# Patient Record
Sex: FEMALE | Race: WHITE | NOT HISPANIC OR LATINO | Employment: FULL TIME | URBAN - METROPOLITAN AREA
[De-identification: names, ages, dates, MRNs, and addresses within clinical notes are randomized per-mention and may not be internally consistent; named-entity substitution may affect disease eponyms.]

---

## 2017-01-10 ENCOUNTER — TELEPHONE (OUTPATIENT)
Dept: UROLOGY | Facility: CLINIC | Age: 62
End: 2017-01-10

## 2017-01-10 ENCOUNTER — OFFICE VISIT (OUTPATIENT)
Dept: UROLOGY | Facility: CLINIC | Age: 62
End: 2017-01-10

## 2017-01-10 VITALS
DIASTOLIC BLOOD PRESSURE: 71 MMHG | WEIGHT: 151.3 LBS | BODY MASS INDEX: 25.83 KG/M2 | SYSTOLIC BLOOD PRESSURE: 104 MMHG | HEIGHT: 64 IN | HEART RATE: 83 BPM

## 2017-01-10 DIAGNOSIS — N89.8 VAGINAL DRYNESS: Primary | ICD-10-CM

## 2017-01-10 DIAGNOSIS — R39.15 URINARY URGENCY: ICD-10-CM

## 2017-01-10 DIAGNOSIS — N39.0 RECURRENT UTI: ICD-10-CM

## 2017-01-10 DIAGNOSIS — N39.41 URGE INCONTINENCE OF URINE: ICD-10-CM

## 2017-01-10 LAB
ALBUMIN UR-MCNC: NEGATIVE MG/DL
APPEARANCE UR: ABNORMAL
BACTERIA #/AREA URNS HPF: ABNORMAL /HPF
BILIRUB UR QL STRIP: NEGATIVE
COLOR UR AUTO: YELLOW
GLUCOSE UR STRIP-MCNC: NEGATIVE MG/DL
HGB UR QL STRIP: ABNORMAL
KETONES UR STRIP-MCNC: NEGATIVE MG/DL
LEUKOCYTE ESTERASE UR QL STRIP: ABNORMAL
MUCOUS THREADS #/AREA URNS LPF: PRESENT /LPF
NITRATE UR QL: NEGATIVE
PH UR STRIP: 6 PH (ref 5–7)
RBC #/AREA URNS AUTO: 12 /HPF (ref 0–2)
SP GR UR STRIP: 1.01 (ref 1–1.03)
SQUAMOUS #/AREA URNS AUTO: 2 /HPF (ref 0–1)
TRANS CELLS #/AREA URNS HPF: 3 /HPF
URN SPEC COLLECT METH UR: ABNORMAL
UROBILINOGEN UR STRIP-MCNC: 0 MG/DL (ref 0–2)
WBC #/AREA URNS AUTO: >182 /HPF (ref 0–2)

## 2017-01-10 RX ORDER — OXYBUTYNIN CHLORIDE 5 MG/1
5 TABLET ORAL 3 TIMES DAILY
Qty: 100 TABLET | Refills: 3 | Status: SHIPPED | OUTPATIENT
Start: 2017-01-10 | End: 2017-02-09

## 2017-01-10 ASSESSMENT — PAIN SCALES - GENERAL: PAINLEVEL: NO PAIN (0)

## 2017-01-10 NOTE — TELEPHONE ENCOUNTER
Patient called regarding UTI symptoms  Frequency urgent dysuria   Ordered uauc and call with results. Kassy Monson LPN Staff Nurse

## 2017-01-10 NOTE — Clinical Note
"1/10/2017       RE: Slim Mcbride  [address] [facility] [address]     Dear Colleague,    Thank you for referring your patient, Slim Mcbride, to the [facility] UROLOGY AND INST FOR PROSTATE AND UROLOGIC CANCERS at [facility]. Please see a copy of my visit note below.    Follow up visit:  1/10/2017  Last seen by me:   9/20/2016    CC: urinary frequency/urgency, recurrent UTI    HPI:  61 year old female with ongoing UTIs-continues to have urgency/frequency with infection. When she does not have infection-urgency much improved q 3-4 hours.  She has completed physical therapy-thought helpful for both urgency/frequency. When she feels has UTI has intermittent urge incontinence. She uses 3-4 PPD. She continues to take 18 mg of oxybutynin/day.  She has noted more vaginal dryness lately and now has dyspareunia-she feels related to dryness.     She notes vaginal bulge.    ROS: no other changes in health since last visit.      PEx:  /71 mmHg  Pulse 83  Ht 1.626 m (5' 4\")  Wt 68.629 kg (151 lb 4.8 oz)  BMI 25.96 kg/m2     Gen appearance:  Well groomed  HEENT:  EOMI, AT NC  Psych:  alert , comfortable in no acute distress  Neuro:  A/O X 3  Skin:  Warm to touch  Resp:  No increased respiratory effort  lymph:  No LE edema  Musk:  Full ROM in extremities  :  :  Normal external genitalia and introitus, mild atrophic changes/vaginal irriation          Urethra hypermobile          Stress incontinence was not demonstrated with bearing down with and without pessary in place.           Grade 2  rectocele            ASSESSMENT and PLAN:  This is a 60 year old female with urinary urgency/frequency with urge incontinence-improved with physical therapy but worse again since she feels she has UTI. She discontinued pessary use as not needed with improvement with PT, tried again recently but painful due to vaginal irritation.    Plan:   Will order:  Urinalysis  Urine " Culture    Continue with oxybutynin 18 mg per day.  Prescribed estrace cream: use pea size amount-place in vagina nightly x 2 weeks then use 2x/ week ongoing-for vaginal dryness.    Follow up with Dr. Dimas in 3-4 weeks to consider prophylactic antibiotic and use of larger pessary #3 (did not give today due to vaginal irritation)      Thank you for allowing me to participate in Ms. Mcbride's care.      30 minutes were spent with the patient today, > 50% in counseling and coordination of care.    Kayla Amador PA-C, PT

## 2017-01-10 NOTE — MR AVS SNAPSHOT
After Visit Summary   1/10/2017    Slim Mcbride    MRN: [id number]           Patient Information     Date Of Birth          1955        Visit Information        Provider Department      1/10/2017 1:30 PM Kayla Amador PA-C [facility]rology and [facility]        Today's Diagnoses     Vaginal dryness    -  1     Recurrent UTI         Urinary urgency         Urge incontinence of urine           Care Instructions    Will order:  Urinalysis  Urine Culture    Continue with oxybutynin 18 mg per day.  Prescribed estrace cream: use pea size amount-place in vagina nightly x 2 weeks then use 2x/ week ongoing-for vaginal dryness.    Follow up with Dr. Dimas in 3-4 weeks to consider prophylactic antibiotic and use of larger pessary #3 (did not give today due to vaginal irritation)    Thank you  Kayla Amador PA-C, PT          Follow-ups after your visit        Your next 10 appointments already scheduled     Feb 23, 2017  8:30 AM   (Arrive by 8:15 AM)   Return Visit with Milagro Dimas MD   [facility] ([facility])    [address] [place] [address] [phone]              Who to contact     Please call your clinic at [phone] to:    Ask questions about your health    Make or cancel appointments    Discuss your medicines    Learn about your test results    Speak to your doctor   If you have compliments or concerns about an experience at your clinic, or if you wish to file a complaint, please contact [facility] Patient Relations at [phone] or email us at [email or web address]         Additional Information About Your Visit        MyChart Information     [email or web address] gives you secure access to your electronic health record. If you see a primary care provider, you can also send messages to your care team and make appointments.  "If you have questions, please call your primary care clinic.  If you do not have a primary care provider, please call [phone] and they will assist you.      [employer] is an electronic gateway that provides easy, online access to your medical records. With [employer], you can request a clinic appointment, read your test results, renew a prescription or communicate with your care team.     To access your existing account, please contact your [facility] or call [phone] for assistance.        Care EveryWhere ID     This is your Care EveryWhere ID. This could be used by other organizations to access your [place] medical records  [id number]        Your Vitals Were     Pulse Height BMI (Body Mass Index)             83 1.626 m (5' 4\") 25.96 kg/m2          Blood Pressure from Last 3 Encounters:   01/10/17 104/71   09/20/16 101/64   05/31/16 103/64    Weight from Last 3 Encounters:   01/10/17 68.629 kg (151 lb 4.8 oz)   09/20/16 69.854 kg (154 lb)   05/31/16 70.897 kg (156 lb 4.8 oz)              We Performed the Following     Routine UA with microscopic - No culture     Urine Culture Aerobic Bacterial          Today's Medication Changes          These changes are accurate as of: 1/10/17  2:36 PM.  If you have any questions, ask your nurse or doctor.               Start taking these medicines.        Dose/Directions    conjugated estrogens cream   Commonly known as:  PREMARIN   Used for:  Vaginal dryness   Started by:  Kayla Amador PA-C        Dose:  0.5 g   Start taking on:  1/12/2017   Place 0.5 g vaginally twice a week Begin with pea-size amount placed in vagina, nightly x 2 weeks. Then use 2x/week   Quantity:  30 g   Refills:  1            Where to get your medicines      These medications were sent to [facility] - [place] - [address]  [address] [facility] [address]     Phone:  [phone]    - conjugated estrogens cream  - oxybutynin " 5 MG tablet             Primary Care Provider Office Phone # Fax #    Joe Cardenas [phone]       [address] [address]        Thank you!     Thank you for choosing [facility] UROLOGY AND [facility] FOR PROSTATE AND UROLOGIC CANCERS  for your care. Our goal is always to provide you with excellent care. Hearing back from our patients is one way we can continue to improve our services. Please take a few minutes to complete the written survey that you may receive in the mail after your visit with us. Thank you!             Your Updated Medication List - Protect others around you: Learn how to safely use, store and throw away your medicines at www.disposemymeds.org.          This list is accurate as of: 1/10/17  2:36 PM.  Always use your most recent med list.                   Brand Name Dispense Instructions for use    ALLEGRA 180 MG tablet   Generic drug:  fexofenadine      take 180 mg by mouth daily.       aspirin 81 MG tablet      take 1 Tab by mouth.       CENTRUM SILVER Chew      take  by mouth. 2 daily       ciprofloxacin 500 MG tablet    CIPRO    14 tablet    Take 1 tablet (500 mg) by mouth 2 times daily       CITRACAL + D 315-200 MG-UNIT Tabs per tablet   Generic drug:  calcium citrate-vitamin D      take  by mouth. 1 in pm, 2 in am       conjugated estrogens cream   Start taking on:  1/12/2017    PREMARIN    30 g    Place 0.5 g vaginally twice a week Begin with pea-size amount placed in vagina, nightly x 2 weeks. Then use 2x/week       cyanocobalamin 1000 MCG tablet    vitamin  B-12     take 1,000 mcg by mouth daily.       nitrofurantoin (macrocrystal-monohydrate) 100 MG capsule    MACROBID    14 capsule    Take 1 capsule (100 mg) by mouth 2 times daily       OMEPRAZOLE MAGNESIUM PO      Take 20.6 mg by mouth       * OXYBUTYNIN CHLORIDE PO      Take 18 mg by mouth       * oxybutynin 5 MG tablet    DITROPAN    100 tablet    Take 1 tablet (5 mg) by mouth 3  times daily       PROZAC 20 MG capsule   Generic drug:  FLUoxetine      Take 60 mg by mouth daily       sulfamethoxazole-trimethoprim 800-160 MG per tablet    BACTRIM DS/SEPTRA DS    10 tablet    Take 1 tablet by mouth 2 times daily       TYLENOL PM EXTRA STRENGTH  MG tablet   Generic drug:  diphenhydrAMINE-acetaminophen      take 2 Tabs by mouth nightly as needed.       * Notice:  This list has 2 medication(s) that are the same as other medications prescribed for you. Read the directions carefully, and ask your doctor or other care provider to review them with you.

## 2017-01-10 NOTE — PROGRESS NOTES
"Follow up visit:  1/10/2017  Last seen by me:   9/20/2016    CC: urinary frequency/urgency, recurrent UTI    HPI:  61 year old female with ongoing UTIs-continues to have urgency/frequency with infection. When she does not have infection-urgency much improved q 3-4 hours.  She has completed physical therapy-thought helpful for both urgency/frequency. When she feels has UTI has intermittent urge incontinence. She uses 3-4 PPD. She continues to take 18 mg of oxybutynin/day.  She has noted more vaginal dryness lately and now has dyspareunia-she feels related to dryness.     She notes vaginal bulge.    ROS: no other changes in health since last visit.      PEx:  /71 mmHg  Pulse 83  Ht 1.626 m (5' 4\")  Wt 68.629 kg (151 lb 4.8 oz)  BMI 25.96 kg/m2     Gen appearance:  Well groomed  HEENT:  EOMI, AT NC  Psych:  alert , comfortable in no acute distress  Neuro:  A/O X 3  Skin:  Warm to touch  Resp:  No increased respiratory effort  lymph:  No LE edema  Musk:  Full ROM in extremities  :  :  Normal external genitalia and introitus, mild atrophic changes/vaginal irriation          Urethra hypermobile          Stress incontinence was not demonstrated with bearing down with and without pessary in place.           Grade 2  rectocele            ASSESSMENT and PLAN:  This is a 60 year old female with urinary urgency/frequency with urge incontinence-improved with physical therapy but worse again since she feels she has UTI. She discontinued pessary use as not needed with improvement with PT, tried again recently but painful due to vaginal irritation.    Plan:   Will order:  Urinalysis  Urine Culture    Continue with oxybutynin 18 mg per day.  Prescribed estrace cream: use pea size amount-place in vagina nightly x 2 weeks then use 2x/ week ongoing-for vaginal dryness.    Follow up with Dr. Dimas in 3-4 weeks to consider prophylactic antibiotic and use of larger pessary #3 (did not give today due to vaginal " irritation)      Thank you for allowing me to participate in Ms. Mcbride's care.      30 minutes were spent with the patient today, > 50% in counseling and coordination of care.    Kayla Amador PA-C, PT

## 2017-01-10 NOTE — PATIENT INSTRUCTIONS
Will order:  Urinalysis  Urine Culture    Continue with oxybutynin 18 mg per day.  Prescribed estrace cream: use pea size amount-place in vagina nightly x 2 weeks then use 2x/ week ongoing-for vaginal dryness.    Follow up with Dr. Dimas in 3-4 weeks to consider prophylactic antibiotic and use of larger pessary #3 (did not give today due to vaginal irritation)    Thank you  Kayla Amador PA-C, PT

## 2017-01-11 DIAGNOSIS — N39.0 URINARY TRACT INFECTION: Primary | ICD-10-CM

## 2017-01-11 LAB
BACTERIA SPEC CULT: ABNORMAL
Lab: ABNORMAL
MICRO REPORT STATUS: ABNORMAL
MICROORGANISM SPEC CULT: ABNORMAL
SPECIMEN SOURCE: ABNORMAL

## 2017-01-11 RX ORDER — CIPROFLOXACIN 500 MG/1
500 TABLET, FILM COATED ORAL 2 TIMES DAILY
Qty: 10 TABLET | Refills: 0 | Status: SHIPPED | OUTPATIENT
Start: 2017-01-11 | End: 2017-02-23

## 2017-02-09 DIAGNOSIS — R39.15 URINARY URGENCY: Primary | ICD-10-CM

## 2017-02-09 DIAGNOSIS — N39.41 URGE INCONTINENCE OF URINE: ICD-10-CM

## 2017-02-09 RX ORDER — OXYBUTYNIN CHLORIDE 5 MG/1
5 TABLET ORAL 3 TIMES DAILY
Qty: 300 TABLET | Refills: 3 | Status: SHIPPED | OUTPATIENT
Start: 2017-02-09 | End: 2017-07-20

## 2017-02-21 ENCOUNTER — PRE VISIT (OUTPATIENT)
Dept: UROLOGY | Facility: CLINIC | Age: 62
End: 2017-02-21

## 2017-02-21 NOTE — TELEPHONE ENCOUNTER
Patient with urinary frequency, urgency and UTI's coming in for follow up. Chart reviewed and all records are available. No need for a call.

## 2017-02-23 ENCOUNTER — OFFICE VISIT (OUTPATIENT)
Dept: UROLOGY | Facility: CLINIC | Age: 62
End: 2017-02-23

## 2017-02-23 VITALS
BODY MASS INDEX: 26.69 KG/M2 | SYSTOLIC BLOOD PRESSURE: 104 MMHG | HEART RATE: 69 BPM | DIASTOLIC BLOOD PRESSURE: 71 MMHG | HEIGHT: 64 IN | WEIGHT: 156.3 LBS

## 2017-02-23 DIAGNOSIS — M99.05 SOMATIC DYSFUNCTION OF PELVIS REGION: ICD-10-CM

## 2017-02-23 DIAGNOSIS — N39.0 RECURRENT UTI: Primary | ICD-10-CM

## 2017-02-23 DIAGNOSIS — N39.46 MIXED INCONTINENCE URGE AND STRESS (MALE)(FEMALE): ICD-10-CM

## 2017-02-23 RX ORDER — FEXOFENADINE HCL 180 MG/1
180 TABLET ORAL DAILY
COMMUNITY

## 2017-02-23 RX ORDER — NORTRIPTYLINE HCL 10 MG
20 CAPSULE ORAL
COMMUNITY
Start: 2017-02-20 | End: 2018-09-13

## 2017-02-23 RX ORDER — OMEPRAZOLE 20 MG/1
20 TABLET, DELAYED RELEASE ORAL
COMMUNITY
End: 2018-02-28

## 2017-02-23 RX ORDER — TROSPIUM CHLORIDE 20 MG/1
20 TABLET, FILM COATED ORAL
COMMUNITY
End: 2017-04-27

## 2017-02-23 RX ORDER — DIPHENHYDRAMINE HCL 50 MG
50 CAPSULE ORAL
COMMUNITY
End: 2023-02-28

## 2017-02-23 RX ORDER — OXYCODONE AND ACETAMINOPHEN 5; 325 MG/1; MG/1
1-2 TABLET ORAL
COMMUNITY
Start: 2015-09-23 | End: 2017-04-27

## 2017-02-23 RX ORDER — HYDROCODONE BITARTRATE AND ACETAMINOPHEN 5; 325 MG/1; MG/1
TABLET ORAL
COMMUNITY
Start: 2016-12-14 | End: 2017-04-27

## 2017-02-23 ASSESSMENT — PAIN SCALES - GENERAL: PAINLEVEL: NO PAIN (0)

## 2017-02-23 NOTE — NURSING NOTE
Chief Complaint   Patient presents with     RECHECK     urinary frequency, urgency and UTI's       Marcia Love MA

## 2017-02-23 NOTE — PROGRESS NOTES
"February 23, 2017    Return visit    Patient returns today for follow up. Has continued to have urinary tract infections.  Has not had upper tract evaluation or cystoscopy. She denies any changes in her health since last visit.    /71  Pulse 69  Ht 1.626 m (5' 4\")  Wt 70.9 kg (156 lb 4.8 oz)  BMI 26.83 kg/m2  She is comfortable, in no distress, non-labored breathing.  Exam deferred to the time of cystoscopy    PVR 0mL by bladder scan    A/P: 61 year old F with Indra, MAYI, pelvic floor dysfunction    At this time given the continued infections have asked her to complete a further evaluation    BMP, CT urogram, CATHETERIZED urine one week prior to a cystoscopy appointment    RTC for cystoscopy and pelvic exam    10 minutes were spent with the patient today, > 50% in counseling and coordination of care    Milagro Dimas MD MPH   of Urology    CC  Patient Care Team:  Joe Cardenas MD as PCP - General (Family Practice)  Kelsey Kenyon NP as MD (Nurse Practitioner)  Kayla Amador PA-C as Physician Assistant (Physician Assistant)  KAYLA AMADOR              "

## 2017-02-23 NOTE — LETTER
"2/23/2017       RE: Slim Mcbride  [address] [facility] [address]     Dear Colleague,    Thank you for referring your patient, Slim Mcbride, to the [facility] UROLOGY AND INST FOR PROSTATE AND UROLOGIC CANCERS at [facility]. Please see a copy of my visit note below.    February 23, 2017    Return visit    Patient returns today for follow up. Has continued to have urinary tract infections.  Has not had upper tract evaluation or cystoscopy. She denies any changes in her health since last visit.    /71  Pulse 69  Ht 1.626 m (5' 4\")  Wt 70.9 kg (156 lb 4.8 oz)  BMI 26.83 kg/m2  She is comfortable, in no distress, non-labored breathing.  Exam deferred to the time of cystoscopy    PVR 0mL by bladder scan    A/P: 61 year old F with Indra, MAYI, pelvic floor dysfunction    At this time given the continued infections have asked her to complete a further evaluation    BMP, CT urogram, CATHETERIZED urine one week prior to a cystoscopy appointment    RTC for cystoscopy and pelvic exam    10 minutes were spent with the patient today, > 50% in counseling and coordination of care    Milagro Dimas MD MPH   of Urology    CC  Patient Care Team:  Joe Cardenas MD as PCP - General (Family Practice)  Kelsey Kenyon NP as MD (Nurse Practitioner)  Kayla Pruitt PA-C as Physician Assistant (Physician Assistant)  KAYLA PRUITT          "

## 2017-02-23 NOTE — PATIENT INSTRUCTIONS
Please do the blood work and CT scan    Please come back one week before your cystoscopy appointment for a nurse visit for a CATHETERIZED urine    If you think you have a urine infection please go to a [facility] so we can track the results    Please return to see us for a cystoscopy (look in the bladder) and a pelvic exam

## 2017-02-23 NOTE — MR AVS SNAPSHOT
After Visit Summary   2/23/2017    Slim Mcbride    MRN: [id number]           Patient Information     Date Of Birth          1955        Visit Information        Provider Department      2/23/2017 8:30 AM Milagro Dimas MD [facility] Urology and [facility] for Prostate and Urologic Cancers        Today's Diagnoses     Recurrent UTI    -  1    Mixed incontinence urge and stress (male)(female)        Somatic dysfunction of pelvis region          Care Instructions    Please do the blood work and CT scan    Please come back one week before your cystoscopy appointment for a nurse visit for a CATHETERIZED urine    If you think you have a urine infection please go to a [place] lab so we can track the results    Please return to see us for a cystoscopy (look in the bladder) and a pelvic exam        Follow-ups after your visit        Your next 10 appointments already scheduled     Mar 17, 2017  8:20 AM CDT   (Arrive by 8:05 AM)   CT ABDOMEN PELVIS W/O & W CONTRAST with UCCT1   [facility] CT ([facility])    [address] [address] [phone]           Please bring any scans or X-rays taken at other hospitals, if similar tests were done. Also bring a list of your medicines, including vitamins, minerals and over-the-counter drugs. It is safest to leave personal items at home.  Be sure to tell your doctor:   If you have any allergies.   If there s any chance you are pregnant.   If you are breastfeeding.   If you have any special needs.  You may have contrast for this exam. To prepare:   Do not eat or drink for 2 hours before your exam. If you need to take medicine, you may take it with small sips of water. (We may ask you to take liquid medicine as well.)   The day before your exam, drink extra fluids at least six 8-ounce glasses (unless your doctor tells you to restrict your fluids).  Patients over 70 or patients with diabetes or  kidney problems:   If you haven t had a blood test (creatinine test) within the last 30 days, go to your clinic or Diagnostic Imaging Department for this test.  If you have diabetes:   If your kidney function is normal, continue taking your metformin (Avandamet, Glucophage, Glucovance, Metaglip) on the day of your exam.   If your kidney function is abnormal, wait 48 hours before restarting this medicine.  You will have oral contrast for this exam:   You will drink the contrast at home. Get this from your clinic or Diagnostic Imaging Department. Please follow the directions given.  Please wear loose clothing, such as a sweat suit or jogging clothes. Avoid snaps, zippers and other metal. We may ask you to undress and put on a hospital gown.  If you have any questions, please call the Imaging Department where you will have your exam.            Mar 17, 2017  8:45 AM CDT   LAB with  LAB   [facility] Lab ([facility])    [address] [place] [address] [phone]           Patient must bring picture ID.  Patient should be prepared to give a urine specimen  Please do not eat 10-12 hours before your appointment if you are coming in fasting for labs on lipids, cholesterol, or glucose (sugar).  Pregnant women should follow their Care Team instructions. Water with medications is okay. Do not drink coffee or other fluids.   If you have concerns about taking  your medications, please ask at office or if scheduling via [employer], send a message by clicking on Secure Messaging, Message Your Care Team.            Mar 17, 2017  9:00 AM CDT   (Arrive by 8:45 AM)   Return Visit with  Prostate Cancer Ctr Nurse   [facility] Urology and Inst for Prostate and Urologic Cancers ([facility])    [address] [place] [id number] [phone]            Mar 23, 2017  8:00 AM CDT   (Arrive by 7:45 AM)   Cystoscopy with Milagro Dimas  MD   [facility] Urology and [facility] for Prostate and Urologic Cancers ([facility])    [address] [facility]  [address] [facility] [address] [phone]              Future tests that were ordered for you today     Open Future Orders        Priority Expected Expires Ordered    Urine Culture Aerobic Bacterial Routine  2/23/2018 2/23/2017    Basic metabolic panel Routine  2/23/2018 2/23/2017    CT Abdomen Pelvis w/o & w Contrast Routine  2/23/2018 2/23/2017            Who to contact     Please call your clinic at [phone] to:    Ask questions about your health    Make or cancel appointments    Discuss your medicines    Learn about your test results    Speak to your doctor   If you have compliments or concerns about an experience at your clinic, or if you wish to file a complaint, please contact [facility] Patient Relations at [phone] or email us at [email or web address]         Additional Information About Your Visit        [employer] Interactive[employer]Queue-it Information     Your[employer] gives you secure access to your electronic health record. If you see a primary care provider, you can also send messages to your care team and make appointments. If you have questions, please call your primary care clinic.  If you do not have a primary care provider, please call [phone] and they will assist you.      YourMe[employer] is an electronic gateway that provides easy, online access to your medical records. With Your[employer], you can request a clinic appointment, read your test results, renew a prescription or communicate with your care team.     To access your existing account, please contact your [facility] or call [phone] for assistance.        Care EveryWhere ID     This is your Care EveryWhere ID. This could be used by other organizations to access your [place] medical records  [id number]        Your Vitals Were     Pulse Height BMI (Body Mass Index)  "            69 1.626 m (5' 4\") 26.83 kg/m2          Blood Pressure from Last 3 Encounters:   02/23/17 104/71   01/10/17 104/71   09/20/16 101/64    Weight from Last 3 Encounters:   02/23/17 70.9 kg (156 lb 4.8 oz)   01/10/17 68.6 kg (151 lb 4.8 oz)   09/20/16 69.9 kg (154 lb)                 Today's Medication Changes          These changes are accurate as of: 2/23/17  9:14 AM.  If you have any questions, ask your nurse or doctor.               These medicines have changed or have updated prescriptions.        Dose/Directions    ciprofloxacin 500 MG tablet   Commonly known as:  CIPRO   This may have changed:  Another medication with the same name was removed. Continue taking this medication, and follow the directions you see here.   Used for:  Urinary tract infection        Dose:  500 mg   Take 1 tablet (500 mg) by mouth 2 times daily   Quantity:  14 tablet   Refills:  0         Stop taking these medicines if you haven't already. Please contact your care team if you have questions.     nitrofurantoin (macrocrystal-monohydrate) 100 MG capsule   Commonly known as:  MACROBID   Stopped by:  Milagro Dimas MD           sulfamethoxazole-trimethoprim 800-160 MG per tablet   Commonly known as:  BACTRIM DS/SEPTRA DS   Stopped by:  Milagro Dimas MD           TYLENOL PM EXTRA STRENGTH  MG tablet   Generic drug:  diphenhydrAMINE-acetaminophen   Stopped by:  Milagro Dimas MD                    Primary Care Provider Office Phone # Fax #    Joe Cardenas [phone]       [facility] [address] [place] [address]        Thank you!     Thank you for choosing [facility] UROLOGY AND [facility] FOR PROSTATE AND UROLOGIC CANCERS  for your care. Our goal is always to provide you with excellent care. Hearing back from our patients is one way we can continue to improve our services. Please take a few minutes to complete the written survey that you may receive in the mail after " your visit with us. Thank you!             Your Updated Medication List - Protect others around you: Learn how to safely use, store and throw away your medicines at www.disposemymeds.org.          This list is accurate as of: 2/23/17  9:14 AM.  Always use your most recent med list.                   Brand Name Dispense Instructions for use    * fexofenadine 180 MG tablet    ALLEGRA         * ALLEGRA 180 MG tablet   Generic drug:  fexofenadine      take 180 mg by mouth daily.       * aspirin 81 MG tablet      Take 81 mg by mouth       * aspirin 81 MG tablet      take 1 Tab by mouth.       CENTRUM SILVER Chew      take  by mouth. 2 daily       ciprofloxacin 500 MG tablet    CIPRO    14 tablet    Take 1 tablet (500 mg) by mouth 2 times daily       * CITRACAL + D 315-200 MG-UNIT Tabs per tablet   Generic drug:  calcium citrate-vitamin D      take  by mouth. 1 in pm, 2 in am       * calcium citrate-vitamin D 315-200 MG-UNIT Tabs per tablet    CITRACAL         conjugated estrogens cream    PREMARIN    30 g    Place 0.5 g vaginally twice a week Begin with pea-size amount placed in vagina, nightly x 2 weeks. Then use 2x/week       * cyanocobalamin 1000 MCG tablet    vitamin  B-12     take 1,000 mcg by mouth daily.       * cyanocobalamin 1000 MCG Tabs          diphenhydrAMINE 50 MG capsule    BENADRYL     Take 50 mg by mouth       FLINTSTONES MULTIVITAMIN OR      None Entered       NORCO 5-325 MG per tablet   Generic drug:  HYDROcodone-acetaminophen          nortriptyline 10 MG capsule    PAMELOR     Take 20 mg by mouth       omeprazole 20 MG tablet    priLOSEC OTC     Take 20 mg by mouth       OMEPRAZOLE MAGNESIUM PO      Take 20.6 mg by mouth       * OXYBUTYNIN CHLORIDE PO      Take 18 mg by mouth       * oxybutynin 5 MG tablet    DITROPAN    300 tablet    Take 1 tablet (5 mg) by mouth 3 times daily       oxyCODONE-acetaminophen 5-325 MG per tablet    PERCOCET     Take 1-2 tablets by mouth       * FLUoxetine 20 MG capsule     PROzac     Take 20 mg by mouth       * PROZAC 20 MG capsule   Generic drug:  FLUoxetine      Take 60 mg by mouth daily       trospium 20 MG tablet    SANCTURA     Take 20 mg by mouth       * Notice:  This list has 12 medication(s) that are the same as other medications prescribed for you. Read the directions carefully, and ask your doctor or other care provider to review them with you.

## 2017-03-16 ENCOUNTER — TELEPHONE (OUTPATIENT)
Dept: UROLOGY | Facility: CLINIC | Age: 62
End: 2017-03-16

## 2017-03-16 ENCOUNTER — ALLIED HEALTH/NURSE VISIT (OUTPATIENT)
Dept: UROLOGY | Facility: CLINIC | Age: 62
End: 2017-03-16

## 2017-03-16 VITALS — HEIGHT: 64 IN | BODY MASS INDEX: 26.63 KG/M2 | WEIGHT: 156 LBS

## 2017-03-16 DIAGNOSIS — N84.0 ENDOMETRIAL POLYP: Primary | ICD-10-CM

## 2017-03-16 DIAGNOSIS — N39.46 MIXED INCONTINENCE URGE AND STRESS (MALE)(FEMALE): Primary | ICD-10-CM

## 2017-03-16 DIAGNOSIS — N39.0 RECURRENT UTI: ICD-10-CM

## 2017-03-16 LAB
ALBUMIN UR-MCNC: NEGATIVE MG/DL
ANION GAP SERPL CALCULATED.3IONS-SCNC: 6 MMOL/L (ref 3–14)
APPEARANCE UR: CLEAR
BILIRUB UR QL STRIP: NEGATIVE
BUN SERPL-MCNC: 16 MG/DL (ref 7–30)
CALCIUM SERPL-MCNC: 8.6 MG/DL (ref 8.5–10.1)
CHLORIDE SERPL-SCNC: 104 MMOL/L (ref 94–109)
CO2 SERPL-SCNC: 28 MMOL/L (ref 20–32)
COLOR UR AUTO: YELLOW
CREAT SERPL-MCNC: 0.84 MG/DL (ref 0.52–1.04)
GFR SERPL CREATININE-BSD FRML MDRD: 68 ML/MIN/1.7M2
GLUCOSE SERPL-MCNC: 79 MG/DL (ref 70–99)
GLUCOSE UR STRIP-MCNC: NEGATIVE MG/DL
HGB UR QL STRIP: NEGATIVE
KETONES UR STRIP-MCNC: NEGATIVE MG/DL
LEUKOCYTE ESTERASE UR QL STRIP: NEGATIVE
NITRATE UR QL: NEGATIVE
PH UR STRIP: 7 PH (ref 5–7)
POTASSIUM SERPL-SCNC: 4.8 MMOL/L (ref 3.4–5.3)
RBC #/AREA URNS AUTO: 1 /HPF (ref 0–2)
SODIUM SERPL-SCNC: 139 MMOL/L (ref 133–144)
SP GR UR STRIP: 1.02 (ref 1–1.03)
SQUAMOUS #/AREA URNS AUTO: <1 /HPF (ref 0–1)
URN SPEC COLLECT METH UR: NORMAL
UROBILINOGEN UR STRIP-MCNC: 0 MG/DL (ref 0–2)
WBC #/AREA URNS AUTO: 1 /HPF (ref 0–2)

## 2017-03-16 ASSESSMENT — PAIN SCALES - GENERAL: PAINLEVEL: NO PAIN (0)

## 2017-03-16 NOTE — TELEPHONE ENCOUNTER
Shadi from radiology ascess  [phone] regarding recent ct findings  Endometrial mass  Message sent to dr su. Kassy Monson LPN Staff Nurse

## 2017-03-16 NOTE — PROGRESS NOTES
"Chief Complaint   Patient presents with     Allied Health Visit     Catheterized urine sample prior to cystoscopy appointment       Height 1.626 m (5' 4\"), weight 70.8 kg (156 lb). Body mass index is 26.78 kg/(m^2).    Patient Active Problem List   Diagnosis     CARDIOVASCULAR SCREENING; LDL GOAL LESS THAN 160     Mixed incontinence urge and stress (male)(female)     Somatic dysfunction of pelvis region       Allergies   Allergen Reactions     Nkda [No Known Drug Allergies]      No Clinical Screening - See Comments Rash and Other (See Comments)     Pt received medication for over active bladder which caused itching- doesn;t remember the name of it.       Current Outpatient Prescriptions   Medication Sig Dispense Refill     nortriptyline (PAMELOR) 10 MG capsule Take 20 mg by mouth       Pediatric Multiple Vitamins (FLINTSTONES MULTIVITAMIN OR) None Entered       HYDROcodone-acetaminophen (NORCO) 5-325 MG per tablet        FLUoxetine (PROZAC) 20 MG capsule Take 20 mg by mouth       fexofenadine (ALLEGRA) 180 MG tablet        calcium citrate-vitamin D (CITRACAL) 315-200 MG-UNIT TABS per tablet        trospium (SANCTURA) 20 MG tablet Take 20 mg by mouth       oxyCODONE-acetaminophen (PERCOCET) 5-325 MG per tablet Take 1-2 tablets by mouth       omeprazole (PRILOSEC OTC) 20 MG tablet Take 20 mg by mouth       diphenhydrAMINE (BENADRYL) 50 MG capsule Take 50 mg by mouth       cyanocobalamin 1000 MCG TABS        aspirin 81 MG tablet Take 81 mg by mouth       oxybutynin (DITROPAN) 5 MG tablet Take 1 tablet (5 mg) by mouth 3 times daily 300 tablet 3     conjugated estrogens (PREMARIN) cream Place 0.5 g vaginally twice a week Begin with pea-size amount placed in vagina, nightly x 2 weeks. Then use 2x/week 30 g 1     ciprofloxacin (CIPRO) 500 MG tablet Take 1 tablet (500 mg) by mouth 2 times daily 14 tablet 0     OMEPRAZOLE MAGNESIUM PO Take 20.6 mg by mouth       OXYBUTYNIN CHLORIDE PO Take 18 mg by mouth       FLUoxetine " (PROZAC) 20 MG capsule Take 60 mg by mouth daily        fexofenadine (ALLEGRA) 180 MG tablet take 180 mg by mouth daily.       calcium citrate-vitamin D (CITRACAL + D) 315-200 MG-UNIT TABS take  by mouth. 1 in pm, 2 in am       cyanocolbalamin (VITAMIN B-12) 1000 MCG tablet take 1,000 mcg by mouth daily.       Multiple Vitamins-Minerals (CENTRUM SILVER) CHEW take  by mouth. 2 daily       aspirin 81 MG TABS take 1 Tab by mouth.         Social History   Substance Use Topics     Smoking status: Former Smoker     Smokeless tobacco: Not on file      Comment: 1978     Alcohol use No     Slim Mcbride comes into clinic today at the request of Dr. Milagro Dimas for catheterized urine sample.    Patient prepped in the normal sterile fashion and catheterized to obtain urine sample. Urine sample sent down to lab for UA UC via tubing system at 0945.    This service provided today was under the direct supervision of Dr. Dimas, who was available if needed.    Kandice Edmonds LPN  3/16/2017  9:47 AM

## 2017-03-16 NOTE — MR AVS SNAPSHOT
After Visit Summary   3/16/2017    Slim Mcbride    MRN: [id number]           Patient Information     Date Of Birth          1955        Visit Information        Provider Department      3/16/2017 9:30 AM Nurse, Juan Prostate Cancer [facility] Urology and Inst for Prostate and Urologic Cancers        Today's Diagnoses     Mixed incontinence urge and stress (male)(female)    -  1       Follow-ups after your visit        Your next 10 appointments already scheduled     Mar 16, 2017 10:00 AM CDT   LAB with  LAB   [facility] Lab ([facility])    [address] [place] [address] [phone]           Patient must bring picture ID.  Patient should be prepared to give a urine specimen  Please do not eat 10-12 hours before your appointment if you are coming in fasting for labs on lipids, cholesterol, or glucose (sugar).  Pregnant women should follow their Care Team instructions. Water with medications is okay. Do not drink coffee or other fluids.   If you have concerns about taking  your medications, please ask at office or if scheduling via [employer], send a message by clicking on Secure Messaging, Message Your Care Team.            Mar 23, 2017  8:00 AM CDT   (Arrive by 7:45 AM)   Cystoscopy with Milagro Dimas MD   [facility] Urology and Inst for Prostate and Urologic Cancers ([facility])    [address] [place] [address] [phone]              Who to contact     Please call your clinic at [phone] to:    Ask questions about your health    Make or cancel appointments    Discuss your medicines    Learn about your test results    Speak to your doctor   If you have compliments or concerns about an experience at your clinic, or if you wish to file a complaint, please contact [facility] Patient Relations at [phone] or email us at  [email or web address]         Additional Information About Your Visit        Simply [employer] Information     [employer] Mobile Solutionst gives you secure access to your electronic health record. If you see a primary care provider, you can also send messages to your care team and make appointments. If you have questions, please call your primary care clinic.  If you do not have a primary care provider, please call [phone] and they will assist you.      [employer] is an electronic gateway that provides easy, online access to your medical records. With [employer], you can request a clinic appointment, read your test results, renew a prescription or communicate with your care team.     To access your existing account, please contact your [facility] or call [phone] for assistance.        Care EveryWhere ID     This is your Care EveryWhere ID. This could be used by other organizations to access your [place] medical records  [id number]        Your Vitals Were     Height BMI (Body Mass Index)                1.626 m (5' 4\") 26.78 kg/m2           Blood Pressure from Last 3 Encounters:   02/23/17 104/71   01/10/17 104/71   09/20/16 101/64    Weight from Last 3 Encounters:   03/16/17 70.8 kg (156 lb)   02/23/17 70.9 kg (156 lb 4.8 oz)   01/10/17 68.6 kg (151 lb 4.8 oz)              We Performed the Following     Cath Insertion, Non-indwelling ([address])     Routine UA with microscopic - No culture     Urine Culture Aerobic Bacterial        Primary Care Provider Office Phone # Fax #    Joe Cardenas [phone]       [facility] [address] [facility] [place] [address]        Thank you!     Thank you for choosing [facility] UROLOGY AND [facility] FOR PROSTATE AND UROLOGIC CANCERS  for your care. Our goal is always to provide you with excellent care. Hearing back from our patients is one way we can continue to improve our services. Please take a few minutes to complete the written survey " that you may receive in the mail after your visit with us. Thank you!             Your Updated Medication List - Protect others around you: Learn how to safely use, store and throw away your medicines at www.disposemymeds.org.          This list is accurate as of: 3/16/17  9:50 AM.  Always use your most recent med list.                   Brand Name Dispense Instructions for use    * fexofenadine 180 MG tablet    ALLEGRA         * ALLEGRA 180 MG tablet   Generic drug:  fexofenadine      take 180 mg by mouth daily.       * aspirin 81 MG tablet      Take 81 mg by mouth       * aspirin 81 MG tablet      take 1 Tab by mouth.       CENTRUM SILVER Chew      take  by mouth. 2 daily       ciprofloxacin 500 MG tablet    CIPRO    14 tablet    Take 1 tablet (500 mg) by mouth 2 times daily       * CITRACAL + D 315-200 MG-UNIT Tabs per tablet   Generic drug:  calcium citrate-vitamin D      take  by mouth. 1 in pm, 2 in am       * calcium citrate-vitamin D 315-200 MG-UNIT Tabs per tablet    CITRACAL         conjugated estrogens cream    PREMARIN    30 g    Place 0.5 g vaginally twice a week Begin with pea-size amount placed in vagina, nightly x 2 weeks. Then use 2x/week       * cyanocobalamin 1000 MCG tablet    vitamin  B-12     take 1,000 mcg by mouth daily.       * cyanocobalamin 1000 MCG Tabs          diphenhydrAMINE 50 MG capsule    BENADRYL     Take 50 mg by mouth       FLINTSTONES MULTIVITAMIN OR      None Entered       NORCO 5-325 MG per tablet   Generic drug:  HYDROcodone-acetaminophen          nortriptyline 10 MG capsule    PAMELOR     Take 20 mg by mouth       omeprazole 20 MG tablet    priLOSEC OTC     Take 20 mg by mouth       OMEPRAZOLE MAGNESIUM PO      Take 20.6 mg by mouth       * OXYBUTYNIN CHLORIDE PO      Take 18 mg by mouth       * oxybutynin 5 MG tablet    DITROPAN    300 tablet    Take 1 tablet (5 mg) by mouth 3 times daily       oxyCODONE-acetaminophen 5-325 MG per tablet    PERCOCET     Take 1-2 tablets by  mouth       * FLUoxetine 20 MG capsule    PROzac     Take 20 mg by mouth       * PROZAC 20 MG capsule   Generic drug:  FLUoxetine      Take 60 mg by mouth daily       trospium 20 MG tablet    SANCTURA     Take 20 mg by mouth       * Notice:  This list has 12 medication(s) that are the same as other medications prescribed for you. Read the directions carefully, and ask your doctor or other care provider to review them with you.

## 2017-03-17 LAB
BACTERIA SPEC CULT: NO GROWTH
BACTERIA SPEC CULT: NORMAL
Lab: NORMAL
Lab: NORMAL
MICRO REPORT STATUS: NORMAL
MICRO REPORT STATUS: NORMAL
SPECIMEN SOURCE: NORMAL
SPECIMEN SOURCE: NORMAL

## 2017-03-20 ENCOUNTER — HOSPITAL ENCOUNTER (OUTPATIENT)
Dept: MRI IMAGING | Facility: CLINIC | Age: 62
Discharge: HOME | End: 2017-03-20
Attending: UROLOGY | Admitting: UROLOGY
Payer: COMMERCIAL

## 2017-03-20 DIAGNOSIS — N84.0 ENDOMETRIAL POLYP: ICD-10-CM

## 2017-03-20 PROCEDURE — 72197 MRI PELVIS W/O & W/DYE: CPT

## 2017-03-20 PROCEDURE — A9585 GADOBUTROL INJECTION: HCPCS | Performed by: UROLOGY

## 2017-03-20 PROCEDURE — 25500064 ZZH RX 255 OP 636: Performed by: UROLOGY

## 2017-03-20 RX ORDER — GADOBUTROL 604.72 MG/ML
7.5 INJECTION INTRAVENOUS ONCE
Status: COMPLETED | OUTPATIENT
Start: 2017-03-20 | End: 2017-03-20

## 2017-03-20 RX ADMIN — GADOBUTROL 7.5 ML: 604.72 INJECTION INTRAVENOUS at 08:13

## 2017-03-22 ENCOUNTER — PRE VISIT (OUTPATIENT)
Dept: UROLOGY | Facility: CLINIC | Age: 62
End: 2017-03-22

## 2017-03-22 NOTE — TELEPHONE ENCOUNTER
Patient with frequent UTI's coming in for a cystoscopy. Chart reviewed and all records available. Reminder call placed for pt to come with a full bladder.

## 2017-03-23 ENCOUNTER — OFFICE VISIT (OUTPATIENT)
Dept: UROLOGY | Facility: CLINIC | Age: 62
End: 2017-03-23

## 2017-03-23 VITALS
BODY MASS INDEX: 27.49 KG/M2 | WEIGHT: 161 LBS | SYSTOLIC BLOOD PRESSURE: 114 MMHG | HEIGHT: 64 IN | HEART RATE: 62 BPM | DIASTOLIC BLOOD PRESSURE: 71 MMHG

## 2017-03-23 DIAGNOSIS — N39.0 RECURRENT UTI: Primary | ICD-10-CM

## 2017-03-23 DIAGNOSIS — D25.0 FIBROIDS, SUBMUCOSAL: ICD-10-CM

## 2017-03-23 DIAGNOSIS — N39.46 MIXED INCONTINENCE: ICD-10-CM

## 2017-03-23 DIAGNOSIS — N32.81 URGENCY-FREQUENCY SYNDROME: ICD-10-CM

## 2017-03-23 DIAGNOSIS — M62.89 HIGH-TONE PELVIC FLOOR DYSFUNCTION: ICD-10-CM

## 2017-03-23 LAB
APPEARANCE UR: NORMAL
BILIRUB UR QL: NORMAL
COLOR UR: YELLOW
GLUCOSE URINE: NORMAL MG/DL
HGB UR QL: NORMAL
KETONES UR QL: NORMAL MG/DL
LEUKOCYTE ESTERASE URINE: NORMAL
NITRITE UR QL STRIP: NORMAL
PH UR STRIP: 7 PH (ref 5–7)
PROTEIN ALBUMIN URINE: NORMAL MG/DL
SOURCE: NORMAL
SP GR UR STRIP: 1 (ref 1–1.03)
UROBILINOGEN UR QL STRIP: 0.2 EU/DL (ref 0.2–1)

## 2017-03-23 ASSESSMENT — PAIN SCALES - GENERAL
PAINLEVEL: NO PAIN (0)
PAINLEVEL: NO PAIN (0)

## 2017-03-23 NOTE — LETTER
"3/23/2017       RE: Slim Mcbride  [address] [facility] [address]     Dear Colleague,    Thank you for referring your patient, Slim Mcbride, to the [facility] UROLOGY AND INST FOR PROSTATE AND UROLOGIC CANCERS at [facility]. Please see a copy of my visit note below.    March 23, 2017    Return visit    Patient returns today for follow up for Indra. She denies any changes in her health since last visit.    /71  Pulse 62  Ht 1.626 m (5' 4\")  Wt 73 kg (161 lb)  BMI 27.64 kg/m2  She is comfortable, in no distress, non-labored breathing.  Abdomen is soft, non-tender, non-distended.  Normal external female genitalia.  Negative CST.  Pelvic exam unremarkable    Cystoscopy Note: After informed consent was obtained patient was prepped and draped in the standard fashion.  The flexible cystoscope was inserted into a normal appearing urethral meatus.  The urothelium was carefully examined and there were no tumors, masses, stones, foreign bodies, or other urothelial abnormalities noted.  Bilateral ureteral orifices were noted in the normal orthotopic position and both effluxed clear urine.  The cystoscope was retroflexed and the bladder neck was unremarkable.  The urethra was carefully examined upon removing the cystoscope and was unremarkable.  Patient tolerated the procedure without complications noted.      CT scan images were reviewed by me and there was no obvious urologic etiology of her Indra.  There was a concern for endometrial mass on the report    MRI IMPRESSION: Abnormality on CT corresponds to a submucosal fibroid in  the anterior superior uterine body with approximately 50% of its  circumference projecting into the endometrial canal.IMPRESSION: Abnormality on CT corresponds to a submucosal fibroid in  the anterior superior uterine body with approximately 50% of its  circumference projecting into the endometrial canal.    A/P: 61 year old F with mixed urinary " incontinence, Indra, pelvic floor dysfunction and submucosal fibroid    At this time monitor for UTI, she is instructed to contact the clinic if she has concern for infection    Given her urinary symptoms and fact that she is interested in further treatment for her incontinence, will have her undergo urodynamics testing    Although MRI suggests submucosal fibroid, given the lesion and her postmenopausal status also have placed a gyn referral    RTC after UDS to discuss further management of her MAYI    Milagro Dimas MD MPH   of Urology    CC  Patient Care Team:  Joe Cardenas MD as PCP - General (Family Practice)  Kelsey Kenyon NP as MD (Nurse Practitioner)  Kayla Amador PA-C as Physician Assistant (Physician Assistant)  Milagro Dimas MD as MD (Urology)  ESTABLISHED PATIENT

## 2017-03-23 NOTE — PATIENT INSTRUCTIONS
Websites with free information:    [employer] patient website: www.voicesforpfd.org    Total Control Program: www.totalcontrolprogram.com    Please take something daily to prevent constipation (something that you mix in the water)    Please arrange for bladder testing then return to see us to discuss next steps    If you think you have a bladder infection please contact our clinic

## 2017-03-23 NOTE — NURSING NOTE
Invasive Procedure Safety Checklist:    Procedure: cystoscopy     Action: Complete sections and checkboxes as appropriate.    Pre-procedure:  1. Patient ID Verified with 2 identifiers (Kira and  or MRN) : YES    2. Procedure and site verified with patient/designee (when able) : YES    3. Accurate consent documentation in medical record : YES    4. H&P (or appropriate assessment) documented in medical record : NO  H&P must be up to 30 days prior to procedure an updated within 24 hours of                 Procedure as applicable.     5. Relevant diagnostic and radiology test results appropriately labeled and displayed as applicable : YES    6. Blood products, implants, devices, and/or special equipment available for the procedure as applicable : YES    7. Procedure site(s) marked with provider initials [Exclusions: none] : NO    8. Marking not required. Reason : Yes  Procedure does not require site marking    Time Out:     Time-Out performed immediately prior to starting procedure, including verbal and active participation of all team members addressing: YES    1. Correct patient identity.  2. Confirmed that the correct side and site are marked.  3. An accurate procedure to be done.  4. Agreement on the procedure to be done.  5. Correct patient position.  6. Relevant images and results are properly labeled and appropriately displayed.  7. The need to administer antibiotics or fluids for irrigation purposes during the procedure as applicable.  8. Safety precautions based on patient history or medication use.    During Procedure: Verification of correct person, site, and procedure occurs any time the responsibility for care of the patient is transferred to another member of the care team.

## 2017-03-23 NOTE — PROGRESS NOTES
"March 23, 2017    Return visit    Patient returns today for follow up for Indra. She denies any changes in her health since last visit.    /71  Pulse 62  Ht 1.626 m (5' 4\")  Wt 73 kg (161 lb)  BMI 27.64 kg/m2  She is comfortable, in no distress, non-labored breathing.  Abdomen is soft, non-tender, non-distended.  Normal external female genitalia.  Negative CST.  Pelvic exam unremarkable    Cystoscopy Note: After informed consent was obtained patient was prepped and draped in the standard fashion.  The flexible cystoscope was inserted into a normal appearing urethral meatus.  The urothelium was carefully examined and there were no tumors, masses, stones, foreign bodies, or other urothelial abnormalities noted.  Bilateral ureteral orifices were noted in the normal orthotopic position and both effluxed clear urine.  The cystoscope was retroflexed and the bladder neck was unremarkable.  The urethra was carefully examined upon removing the cystoscope and was unremarkable.  Patient tolerated the procedure without complications noted.      CT scan images were reviewed by me and there was no obvious urologic etiology of her Indra.  There was a concern for endometrial mass on the report    MRI IMPRESSION: Abnormality on CT corresponds to a submucosal fibroid in  the anterior superior uterine body with approximately 50% of its  circumference projecting into the endometrial canal.IMPRESSION: Abnormality on CT corresponds to a submucosal fibroid in  the anterior superior uterine body with approximately 50% of its  circumference projecting into the endometrial canal.    A/P: 61 year old F with mixed urinary incontinence, Indra, pelvic floor dysfunction and submucosal fibroid    At this time monitor for UTI, she is instructed to contact the clinic if she has concern for infection    Given her urinary symptoms and fact that she is interested in further treatment for her incontinence, will have her undergo urodynamics " testing    Although MRI suggests submucosal fibroid, given the lesion and her postmenopausal status also have placed a gyn referral    RTC after UDS to discuss further management of her MAYI    Milagro Dimas MD MPH   of Urology    CC  Patient Care Team:  Joe Cardenas MD as PCP - General (Family Practice)  Kelsey Kenyon NP as MD (Nurse Practitioner)  Kayla Amador PA-C as Physician Assistant (Physician Assistant)  Milagro Dimas MD as MD (Urology)  ESTABLISHED PATIENT

## 2017-03-23 NOTE — MR AVS SNAPSHOT
After Visit Summary   3/23/2017    Slim Mcbride    MRN: [id number]           Patient Information     Date Of Birth          1955        Visit Information        Provider Department      3/23/2017 8:00 AM Milagro Dimas MD [facility] Urology and [facility]rolog[facility]        Today's Diagnoses     Recurrent UTI    -  1    Fibroids, submucosal          Care Instructions    Websites with free information:    American Urogynec[employer] Society patient website: www.voicesforpfd.org    Total Control Program: www.totalcontrolprogram.com    Please take something daily to prevent constipation (something that you mix in the water)    Please arrange for bladder testing then return to see us to discuss next steps    If you think you have a bladder infection please contact our clinic        Follow-ups after your visit        Additional Services     OB/GYN REFERRAL       Your provider has referred you to:  PREFERRED PROVIDERS:    Please be aware that coverage of these services is subject to the terms and limitations of your health insurance plan.  Call member services at your health plan with any benefit or coverage questions.      Please bring the following with you to your appointment:    (1) Any X-Rays, CTs or MRIs which have been performed.  Contact the facility where they were done to arrange for  prior to your scheduled appointment.   (2) List of current medications   (3) This referral request   (4) Any documents/labs given to you for this referral                  Your next 10 appointments already scheduled     Apr 12, 2017  7:30 AM CDT   (Arrive by 7:15 AM)   Urodynamics with  Uro Procedure Nurse   [facility] Urology and [facility]rolog[facility] ([facility])    [address] [place] [address]  [facility] [address] [phone]            Apr 27, 2017  8:30 AM CDT   (Arrive by 8:15 AM)   Return Visit with Milagro Dimas MD     [facility] ([facility])    [address] [facility] [facility]  [facility] [address] [phone]              Who to contact     Please call your clinic at [phone] to:    Ask questions about your health    Make or cancel appointments    Discuss your medicines    Learn about your test results    Speak to your doctor   If you have compliments or concerns about an experience at your clinic, or if you wish to file a complaint, please contact [facility] Patient Relations at [phone] or email us at [email or web address]         Additional Information About Your Visit        DoculynxharTrunk Archive Information     AeroDron gives you secure access to your electronic health record. If you see a primary care provider, you can also send messages to your care team and make appointments. If you have questions, please call your primary care clinic.  If you do not have a primary care provider, please call [phone] and they will assist you.      AeroDron is an electronic gateway that provides easy, online access to your medical records. With AeroDron, you can request a clinic appointment, read your test results, renew a prescription or communicate with your care team.     To access your existing account, please contact your [facility] or call [phone] for assistance.        Care EveryWhere ID     This is your Care EveryWhere ID. This could be used by other organizations to access your [place] medical records  [id number]        Your Vitals Were     Pulse Height BMI (Body Mass Index)             62 1.626 m (5' 4\") 27.64 kg/m2          Blood Pressure from Last 3 Encounters:   03/23/17 114/71   02/23/17 104/71   01/10/17 104/71    Weight from Last 3 Encounters:   03/23/17 73 kg (161 lb)   03/16/17 70.8 kg (156 lb)   02/23/17 70.9 kg (156 lb 4.8 oz)              We Performed the Following     " CYSTOURETHROSCOPY ([id number])     Cytology non gyn     OB/GYN REFERRAL        Primary Care Provider Office Phone # Fax #    Joe Cradenas [phone]       [address] [address]        Thank you!     Thank you for choosing [facility] UROLOGY AND [facility] FOR PROSTATE AND UROLOGIC CANCERS  for your care. Our goal is always to provide you with excellent care. Hearing back from our patients is one way we can continue to improve our services. Please take a few minutes to complete the written survey that you may receive in the mail after your visit with us. Thank you!             Your Updated Medication List - Protect others around you: Learn how to safely use, store and throw away your medicines at www.disposemymeds.org.          This list is accurate as of: 3/23/17  9:03 AM.  Always use your most recent med list.                   Brand Name Dispense Instructions for use    * fexofenadine 180 MG tablet    ALLEGRA         * ALLEGRA 180 MG tablet   Generic drug:  fexofenadine      take 180 mg by mouth daily.       * aspirin 81 MG tablet      Take 81 mg by mouth       * aspirin 81 MG tablet      take 1 Tab by mouth.       CENTRUM SILVER Chew      take  by mouth. 2 daily       ciprofloxacin 500 MG tablet    CIPRO    14 tablet    Take 1 tablet (500 mg) by mouth 2 times daily       * CITRACAL + D 315-200 MG-UNIT Tabs per tablet   Generic drug:  calcium citrate-vitamin D      take  by mouth. 1 in pm, 2 in am       * calcium citrate-vitamin D 315-200 MG-UNIT Tabs per tablet    CITRACAL         conjugated estrogens cream    PREMARIN    30 g    Place 0.5 g vaginally twice a week Begin with pea-size amount placed in vagina, nightly x 2 weeks. Then use 2x/week       * cyanocobalamin 1000 MCG tablet    vitamin  B-12     take 1,000 mcg by mouth daily.       * cyanocobalamin 1000 MCG Tabs          diphenhydrAMINE 50 MG capsule    BENADRYL     Take 50 mg by mouth       FLINTSTONES  MULTIVITAMIN OR      None Entered       NORCO 5-325 MG per tablet   Generic drug:  HYDROcodone-acetaminophen          nortriptyline 10 MG capsule    PAMELOR     Take 20 mg by mouth       omeprazole 20 MG tablet    priLOSEC OTC     Take 20 mg by mouth       OMEPRAZOLE MAGNESIUM PO      Take 20.6 mg by mouth       * OXYBUTYNIN CHLORIDE PO      Take 18 mg by mouth       * oxybutynin 5 MG tablet    DITROPAN    300 tablet    Take 1 tablet (5 mg) by mouth 3 times daily       oxyCODONE-acetaminophen 5-325 MG per tablet    PERCOCET     Take 1-2 tablets by mouth       * FLUoxetine 20 MG capsule    PROzac     Take 20 mg by mouth       * PROZAC 20 MG capsule   Generic drug:  FLUoxetine      Take 60 mg by mouth daily       trospium 20 MG tablet    SANCTURA     Take 20 mg by mouth       * Notice:  This list has 12 medication(s) that are the same as other medications prescribed for you. Read the directions carefully, and ask your doctor or other care provider to review them with you.

## 2017-03-24 LAB — COPATH REPORT: NORMAL

## 2017-04-11 ENCOUNTER — PRE VISIT (OUTPATIENT)
Dept: UROLOGY | Facility: CLINIC | Age: 62
End: 2017-04-11

## 2017-04-12 ENCOUNTER — OFFICE VISIT (OUTPATIENT)
Dept: UROLOGY | Facility: CLINIC | Age: 62
End: 2017-04-12

## 2017-04-12 DIAGNOSIS — R30.0 DYSURIA: Primary | ICD-10-CM

## 2017-04-12 LAB
APPEARANCE UR: CLEAR
BILIRUB UR QL: NORMAL
COLOR UR: YELLOW
GLUCOSE URINE: NORMAL MG/DL
HGB UR QL: NORMAL
KETONES UR QL: NORMAL MG/DL
LEUKOCYTE ESTERASE URINE: NORMAL
NITRITE UR QL STRIP: NORMAL
PH UR STRIP: 6.5 PH (ref 5–7)
PROTEIN ALBUMIN URINE: NORMAL MG/DL
SOURCE: NORMAL
SP GR UR STRIP: 1.01 (ref 1–1.03)
UROBILINOGEN UR QL STRIP: 0.2 EU/DL (ref 0.2–1)

## 2017-04-12 ASSESSMENT — PAIN SCALES - GENERAL: PAINLEVEL: NO PAIN (1)

## 2017-04-12 NOTE — MR AVS SNAPSHOT
After Visit Summary   4/12/2017    Slim Mcbride    MRN: [id number]           Patient Information     Date Of Birth          1955        Visit Information        Provider Department      4/12/2017 7:30 AM Nurse, Juan Uro Procedure [facility] Urology and [facility]        Today's Diagnoses     Dysuria    -  1       Follow-ups after your visit        Your next 10 appointments already scheduled     Apr 27, 2017  8:30 AM CDT   (Arrive by 8:15 AM)   Return Visit with Milagro Dimas MD   [facility] and [facility] ([facility])    [address] [facility]  [address] [facility] [address] [phone]              Who to contact     Please call your clinic at [phone] to:    Ask questions about your health    Make or cancel appointments    Discuss your medicines    Learn about your test results    Speak to your doctor   If you have compliments or concerns about an experience at your clinic, or if you wish to file a complaint, please contact [facility] Patient Relations at [phone] or email us at [email or web address]         Additional Information About Your Visit        MyChart Information     Xeroxt gives you secure access to your electronic health record. If you see a primary care provider, you can also send messages to your care team and make appointments. If you have questions, please call your primary care clinic.  If you do not have a primary care provider, please call [phone] and they will assist you.      Bomgar is an electronic gateway that provides easy, online access to your medical records. With Bomgar, you can request a clinic appointment, read your test results, renew a prescription or communicate with your care team.     To access your existing account, please contact your [facility] or call [phone] for  "assistance.        Care EveryWhere ID     This is your Care EveryWhere ID. This could be used by other organizations to access your [place] medical records  [id number]        Your Vitals Were     Pulse Height BMI (Body Mass Index)             69 1.626 m (5' 4\") 27.98 kg/m2          Blood Pressure from Last 3 Encounters:   04/12/17 115/62   03/23/17 114/71   02/23/17 104/71    Weight from Last 3 Encounters:   04/12/17 73.9 kg (163 lb)   03/23/17 73 kg (161 lb)   03/16/17 70.8 kg (156 lb)              We Performed the Following     PROCEDURE OTHER - HIM SCAN     Urinalysis chemical screen POCT        Primary Care Provider Office Phone # Fax #    Joe Cardenas [phone]       [address] [address]        Thank you!     Thank you for choosing [facility] UROLOGY AND [facility] FOR PROSTATE AND UROLOGIC CANCERS  for your care. Our goal is always to provide you with excellent care. Hearing back from our patients is one way we can continue to improve our services. Please take a few minutes to complete the written survey that you may receive in the mail after your visit with us. Thank you!             Your Updated Medication List - Protect others around you: Learn how to safely use, store and throw away your medicines at www.disposemymeds.org.          This list is accurate as of: 4/12/17 11:59 PM.  Always use your most recent med list.                   Brand Name Dispense Instructions for use    * fexofenadine 180 MG tablet    ALLEGRA         * ALLEGRA 180 MG tablet   Generic drug:  fexofenadine      take 180 mg by mouth daily.       * aspirin 81 MG tablet      Take 81 mg by mouth       * aspirin 81 MG tablet      take 1 Tab by mouth.       CENTRUM SILVER Chew      take  by mouth. 2 daily       ciprofloxacin 500 MG tablet    CIPRO    14 tablet    Take 1 tablet (500 mg) by mouth 2 times daily       * CITRACAL + D 315-200 MG-UNIT Tabs per tablet   Generic drug:  calcium " citrate-vitamin D      take  by mouth. 1 in pm, 2 in am       * calcium citrate-vitamin D 315-200 MG-UNIT Tabs per tablet    CITRACAL         conjugated estrogens cream    PREMARIN    30 g    Place 0.5 g vaginally twice a week Begin with pea-size amount placed in vagina, nightly x 2 weeks. Then use 2x/week       * cyanocobalamin 1000 MCG tablet    vitamin  B-12     take 1,000 mcg by mouth daily.       * cyanocobalamin 1000 MCG Tabs          diphenhydrAMINE 50 MG capsule    BENADRYL     Take 50 mg by mouth       FLINTSTONES MULTIVITAMIN OR      None Entered       NORCO 5-325 MG per tablet   Generic drug:  HYDROcodone-acetaminophen          nortriptyline 10 MG capsule    PAMELOR     Take 20 mg by mouth       omeprazole 20 MG tablet    priLOSEC OTC     Take 20 mg by mouth       OMEPRAZOLE MAGNESIUM PO      Take 20.6 mg by mouth       * OXYBUTYNIN CHLORIDE PO      Take 18 mg by mouth       * oxybutynin 5 MG tablet    DITROPAN    300 tablet    Take 1 tablet (5 mg) by mouth 3 times daily       oxyCODONE-acetaminophen 5-325 MG per tablet    PERCOCET     Take 1-2 tablets by mouth       * FLUoxetine 20 MG capsule    PROzac     Take 20 mg by mouth       * PROZAC 20 MG capsule   Generic drug:  FLUoxetine      Take 60 mg by mouth daily       trospium 20 MG tablet    SANCTURA     Take 20 mg by mouth       * Notice:  This list has 12 medication(s) that are the same as other medications prescribed for you. Read the directions carefully, and ask your doctor or other care provider to review them with you.

## 2017-04-18 ENCOUNTER — DOCUMENTATION ONLY (OUTPATIENT)
Dept: UROLOGY | Facility: CLINIC | Age: 62
End: 2017-04-18

## 2017-04-18 VITALS
DIASTOLIC BLOOD PRESSURE: 62 MMHG | HEIGHT: 64 IN | HEART RATE: 69 BPM | BODY MASS INDEX: 27.83 KG/M2 | WEIGHT: 163 LBS | SYSTOLIC BLOOD PRESSURE: 115 MMHG

## 2017-04-18 NOTE — PROGRESS NOTES
Slim Mcbride comes into clinic today at the request of Dr Dimas Ordering Provider for UDS.    This service provided today was under the supervising provider of the day Dr. Cobos, who was available if needed.    Aidan Vazquez    INDICATIONS FOR PROCEDURE:  Ms. Slim Mcbride is a pleasant 61 year old female with dysuria, recurrent UTI, and urinary urgency.     PROCEDURE:    1.Complex filling cystourethrogram with measurement of bladder and rectal pressures.  2.Complex voiding cystourethrogram with measurement of bladder and rectal pressures.  3.Electromyography during urodynamics.  4.Uroflowmetry.  5.Sterile urethral catheterization for measurement of postvoid residual urine volume.    PROCEDURE NOTES :   Patient reports that she typically leaks when walking.  Catheterized 180 ml urine at start of study.  At around 295 ml, patient demonstrated incontinence and leaked all the saline.  This was a very forceful strong gushing flow which forced the catheter out of the urethra--this was replaced with more tape after the leak.    On third fill, patient was given permission to void when she reported small urge--to allow us to measure pressures when attempting to void and before she leaked again.    VOIDING DIARY:  Patient did not provide.    DESCRIPTION OF PROCEDURE:  Risks, benefits, and alternatives were discussed with the patient and they wished to proceed. Urodynamics is planned to better assess the primary etiology for Ms. Mcbride's urologic dysfunction.   After informed consent was obtained, the patient was taken to the procedure room where uroflowmetry was performed. Findings below. Next a triple-lumen urodynamics catheter was inserted in the bladder. A rectal manometry catheter was placed in the rectum. EMG pads were placed on either side of the anal verge. The bladder was filled with sterile saline at 25-50 cc/minute and serial pressures were recorded.     Pre-test urine dipstick was negative for nitrites and  leukocytes.    UROFLOW:  Unable to urinate pre-study.    DURING THE FILLING PHASE:  At the following volumes the patient experienced:  First sensation: not reported  First Desire: 208 ml  Strong Desire: 578 ml  Maximum Capacity: not reported    DURING THE VOIDING PHASE:  Peak Detrusor Contraction:28.7 cmH2O  Voided volume: 250.9 ml  Maximum flow rate: 38.3 ml/sec  Average flow rate: 16.1 ml/sec  Post void Residual: 39 ml      A single ciprofloxacin antibiotic was provided for UTI prophylaxis per protocol, following completion of study.         Thank you for allowing me to participate in the care of  Ms. Slim Mcbride.  Aidan Vazquez LPN    April 18, 2017

## 2017-04-24 ENCOUNTER — PRE VISIT (OUTPATIENT)
Dept: UROLOGY | Facility: CLINIC | Age: 62
End: 2017-04-24

## 2017-04-24 NOTE — TELEPHONE ENCOUNTER
Patient with mixed urinary incontinence coming in to review UDS. Chart reviewed and all records are available. No need for a call.

## 2017-04-27 ENCOUNTER — OFFICE VISIT (OUTPATIENT)
Dept: UROLOGY | Facility: CLINIC | Age: 62
End: 2017-04-27

## 2017-04-27 VITALS
DIASTOLIC BLOOD PRESSURE: 70 MMHG | SYSTOLIC BLOOD PRESSURE: 108 MMHG | HEIGHT: 64 IN | WEIGHT: 163.9 LBS | HEART RATE: 64 BPM | BODY MASS INDEX: 27.98 KG/M2

## 2017-04-27 DIAGNOSIS — N39.46 MIXED STRESS AND URGE URINARY INCONTINENCE: Primary | ICD-10-CM

## 2017-04-27 ASSESSMENT — PAIN SCALES - GENERAL: PAINLEVEL: NO PAIN (0)

## 2017-04-27 NOTE — MR AVS SNAPSHOT
After Visit Summary   4/27/2017    Slim Mcbride    MRN: [id number]           Patient Information     Date Of Birth          1955        Visit Information        Provider Department      4/27/2017 8:30 AM Milagro Dimas MD [facility] Urology and [facility] Urologic [facility]        Today's Diagnoses     Mixed stress and urge urinary incontinence    -  1      Care Instructions    Stop the oxybutynin and the immediate release trospium    Take the extended release trospium once a day    Take daily fiber to avoid constipation    When you urinate double void     Contact our office in about one month to let us know how you are doing on the trospium.  If you think you still are having more symptoms the we will start you on the mirabegron at that time.    Return to see us in about 3 months to reassess and make sure you are emptying your bladder        Follow-ups after your visit        Your next 10 appointments already scheduled     Jul 20, 2017 11:15 AM CDT   (Arrive by 11:00 AM)   Return Visit with Milagro Dimas MD   [facility] Urology and [facility]rolog[facility] ([facility] [place])    [address] [place] [address] [phone]              Who to contact     Please call your clinic at [phone] to:    Ask questions about your health    Make or cancel appointments    Discuss your medicines    Learn about your test results    Speak to your doctor   If you have compliments or concerns about an experience at your clinic, or if you wish to file a complaint, please contact [facility] Patient Relations at [phone] or email us at [email or web address]         Additional Information About Your Visit        MyChart Information     [employer] gives you secure access to your electronic health record. If you see a primary care provider, you can also send messages to your care team and make  "appointments. If you have questions, please call your primary care clinic.  If you do not have a primary care provider, please call [phone] and they will assist you.      MPV is an electronic gateway that provides easy, online access to your medical records. With MPV, you can request a clinic appointment, read your test results, renew a prescription or communicate with your care team.     To access your existing account, please contact your [facility] or call [phone] for assistance.        Care EveryWhere ID     This is your Care EveryWhere ID. This could be used by other organizations to access your [place] medical records  [id number]        Your Vitals Were     Pulse Height BMI (Body Mass Index)             64 1.626 m (5' 4\") 28.13 kg/m2          Blood Pressure from Last 3 Encounters:   04/27/17 108/70   04/12/17 115/62   03/23/17 114/71    Weight from Last 3 Encounters:   04/27/17 74.3 kg (163 lb 14.4 oz)   04/12/17 73.9 kg (163 lb)   03/23/17 73 kg (161 lb)              Today, you had the following     No orders found for display         Today's Medication Changes          These changes are accurate as of: 4/27/17  9:07 AM.  If you have any questions, ask your nurse or doctor.               These medicines have changed or have updated prescriptions.        Dose/Directions    aspirin 81 MG tablet   This may have changed:  Another medication with the same name was removed. Continue taking this medication, and follow the directions you see here.   Changed by:  Milagro Dimas MD        Dose:  81 mg   Take 81 mg by mouth   Refills:  0       calcium citrate-vitamin D 315-200 MG-UNIT Tabs per tablet   Commonly known as:  CITRACAL   This may have changed:  Another medication with the same name was removed. Continue taking this medication, and follow the directions you see here.   Changed by:  Milagro Dimas MD        Refills:  0       cyanocobalamin " 1000 MCG Tabs   This may have changed:  Another medication with the same name was removed. Continue taking this medication, and follow the directions you see here.   Changed by:  Milagro Dimas MD        Refills:  0       fexofenadine 180 MG tablet   Commonly known as:  ALLEGRA   This may have changed:  Another medication with the same name was removed. Continue taking this medication, and follow the directions you see here.   Changed by:  Milagro Dimas MD        Refills:  0       FLUoxetine 20 MG capsule   Commonly known as:  PROzac   This may have changed:  Another medication with the same name was removed. Continue taking this medication, and follow the directions you see here.   Changed by:  Milagro Dimas MD        Dose:  20 mg   Take 20 mg by mouth   Refills:  0       oxybutynin 5 MG tablet   Commonly known as:  DITROPAN   This may have changed:  Another medication with the same name was removed. Continue taking this medication, and follow the directions you see here.   Used for:  Urinary urgency, Urge incontinence of urine   Changed by:  Candice Chao PA-C        Dose:  5 mg   Take 1 tablet (5 mg) by mouth 3 times daily   Quantity:  300 tablet   Refills:  3         Stop taking these medicines if you haven't already. Please contact your care team if you have questions.     ciprofloxacin 500 MG tablet   Commonly known as:  CIPRO   Stopped by:  Milagro Dimas MD           FLINTSTONES MULTIVITAMIN OR   Stopped by:  Milagro Dimas MD           NORCO 5-325 MG per tablet   Generic drug:  HYDROcodone-acetaminophen   Stopped by:  Milagro Dimas MD           OMEPRAZOLE MAGNESIUM PO   Stopped by:  Milagro Dimas MD           oxyCODONE-acetaminophen 5-325 MG per tablet   Commonly known as:  PERCOCET   Stopped by:  Milagro Dimas MD           trospium 20 MG tablet   Commonly known as:  SANCTURA   Stopped by:  Milagro Dimas MD                     Primary Care Provider Office Phone # Fax #    Joe Cardenas [phone]       [address] [address]        Thank you!     Thank you for choosing [facility] UROLOGY AND [facility] FOR PROSTATE AND UROLOGIC CANCERS  for your care. Our goal is always to provide you with excellent care. Hearing back from our patients is one way we can continue to improve our services. Please take a few minutes to complete the written survey that you may receive in the mail after your visit with us. Thank you!             Your Updated Medication List - Protect others around you: Learn how to safely use, store and throw away your medicines at www.disposemymeds.org.          This list is accurate as of: 4/27/17  9:07 AM.  Always use your most recent med list.                   Brand Name Dispense Instructions for use    aspirin 81 MG tablet      Take 81 mg by mouth       calcium citrate-vitamin D 315-200 MG-UNIT Tabs per tablet    CITRACAL         CENTRUM SILVER Chew      take  by mouth. 2 daily       conjugated estrogens cream    PREMARIN    30 g    Place 0.5 g vaginally twice a week Begin with pea-size amount placed in vagina, nightly x 2 weeks. Then use 2x/week       cyanocobalamin 1000 MCG Tabs          diphenhydrAMINE 50 MG capsule    BENADRYL     Take 50 mg by mouth       fexofenadine 180 MG tablet    ALLEGRA         FLUoxetine 20 MG capsule    PROzac     Take 20 mg by mouth       nortriptyline 10 MG capsule    PAMELOR     Take 20 mg by mouth       omeprazole 20 MG tablet    priLOSEC OTC     Take 20 mg by mouth       oxybutynin 5 MG tablet    DITROPAN    300 tablet    Take 1 tablet (5 mg) by mouth 3 times daily

## 2017-04-27 NOTE — PATIENT INSTRUCTIONS
Stop the oxybutynin and the immediate release trospium    Take the extended release trospium once a day    Take daily fiber to avoid constipation    When you urinate double void     Contact our office in about one month to let us know how you are doing on the trospium.  If you think you still are having more symptoms the we will start you on the mirabegron at that time.    Return to see us in about 3 months to reassess and make sure you are emptying your bladder

## 2017-04-27 NOTE — LETTER
"4/27/2017       RE: Slim Mcbride  [address] [facility] [address]     Dear Colleague,    Thank you for referring your patient, Slim Mcbride, to the [facility] UROLOGY AND INST FOR PROSTATE [facility]OLOG[facility] Please see a copy of my visit note below.    April 27, 2017    Return visit    Patient returns today for follow up.  She is here to discuss urodynamics.  She reports that she still has residual issues from her concussion.  It is unclear why but she is taking immediate release oxybutynin and trospium.  Does have issues with constipation.  She denies any changes in her health since last visit.    Per patient the biggest issue that she has is that she has episodes of sudden urge incontinence, not daily but often enough that it is bothersome and she cannot predict when they will happen as there is no obvious pattern from her recollection or the voiding diary.      /70  Pulse 64  Ht 1.626 m (5' 4\")  Wt 74.3 kg (163 lb 14.4 oz)  BMI 28.13 kg/m2  She is comfortable, in no distress, non-labored breathing.      Urodynamics reviewed:  On initial testing her first desire was at 208mL and at a volume of 295mL it appears that she had a sudden urge and leaked out the entire volume.  Catheters at that time appear to have dropped out so unable to document DOI.  On this initial phase of filling she appears to have some impaired compliance with a Pdet max of 34 cm H2O.  When patient was filled the second time she demonstrated normal compliance with a strong desire at 283mL and what seems to be stress induced DOI.  She was able to void 250mL with Qmax 38, Qave 16 and post test PVR of 39mL    A/P: 61 year old F with mixed urinary incontinence, constipation    We discussed it was unclear about the differences in the detrusor pressures from the two waves of the study, but given normal renal function and no hydronephrosis on recent CT will just plan to reassess " if needed.    As she has had some improvement with medications but issues with constipation will have her stop the oxybutynin and immediate release trospium  Will start daily extended release trospium.  Have chosen tropsium to avoid the confusion that can happen with other anticholinergics especially as patient still has issues after her concussion.    Daily fiber for the constipation    She will contact the office in one month to see how she is doing on the trospium and at that time can consider starting 25mg mirabegeron.  We discussed the common side effects of this    RTC in 3 months, to reassess and check PVR, sooner if needed    15 minutes were spent with the patient today, > 50% in counseling and coordination of care    Milagro Dimas MD MPH   of Urology    CC  Patient Care Team:  Joe Cardenas MD as PCP - General (Family Practice)  Kelsey Kenyon NP as MD (Nurse Practitioner)  Kayla Amador PA-C as Physician Assistant (Physician Assistant)  Milagro Dimas MD as MD (Urology)  ESTABLISHED PATIENT

## 2017-04-28 RX ORDER — TROSPIUM CHLORIDE ER 60 MG/1
60 CAPSULE ORAL EVERY MORNING
Qty: 30 EACH | Refills: 3 | Status: SHIPPED | OUTPATIENT
Start: 2017-04-28 | End: 2017-11-16

## 2017-06-15 ENCOUNTER — TELEPHONE (OUTPATIENT)
Dept: UROLOGY | Facility: CLINIC | Age: 62
End: 2017-06-15

## 2017-06-15 DIAGNOSIS — R35.0 URINARY FREQUENCY: ICD-10-CM

## 2017-06-15 DIAGNOSIS — R35.0 URINARY FREQUENCY: Primary | ICD-10-CM

## 2017-06-15 DIAGNOSIS — N39.0 URINARY TRACT INFECTION: Primary | ICD-10-CM

## 2017-06-15 LAB
ALBUMIN UR-MCNC: NEGATIVE MG/DL
APPEARANCE UR: CLEAR
BACTERIA #/AREA URNS HPF: ABNORMAL /HPF
BILIRUB UR QL STRIP: NEGATIVE
COLOR UR AUTO: YELLOW
GLUCOSE UR STRIP-MCNC: NEGATIVE MG/DL
HGB UR QL STRIP: NEGATIVE
KETONES UR STRIP-MCNC: NEGATIVE MG/DL
LEUKOCYTE ESTERASE UR QL STRIP: ABNORMAL
MUCOUS THREADS #/AREA URNS LPF: PRESENT /LPF
NITRATE UR QL: POSITIVE
PH UR STRIP: 6 PH (ref 5–7)
RBC #/AREA URNS AUTO: 1 /HPF (ref 0–2)
SP GR UR STRIP: 1.01 (ref 1–1.03)
URN SPEC COLLECT METH UR: ABNORMAL
UROBILINOGEN UR STRIP-MCNC: 0 MG/DL (ref 0–2)
WBC #/AREA URNS AUTO: 14 /HPF (ref 0–2)

## 2017-06-15 RX ORDER — SULFAMETHOXAZOLE/TRIMETHOPRIM 800-160 MG
1 TABLET ORAL 2 TIMES DAILY
Qty: 10 TABLET | Refills: 0 | Status: SHIPPED | OUTPATIENT
Start: 2017-06-15 | End: 2017-06-20

## 2017-06-15 NOTE — TELEPHONE ENCOUNTER
Pt callign with UTI symptoms: frequency and leakage. No blood, no dysuria, no fever/chills. Orders for UA/UC placed.   Thanks  Romelia

## 2017-06-15 NOTE — NURSING NOTE
Patient here in clinic to ask for antibiotics   Her urinalysis was positive  Updated Dr Dimas  Symptoms are urinary urgency  Laura Yuan, RN   Care Coordinator Urology

## 2017-06-17 ENCOUNTER — HEALTH MAINTENANCE LETTER (OUTPATIENT)
Age: 62
End: 2017-06-17

## 2017-06-30 ENCOUNTER — TELEPHONE (OUTPATIENT)
Dept: UROLOGY | Facility: CLINIC | Age: 62
End: 2017-06-30

## 2017-06-30 DIAGNOSIS — R30.0 DYSURIA: ICD-10-CM

## 2017-06-30 DIAGNOSIS — R39.15 URINARY URGENCY: Primary | ICD-10-CM

## 2017-06-30 DIAGNOSIS — N39.46 MIXED INCONTINENCE URGE AND STRESS (MALE)(FEMALE): Primary | ICD-10-CM

## 2017-06-30 DIAGNOSIS — R32 URINARY INCONTINENCE: ICD-10-CM

## 2017-06-30 DIAGNOSIS — N39.46 MIXED INCONTINENCE URGE AND STRESS (MALE)(FEMALE): ICD-10-CM

## 2017-06-30 LAB
ALBUMIN UR-MCNC: NEGATIVE MG/DL
APPEARANCE UR: CLEAR
BACTERIA #/AREA URNS HPF: ABNORMAL /HPF
BILIRUB UR QL STRIP: NEGATIVE
COLOR UR AUTO: YELLOW
GLUCOSE UR STRIP-MCNC: NEGATIVE MG/DL
HGB UR QL STRIP: NEGATIVE
KETONES UR STRIP-MCNC: NEGATIVE MG/DL
LEUKOCYTE ESTERASE UR QL STRIP: ABNORMAL
MUCOUS THREADS #/AREA URNS LPF: PRESENT /LPF
NITRATE UR QL: NEGATIVE
PH UR STRIP: 7 PH (ref 5–7)
RBC #/AREA URNS AUTO: 1 /HPF (ref 0–2)
SP GR UR STRIP: 1.01 (ref 1–1.03)
SQUAMOUS #/AREA URNS AUTO: 2 /HPF (ref 0–1)
URN SPEC COLLECT METH UR: ABNORMAL
UROBILINOGEN UR STRIP-MCNC: 0 MG/DL (ref 0–2)
WBC #/AREA URNS AUTO: 3 /HPF (ref 0–2)

## 2017-06-30 RX ORDER — SULFAMETHOXAZOLE/TRIMETHOPRIM 800-160 MG
1 TABLET ORAL 2 TIMES DAILY
Qty: 10 TABLET | Refills: 0 | Status: SHIPPED | OUTPATIENT
Start: 2017-06-30 | End: 2017-07-05

## 2017-06-30 NOTE — TELEPHONE ENCOUNTER
Patient is having symptoms of UTI.   Urinary urgency and urinary incontinence.  Denies fever, chills, nausea, vomiting.  No pain.  Patient is going out of town this afternoon and would like to start treatment.    Discussed with Dr Dimas.  Ok to start abx.    Notified patient of recommendations from physician.  Patient verbalized understanding. No further questions.   RX sent to patient's pharmacy.      Diana Wheat RN-BC, BSN  Care Coordinator - Reconstructive Urology

## 2017-07-01 LAB
BACTERIA SPEC CULT: NO GROWTH
Lab: NORMAL
MICRO REPORT STATUS: NORMAL
SPECIMEN SOURCE: NORMAL

## 2017-07-18 ENCOUNTER — PRE VISIT (OUTPATIENT)
Dept: UROLOGY | Facility: CLINIC | Age: 62
End: 2017-07-18

## 2017-07-18 NOTE — TELEPHONE ENCOUNTER
Patient with a history of mixed incontinence coming in for a PVR. Chart reviewed and all records available. No need for a call.

## 2017-07-20 ENCOUNTER — OFFICE VISIT (OUTPATIENT)
Dept: UROLOGY | Facility: CLINIC | Age: 62
End: 2017-07-20

## 2017-07-20 VITALS
WEIGHT: 167 LBS | BODY MASS INDEX: 27.82 KG/M2 | HEART RATE: 106 BPM | DIASTOLIC BLOOD PRESSURE: 67 MMHG | HEIGHT: 65 IN | SYSTOLIC BLOOD PRESSURE: 108 MMHG

## 2017-07-20 DIAGNOSIS — N39.46 MIXED INCONTINENCE URGE AND STRESS (MALE)(FEMALE): Primary | ICD-10-CM

## 2017-07-20 DIAGNOSIS — N39.0 RECURRENT UTI: ICD-10-CM

## 2017-07-20 RX ORDER — TIZANIDINE 2 MG/1
2 TABLET ORAL
COMMUNITY
End: 2018-02-28

## 2017-07-20 RX ORDER — TRIMETHOPRIM 100 MG/1
100 TABLET ORAL DAILY
Qty: 30 TABLET | Refills: 3 | Status: SHIPPED | OUTPATIENT
Start: 2017-07-20 | End: 2017-11-17

## 2017-07-20 RX ORDER — TROSPIUM CHLORIDE ER 60 MG/1
60 CAPSULE ORAL EVERY MORNING
Qty: 90 EACH | Refills: 3 | Status: SHIPPED | OUTPATIENT
Start: 2017-07-20 | End: 2018-07-15

## 2017-07-20 RX ORDER — PHENTERMINE HYDROCHLORIDE 30 MG/1
30 CAPSULE ORAL
COMMUNITY
Start: 2017-05-24 | End: 2018-02-28

## 2017-07-20 ASSESSMENT — PAIN SCALES - GENERAL: PAINLEVEL: NO PAIN (0)

## 2017-07-20 NOTE — LETTER
"7/20/2017       RE: Slim Mcbride  [address] [facility] [address]     Dear Colleague,    Thank you for referring your patient, Slim Mcbride, to the [facility] UROLOGY AND INST FOR PROSTATE AND UROLOGIC CANCERS at [facility]. Please see a copy of my visit note below.    July 20, 2017    Return visit    Patient returns today for follow up.  Sanctura is working well for her urge incontinence and is very happy about this.  She is still concerned about UTI as she had another UTI in June.  She denies any changes in her health since last visit.    /67  Pulse 106  Ht 1.651 m (5' 5\")  Wt 75.8 kg (167 lb)  BMI 27.79 kg/m2  She is comfortable, in no distress, non-labored breathing.       mL by bladder scan    A/P: 62 year old F with mixed urinary incontinence, Indra    Continue Sanctura    Continue vaginal estrogen cream 2-3x a week at night    For the Indra we discussed that she is already using the vaginal estrogen cream other options include daily prophylactic antibiotic, methenamine, or supplements like cranberry, d-mannose, vitamin C, probiotics.  We discussed that for cranberry supplements I would recommend ellura or theracran as these products measure the level of proanthocyandin the active ingredient in cranberry.  At this time she has elected for a prophylactic antibiotic-will try a short course for a few months    Again reminded her that if she thinks she has an infection she needs to come in for a CATHETERIZED urine specimen    RTC 4 months, sooner if needed    15 minutes were spent with the patient today, > 50% in counseling and coordination of care    Milagro Dimas MD MPH   of Urology    CC  Patient Care Team:  Joe Cardenas MD as PCP - General (Family Practice)  Kelsey Kenyon NP as MD (Nurse Practitioner)  Kayla Amador PA-C as Physician Assistant (Physician Assistant)            "

## 2017-07-20 NOTE — PATIENT INSTRUCTIONS
Continue the Sanctura    Take the daily antibiotic, if you think you have an infection return here for a catheterized urine    Supplements we discussed include vitamin C, probiotics, cranberry and d-mannose    Ellura: www.myellura.com, coupon code cfmd    Therpranav AMEZCUA by [employer]  [facility] [address]    Return to see us in about 4 months, sooner if needed

## 2017-07-20 NOTE — MR AVS SNAPSHOT
After Visit Summary   7/20/2017    Slim Mcbride    MRN: [id number]           Patient Information     Date Of Birth          1955        Visit Information        Provider Department      7/20/2017 11:15 AM Milagro Dimas MD [facility] Urology and [facility] for Prostate and Urologic Cancers        Today's Diagnoses     Mixed incontinence urge and stress (male)(female)    -  1    Recurrent UTI          Care Instructions    Continue the Sanctura    Take the daily antibiotic, if you think you have an infection return here for a catheterized urine    Supplements we discussed include vitamin C, probiotics, cranberry and d-mannose    Ellura: www.myellura.com, coupon code cfmd    Theracran HP by [employer]  [facility] [id number]    Return to see us in about 4 months, sooner if needed          Follow-ups after your visit        Your next 10 appointments already scheduled     Nov 16, 2017 11:15 AM CST   (Arrive by 11:00 AM)   Return Visit with Milagro Dimas MD   [facility] Urology and [facility] for Prostate and Urologic Cancers ([facility] [place])    [address] [place] [address] [phone]              Who to contact     Please call your clinic at [phone] to:    Ask questions about your health    Make or cancel appointments    Discuss your medicines    Learn about your test results    Speak to your doctor   If you have compliments or concerns about an experience at your clinic, or if you wish to file a complaint, please contact [facility] Patient Relations at [phone] or email us at [email or web address]         Additional Information About Your Visit        MyChart Information     [employer] gives you secure access to your electronic health record. If you see a primary care provider, you can also send messages to your care team and make appointments. If you have questions, please call your primary care clinic.  If you do  "not have a primary care provider, please call [phone] and they will assist you.      [employer] is an electronic gateway that provides easy, online access to your medical records. With [employer], you can request a clinic appointment, read your test results, renew a prescription or communicate with your care team.     To access your existing account, please contact your [facility] or call [phone] for assistance.        Care EveryWhere ID     This is your Care EveryWhere ID. This could be used by other organizations to access your [place] medical records  [id number]        Your Vitals Were     Pulse Height BMI (Body Mass Index)             106 1.651 m (5' 5\") 27.79 kg/m2          Blood Pressure from Last 3 Encounters:   07/20/17 108/67   04/27/17 108/70   04/12/17 115/62    Weight from Last 3 Encounters:   07/20/17 75.8 kg (167 lb)   04/27/17 74.3 kg (163 lb 14.4 oz)   04/12/17 73.9 kg (163 lb)              Today, you had the following     No orders found for display         Today's Medication Changes          These changes are accurate as of: 7/20/17 11:43 AM.  If you have any questions, ask your nurse or doctor.               Start taking these medicines.        Dose/Directions    trimethoprim 100 MG tablet   Commonly known as:  TRIMPEX   Used for:  Recurrent UTI   Started by:  Milagro Dimas MD        Dose:  100 mg   Take 1 tablet (100 mg) by mouth daily   Quantity:  30 tablet   Refills:  3         These medicines have changed or have updated prescriptions.        Dose/Directions    * trospium 60 MG Cp24 24 hr capsule   Commonly known as:  SANCTURA XR   This may have changed:  Another medication with the same name was added. Make sure you understand how and when to take each.   Used for:  Mixed stress and urge urinary incontinence   Changed by:  Milagro Dimas MD        Dose:  60 mg   Take 1 capsule (60 mg) by mouth every morning   Quantity:  30 each   Refills: "  3       * trospium 60 MG Cp24 24 hr capsule   Commonly known as:  SANCTURA XR   This may have changed:  You were already taking a medication with the same name, and this prescription was added. Make sure you understand how and when to take each.   Used for:  Mixed incontinence urge and stress (male)(female)   Changed by:  Milagro Dimas MD        Dose:  60 mg   Take 1 capsule (60 mg) by mouth every morning   Quantity:  90 each   Refills:  3       * Notice:  This list has 2 medication(s) that are the same as other medications prescribed for you. Read the directions carefully, and ask your doctor or other care provider to review them with you.         Where to get your medicines      These medications were sent to [address] [facility]  [address] [facility] [address]     Phone:  [phone]     trimethoprim 100 MG tablet    trospium 60 MG Cp24 24 hr capsule                Primary Care Provider Office Phone # Fax #    Joe Cardenas [phone]       [facility] [id number] [place] [address]        Equal Access to Services     [facility]: Hadii magui ku hadasho Soomaali, waaxda luqadaha, qaybta kaalmada adeegyada, rosanne mishra haydaxa brock . So [facility] [phone].    ATENCIÓN: Si habla español, tiene a azevedo disposición servicios gratuitos de asistencia lingüística. Llame al [phone].    We comply with applicable federal civil rights laws and [place] laws. We do not discriminate on the basis of race, color, national origin, age, disability sex, sexual orientation or gender identity.            Thank you!     Thank you for choosing [facility] UROLOGY AND [facility] FOR PROSTATE AND UROLOGIC CANCERS  for your care. Our goal is always to provide you with excellent care. Hearing back from our patients is one way we can continue to improve our services. Please take a few minutes to complete the written survey that you  may receive in the mail after your visit with us. Thank you!             Your Updated Medication List - Protect others around you: Learn how to safely use, store and throw away your medicines at www.disposemymeds.org.          This list is accurate as of: 7/20/17 11:43 AM.  Always use your most recent med list.                   Brand Name Dispense Instructions for use Diagnosis    aspirin 81 MG tablet      Take 81 mg by mouth        calcium citrate-vitamin D 315-200 MG-UNIT Tabs per tablet    CITRACAL          CENTRUM SILVER Chew      take  by mouth. 2 daily        conjugated estrogens cream    PREMARIN    30 g    Place 0.5 g vaginally twice a week Begin with pea-size amount placed in vagina, nightly x 2 weeks. Then use 2x/week    Vaginal dryness       cyanocobalamin 1000 MCG Tabs           diphenhydrAMINE 50 MG capsule    BENADRYL     Take 50 mg by mouth        fexofenadine 180 MG tablet    ALLEGRA          FLUoxetine 20 MG capsule    PROzac     Take 20 mg by mouth        nortriptyline 10 MG capsule    PAMELOR     Take 20 mg by mouth        omeprazole 20 MG tablet    priLOSEC OTC     Take 20 mg by mouth        oxybutynin 5 MG tablet    DITROPAN    300 tablet    Take 1 tablet (5 mg) by mouth 3 times daily    Urinary urgency, Urge incontinence of urine       phentermine 30 MG capsule      Take 30 mg by mouth        tiZANidine 2 MG tablet    ZANAFLEX     Take 2 mg by mouth        trimethoprim 100 MG tablet    TRIMPEX    30 tablet    Take 1 tablet (100 mg) by mouth daily    Recurrent UTI       * trospium 60 MG Cp24 24 hr capsule    SANCTURA XR    30 each    Take 1 capsule (60 mg) by mouth every morning    Mixed stress and urge urinary incontinence       * trospium 60 MG Cp24 24 hr capsule    SANCTURA XR    90 each    Take 1 capsule (60 mg) by mouth every morning    Mixed incontinence urge and stress (male)(female)       * Notice:  This list has 2 medication(s) that are the same as other medications prescribed for you.  Read the directions carefully, and ask your doctor or other care provider to review them with you.

## 2017-07-20 NOTE — PROGRESS NOTES
"July 20, 2017    Return visit    Patient returns today for follow up.  Sanctura is working well for her urge incontinence and is very happy about this.  She is still concerned about UTI as she had another UTI in June.  She denies any changes in her health since last visit.    /67  Pulse 106  Ht 1.651 m (5' 5\")  Wt 75.8 kg (167 lb)  BMI 27.79 kg/m2  She is comfortable, in no distress, non-labored breathing.       mL by bladder scan    A/P: 62 year old F with mixed urinary incontinence, Indra    Continue Sanctura    Continue vaginal estrogen cream 2-3x a week at night    For the Indra we discussed that she is already using the vaginal estrogen cream other options include daily prophylactic antibiotic, methenamine, or supplements like cranberry, d-mannose, vitamin C, probiotics.  We discussed that for cranberry supplements I would recommend ellura or theracran as these products measure the level of proanthocyandin the active ingredient in cranberry.  At this time she has elected for a prophylactic antibiotic-will try a short course for a few months    Again reminded her that if she thinks she has an infection she needs to come in for a CATHETERIZED urine specimen    RTC 4 months, sooner if needed    15 minutes were spent with the patient today, > 50% in counseling and coordination of care    Milagro Dimas MD MPH   of Urology    CC  Patient Care Team:  Joe Cardenas MD as PCP - General (Family Practice)  Kelsey Kenyon NP as MD (Nurse Practitioner)  Kayla Amador PA-C as Physician Assistant (Physician Assistant)  Milagro Dimas MD as MD (Urology)  ESTABLISHED PATIENT              "

## 2017-11-07 ENCOUNTER — PRE VISIT (OUTPATIENT)
Dept: UROLOGY | Facility: CLINIC | Age: 62
End: 2017-11-07

## 2017-11-16 ENCOUNTER — OFFICE VISIT (OUTPATIENT)
Dept: UROLOGY | Facility: CLINIC | Age: 62
End: 2017-11-16

## 2017-11-16 VITALS
DIASTOLIC BLOOD PRESSURE: 70 MMHG | SYSTOLIC BLOOD PRESSURE: 107 MMHG | BODY MASS INDEX: 28.17 KG/M2 | WEIGHT: 165 LBS | HEIGHT: 64 IN | HEART RATE: 100 BPM

## 2017-11-16 DIAGNOSIS — N39.46 MIXED STRESS AND URGE URINARY INCONTINENCE: Primary | ICD-10-CM

## 2017-11-16 ASSESSMENT — PAIN SCALES - GENERAL: PAINLEVEL: NO PAIN (0)

## 2017-11-16 NOTE — PROGRESS NOTES
"November 16, 2017    Return visit    Patient returns today for follow up for her mixed incontinence.  She still notes some bothersome leakage with the medications.  Wishes to ultimately proceed with surgery but not until February as she is planning on going to [facility] with her daughter.  She denies any changes in her health since last visit.    /70  Pulse 100  Ht 1.626 m (5' 4\")  Wt 74.8 kg (165 lb)  BMI 28.32 kg/m2  She is comfortable, in no distress, non-labored breathing.      A/P: 62 year old F with mixed incontinence, persistent symptoms    Consider starting mirabegeron but needs to clear with her neurologist as she is on nortriptyline, although she has been considering stopping it    We discussed CHRISTIAN and the treatments to include observation, weight loss, pelvic floor physical therapy, urethral bulking, and surgeries most commonly synthetic midurethral sling or autologous rectus fascial sling.  We discussed the FDA mesh warning on vaginal mesh for prolapse and that this differs from the midurethral sling.  We further gave the patient a copy of the AUGS/SUFU position statement on the use of midurethral slings.      We discussed that the risks to the procedure include but not limited to bleeding, infection, injury to adjacent organs including bowel, bladder, and blood vessels, de elia or worsening stress incontinence or urge incontinence, need for post-operative dyson catheter, need for further procedures including for mesh extrusion or erosion.  Patient expressed understanding and agreeable to proceed.  Will plan on proceeding with a retropubic midurethral sling, cystoscopy after she returns from [facility]    RT in February, PAC visit same day    25 minutes were spent with the patient today, > 50% in counseling and coordination of care    Milagro Dimas MD MPH   of Urology    CC  Patient Care Team:  Joe Cardenas MD as PCP - General (Family Practice)  Kelsey Kenyon NP as MD " (Nurse Practitioner)  Kayla Amador PA-C as Physician Assistant (Physician Assistant)  Milagro Dimas MD as MD (Urology)  SELF, REFERRED

## 2017-11-16 NOTE — PATIENT INSTRUCTIONS
Please return to see us in February after your trip in [facility], will arrange for you to see anesthesia this day as well    Please check with your neurologist about starting mirabegron because it can interact with your nortriptyline (can cause pauses in your heart).  Would prefer you can stop the nortriptyline before trying the mirabegron.

## 2017-11-16 NOTE — MR AVS SNAPSHOT
After Visit Summary   11/16/2017    Slim Mcbride    MRN: [id number]           Patient Information     Date Of Birth          1955        Visit Information        Provider Department      11/16/2017 11:15 AM Milagro Dimas MD [facility] Urology and [facility] for Prostate and Urologic Cancers        Today's Diagnoses     Mixed stress and urge urinary incontinence    -  1      Care Instructions    Please return to see us in February after your trip in [facility], will arrange for you to see anesthesia this day as well    Please check with your neurologist about starting mirabegron because it can interact with your nortriptyline (can cause pauses in your heart).  Would prefer you can stop the nortriptyline before trying the mirabegron.                Follow-ups after your visit        Your next 10 appointments already scheduled     Feb 22, 2018 12:15 PM CST   (Arrive by 12:00 PM)   Return Visit with MD MIRANDA Arroyo [facility] Urology and [facility] for Prostate and Urologic Cancers ([facility])    [address] [place] [id number] [phone]              Who to contact     Please call your clinic at [phone] to:    Ask questions about your health    Make or cancel appointments    Discuss your medicines    Learn about your test results    Speak to your doctor   If you have compliments or concerns about an experience at your clinic, or if you wish to file a complaint, please contact [facility] Patient Relations at [phone] or email us at [email or web address]         Additional Information About Your Visit        MyChart Information     [employer] gives you secure access to your electronic health record. If you see a primary care provider, you can also send messages to your care team and make appointments. If you have questions, please call your primary care clinic.  If you do not have a primary care  "provider, please call [phone] and they will assist you.      [employer] is an electronic gateway that provides easy, online access to your medical records. With [employer], you can request a clinic appointment, read your test results, renew a prescription or communicate with your care team.     To access your existing account, please contact your [facility] or call [phone] for assistance.        Care EveryWhere ID     This is your Care EveryWhere ID. This could be used by other organizations to access your [place] medical records  [id number]        Your Vitals Were     Pulse Height BMI (Body Mass Index)             100 1.626 m (5' 4\") 28.32 kg/m2          Blood Pressure from Last 3 Encounters:   11/16/17 107/70   07/20/17 108/67   04/27/17 108/70    Weight from Last 3 Encounters:   11/16/17 74.8 kg (165 lb)   07/20/17 75.8 kg (167 lb)   04/27/17 74.3 kg (163 lb 14.4 oz)              We Performed the Following     Radha-Operative Worksheet (Urology)          Today's Medication Changes          These changes are accurate as of: 11/16/17 11:24 AM.  If you have any questions, ask your nurse or doctor.               These medicines have changed or have updated prescriptions.        Dose/Directions    trospium 60 MG Cp24 24 hr capsule   Commonly known as:  SANCTURA XR   This may have changed:  Another medication with the same name was removed. Continue taking this medication, and follow the directions you see here.   Used for:  Mixed incontinence urge and stress (male)(female)   Changed by:  Milagro Dimas MD        Dose:  60 mg   Take 1 capsule (60 mg) by mouth every morning   Quantity:  90 each   Refills:  3                Primary Care Provider Office Phone # Fax #    Joe Cardenas [phone]       [address] [address]        Equal Access to Services     FRANCES PATEL AH: corrie Rasheed, " rosanne miner ah. So [facility] [phone].    ATENCIÓN: Si mili clark, tiene a azevedo disposición servicios gratuitos de asistencia lingüística. Lida al [phone].    We comply with applicable federal civil rights laws and [place] laws. We do not discriminate on the basis of race, color, national origin, age, disability, sex, sexual orientation, or gender identity.            Thank you!     Thank you for choosing [facility] UROLOGY AND [facility] FOR PROSTATE AND UROLOGIC CANCERS  for your care. Our goal is always to provide you with excellent care. Hearing back from our patients is one way we can continue to improve our services. Please take a few minutes to complete the written survey that you may receive in the mail after your visit with us. Thank you!             Your Updated Medication List - Protect others around you: Learn how to safely use, store and throw away your medicines at www.disposemymeds.org.          This list is accurate as of: 11/16/17 11:24 AM.  Always use your most recent med list.                   Brand Name Dispense Instructions for use Diagnosis    aspirin 81 MG tablet      Take 81 mg by mouth        calcium citrate-vitamin D 315-200 MG-UNIT Tabs per tablet    CITRACAL          CENTRUM SILVER Chew      take  by mouth. 2 daily        conjugated estrogens cream    PREMARIN    30 g    Place 0.5 g vaginally twice a week Begin with pea-size amount placed in vagina, nightly x 2 weeks. Then use 2x/week    Vaginal dryness       cyanocobalamin 1000 MCG Tabs           diphenhydrAMINE 50 MG capsule    BENADRYL     Take 50 mg by mouth        fexofenadine 180 MG tablet    ALLEGRA          FLUoxetine 20 MG capsule    PROzac     Take 20 mg by mouth        nortriptyline 10 MG capsule    PAMELOR     Take 20 mg by mouth        omeprazole 20 MG tablet    priLOSEC OTC     Take 20 mg by mouth        phentermine 30 MG capsule      Take 30 mg by mouth         tiZANidine 2 MG tablet    ZANAFLEX     Take 2 mg by mouth        trimethoprim 100 MG tablet    TRIMPEX    30 tablet    Take 1 tablet (100 mg) by mouth daily    Recurrent UTI       trospium 60 MG Cp24 24 hr capsule    SANCTURA XR    90 each    Take 1 capsule (60 mg) by mouth every morning    Mixed incontinence urge and stress (male)(female)

## 2017-11-16 NOTE — LETTER
"11/16/2017       RE: Slim Mcbride  [address] [facility] [address]     Dear Colleague,    Thank you for referring your patient, Slim Mcbride, to the [facility] UROLOGY AND INST FOR PROSTATE AND UROLOGIC CANCERS at [facility]. Please see a copy of my visit note below.    November 16, 2017    Return visit    Patient returns today for follow up for her mixed incontinence.  She still notes some bothersome leakage with the medications.  Wishes to ultimately proceed with surgery but not until February as she is planning on going to [facility] with her daughter.  She denies any changes in her health since last visit.    /70  Pulse 100  Ht 1.626 m (5' 4\")  Wt 74.8 kg (165 lb)  BMI 28.32 kg/m2  She is comfortable, in no distress, non-labored breathing.      A/P: 62 year old F with mixed incontinence, persistent symptoms    Consider starting mirabegeron but needs to clear with her neurologist as she is on nortriptyline, although she has been considering stopping it    We discussed CHRISTIAN and the treatments to include observation, weight loss, pelvic floor physical therapy, urethral bulking, and surgeries most commonly synthetic midurethral sling or autologous rectus fascial sling.  We discussed the FDA mesh warning on vaginal mesh for prolapse and that this differs from the midurethral sling.  We further gave the patient a copy of the AUGS/SUFU position statement on the use of midurethral slings.      We discussed that the risks to the procedure include but not limited to bleeding, infection, injury to adjacent organs including bowel, bladder, and blood vessels, de elia or worsening stress incontinence or urge incontinence, need for post-operative dyson catheter, need for further procedures including for mesh extrusion or erosion.  Patient expressed understanding and agreeable to proceed.  Will plan on proceeding with a retropubic midurethral sling, cystoscopy after she returns " from [facility]    RTC in February, PAC visit same day    25 minutes were spent with the patient today, > 50% in counseling and coordination of care    Milagro Dimas MD MPH   of Urology    CC  Patient Care Team:  Joe Cardenas MD as PCP - General (Family Practice)  Kelsey Kenyon NP as MD (Nurse Practitioner)  Kayla Amador PA-C as Physician Assistant (Physician Assistant)  Milagro Dimas MD as MD (Urology)  SELF, REFERRED

## 2017-11-16 NOTE — NURSING NOTE
Chief Complaint   Patient presents with     RECHECK     urinary symptoms coming in for follow up       Marcia Love MA

## 2017-11-17 DIAGNOSIS — N39.0 RECURRENT UTI: ICD-10-CM

## 2017-11-20 DIAGNOSIS — N39.0 RECURRENT UTI: ICD-10-CM

## 2017-11-20 RX ORDER — TRIMETHOPRIM 100 MG/1
100 TABLET ORAL DAILY
Qty: 30 TABLET | Refills: 3 | Status: SHIPPED | OUTPATIENT
Start: 2017-11-20 | End: 2018-08-05

## 2017-11-20 RX ORDER — TRIMETHOPRIM 100 MG/1
TABLET ORAL
Qty: 30 TABLET | Refills: 3 | Status: SHIPPED | OUTPATIENT
Start: 2017-11-20 | End: 2017-11-20

## 2017-12-14 ENCOUNTER — TELEPHONE (OUTPATIENT)
Dept: UROLOGY | Facility: CLINIC | Age: 62
End: 2017-12-14

## 2017-12-14 DIAGNOSIS — N32.81 OVERACTIVE BLADDER: Primary | ICD-10-CM

## 2017-12-14 RX ORDER — MIRABEGRON 25 MG/1
25 TABLET, FILM COATED, EXTENDED RELEASE ORAL DAILY
Qty: 30 TABLET | Refills: 3 | Status: SHIPPED | OUTPATIENT
Start: 2017-12-14 | End: 2018-04-04

## 2017-12-14 NOTE — TELEPHONE ENCOUNTER
Patient called and dr magen salmon to order myrbetriq 25 mg called into [facility], KAREEMN Staff Nurse

## 2017-12-14 NOTE — TELEPHONE ENCOUNTER
----- Message from Darren Monson LPN sent at 12/13/2017  4:08 PM CST -----  Contact: [phone]  Patient called and is off nortriptyline and you wanted to start her on something could you place the orders and I will let her know..  Use [facility] pharmacy in [place] darren

## 2018-01-05 DIAGNOSIS — R35.0 URINARY FREQUENCY: Primary | ICD-10-CM

## 2018-02-13 ENCOUNTER — PRE VISIT (OUTPATIENT)
Dept: UROLOGY | Facility: CLINIC | Age: 63
End: 2018-02-13

## 2018-02-13 NOTE — TELEPHONE ENCOUNTER
Pt coming in for a presurgical appointment. Chart reviewed and PAC appointments are scheduled. No need for a call.

## 2018-02-20 ENCOUNTER — ANESTHESIA EVENT (OUTPATIENT)
Dept: SURGERY | Facility: CLINIC | Age: 63
End: 2018-02-20

## 2018-02-20 PROBLEM — J30.9 ALLERGIC RHINITIS: Status: ACTIVE | Noted: 2018-02-20

## 2018-02-20 PROBLEM — E53.8 B12 DEFICIENCY: Status: ACTIVE | Noted: 2018-02-20

## 2018-02-28 ENCOUNTER — OFFICE VISIT (OUTPATIENT)
Dept: SURGERY | Facility: CLINIC | Age: 63
End: 2018-02-28
Payer: COMMERCIAL

## 2018-02-28 ENCOUNTER — APPOINTMENT (OUTPATIENT)
Dept: SURGERY | Facility: CLINIC | Age: 63
End: 2018-02-28
Payer: COMMERCIAL

## 2018-02-28 ENCOUNTER — ALLIED HEALTH/NURSE VISIT (OUTPATIENT)
Dept: SURGERY | Facility: CLINIC | Age: 63
End: 2018-02-28
Payer: COMMERCIAL

## 2018-02-28 VITALS
WEIGHT: 167.2 LBS | OXYGEN SATURATION: 97 % | BODY MASS INDEX: 28.54 KG/M2 | DIASTOLIC BLOOD PRESSURE: 80 MMHG | SYSTOLIC BLOOD PRESSURE: 115 MMHG | TEMPERATURE: 98.4 F | HEART RATE: 70 BPM | HEIGHT: 64 IN | RESPIRATION RATE: 16 BRPM

## 2018-02-28 DIAGNOSIS — Z01.818 PREOP EXAMINATION: Primary | ICD-10-CM

## 2018-02-28 DIAGNOSIS — N39.3 SUI (STRESS URINARY INCONTINENCE, FEMALE): ICD-10-CM

## 2018-02-28 DIAGNOSIS — Z01.818 PREOP EXAMINATION: ICD-10-CM

## 2018-02-28 DIAGNOSIS — R35.0 URINARY FREQUENCY: ICD-10-CM

## 2018-02-28 LAB
ALBUMIN UR-MCNC: NEGATIVE MG/DL
ANION GAP SERPL CALCULATED.3IONS-SCNC: 4 MMOL/L (ref 3–14)
APPEARANCE UR: ABNORMAL
BACTERIA #/AREA URNS HPF: ABNORMAL /HPF
BILIRUB UR QL STRIP: NEGATIVE
BUN SERPL-MCNC: 19 MG/DL (ref 7–30)
CALCIUM SERPL-MCNC: 8.8 MG/DL (ref 8.5–10.1)
CHLORIDE SERPL-SCNC: 103 MMOL/L (ref 94–109)
CO2 SERPL-SCNC: 29 MMOL/L (ref 20–32)
COLOR UR AUTO: YELLOW
CREAT SERPL-MCNC: 1.09 MG/DL (ref 0.52–1.04)
ERYTHROCYTE [DISTWIDTH] IN BLOOD BY AUTOMATED COUNT: 13.1 % (ref 10–15)
GFR SERPL CREATININE-BSD FRML MDRD: 50 ML/MIN/1.7M2
GLUCOSE SERPL-MCNC: 80 MG/DL (ref 70–99)
GLUCOSE UR STRIP-MCNC: NEGATIVE MG/DL
HCT VFR BLD AUTO: 42.9 % (ref 35–47)
HGB BLD-MCNC: 13.4 G/DL (ref 11.7–15.7)
HGB UR QL STRIP: NEGATIVE
KETONES UR STRIP-MCNC: NEGATIVE MG/DL
LEUKOCYTE ESTERASE UR QL STRIP: ABNORMAL
MCH RBC QN AUTO: 30.9 PG (ref 26.5–33)
MCHC RBC AUTO-ENTMCNC: 31.2 G/DL (ref 31.5–36.5)
MCV RBC AUTO: 99 FL (ref 78–100)
MUCOUS THREADS #/AREA URNS LPF: PRESENT /LPF
NITRATE UR QL: POSITIVE
PH UR STRIP: 7 PH (ref 5–7)
PLATELET # BLD AUTO: 289 10E9/L (ref 150–450)
POTASSIUM SERPL-SCNC: 4.8 MMOL/L (ref 3.4–5.3)
RBC # BLD AUTO: 4.34 10E12/L (ref 3.8–5.2)
RBC #/AREA URNS AUTO: 0 /HPF (ref 0–2)
SODIUM SERPL-SCNC: 136 MMOL/L (ref 133–144)
SOURCE: ABNORMAL
SP GR UR STRIP: 1.01 (ref 1–1.03)
SQUAMOUS #/AREA URNS AUTO: <1 /HPF (ref 0–1)
UROBILINOGEN UR STRIP-MCNC: 0 MG/DL (ref 0–2)
WBC # BLD AUTO: 7.6 10E9/L (ref 4–11)
WBC #/AREA URNS AUTO: 3 /HPF (ref 0–2)

## 2018-02-28 ASSESSMENT — ENCOUNTER SYMPTOMS: ORTHOPNEA: 0

## 2018-02-28 ASSESSMENT — LIFESTYLE VARIABLES: TOBACCO_USE: 1

## 2018-02-28 NOTE — H&P
Pre-Operative H & P     Date of Encounter: 2/28/2018  Primary Care Physician:  Joe Cardenas    CC: Recurrent urinary tract infections, urinary frequency/urgency, and urge incontinence.      HPI:  Slim Mcbride is a 62 year old female who presents for pre-operative H & P in preparation for Cystoscopy with Retropubic Midurethral Sling on 3/12/18 with Dr. Milagro Dimas at [facility].     The patient was first evaluated by Dr. Dimas on 2/23/17 in regards to recurrent urinary tract infections, urinary frequency/urgency, and urge incontinence.  Despite treatment with medications, she continues to have issues with leakage of urine.  Arrangements are now being made for the above procedures, in an attempt to alleviate the patient's symptoms of stress urinary incontinence.  History is obtained from the patient and the medical record.    Past Medical History:  Past Medical History:   Diagnosis Date     History of alcoholism (H)      History of esophageal stricture     Requiring dilation     History of gastric bypass      History of hepatitis     In 1973, unknown type     History of pyelonephritis      History of tobacco use      Hx of sepsis     Pyelonephritis, ICU admission for 4 days      Major depression      Post concussive syndrome      Recurrent UTI (urinary tract infection)      Past Surgical History:  Past Surgical History:   Procedure Laterality Date     ANKLE SURGERY      Repair of injury after a sledding accident.     ARTHROSCOPY KNEE Right      BREAST BIOPSY, RT/LT      Results negative     GASTRIC BYPASS  05/2002     HC ESOPHAGOSCOPY, DIAGNOSTIC      Estimated 6 times.     HERNIA REPAIR, INCISIONAL      In 3/2004 and 9/2015     LAPAROTOMY EXPLORATORY  10/2011    Repair of perforated bowel     ROTATOR CUFF REPAIR RT/LT Right 2000     ROTATOR CUFF REPAIR RT/LT Left 2002     TONSILLECTOMY       TUBAL LIGATION  1985     Hx of Blood transfusions/reactions: Denies.    Hx of abnormal bleeding or  anti-platelet use: Patient instructed to hold ASA in preparation for surgery.      Menstrual history: No LMP recorded. Patient is postmenopausal.    Steroid use in the last year: Denies.    Personal or FH of difficulty with anesthesia: Denies.    Prior to admission medications  Current Outpatient Prescriptions   Medication Sig Dispense Refill     FLUOXETINE HCL PO Take 80 mg by mouth daily       MAGNESIUM OXIDE PO Take 400 mg by mouth daily       ACETAMINOPHEN PO Take 1,000 mg by mouth daily as needed for pain       mirabegron (MYRBETRIQ) 25 MG 24 hr tablet Take 1 tablet (25 mg) by mouth daily 30 tablet 3     trimethoprim (TRIMPEX) 100 MG tablet Take 1 tablet (100 mg) by mouth daily 30 tablet 3     trospium (SANCTURA XR) 60 MG CP24 24 hr capsule Take 1 capsule (60 mg) by mouth every morning 90 each 3     nortriptyline (PAMELOR) 10 MG capsule Take 20 mg by mouth       fexofenadine (ALLEGRA) 180 MG tablet Take 180 mg by mouth daily        calcium citrate-vitamin D (CITRACAL) 315-200 MG-UNIT TABS per tablet Take 1 tablet by mouth daily        diphenhydrAMINE (BENADRYL) 50 MG capsule Take 50 mg by mouth nightly as needed        cyanocobalamin 1000 MCG TABS Take 1,000 mcg by mouth daily        aspirin 81 MG tablet Take 81 mg by mouth every evening        conjugated estrogens (PREMARIN) cream Place 0.5 g vaginally twice a week Begin with pea-size amount placed in vagina, nightly x 2 weeks. Then use 2x/week (Patient not taking: Reported on 2/28/2018) 30 g 1     Multiple Vitamins-Minerals (CENTRUM SILVER) CHEW Take 1 tablet by mouth daily        Allergies  Allergies   Allergen Reactions     Nkda [No Known Drug Allergies]      Social History  Social History     Social History     Marital status:      Spouse name: N/A     Number of children: N/A     Years of education: N/A     Occupational History     Not on file.     Social History Main Topics     Smoking status: Former Smoker     Packs/day: 1.00     Years: 8.00      Types: Cigarettes     Quit date: 1978     Smokeless tobacco: Never Used     Alcohol use Yes      Comment: 2-3 drinks one to two times a month.       Drug use: No     Sexual activity: Yes     Partners: Male     Birth control/ protection: Post-menopausal, Female Surgical     Other Topics Concern     Not on file     Social History Narrative     Family History  Family History   Problem Relation Age of Onset     DIABETES Father      Cardiovascular Father      Pacemaker     Breast Cancer Maternal Grandmother      Myocardial Infarction Paternal Grandmother      HEART DISEASE Paternal Grandfather      Myocardial Infarction Paternal Grandfather      CEREBROVASCULAR DISEASE Brother      Thyroid Disease Daughter      CEREBROVASCULAR DISEASE Sister 30     Multiple     Lung Cancer Sister      Other - See Comments Sister      Fibromyalgia     Glaucoma Mother      LUNG DISEASE Mother      Lung nodules     Osteoarthritis Mother      Brain Tumor Sister      No Known Problems Brother      Other - See Comments Brother       at age 1 year.     No Known Problems Sister      Review of Systems  Functional status: Independent in ADL's.  4 METS.     The complete review of systems is negative other than noted in the HPI or here.   Constitutional: Denies recent changes in weight, sleeping patterns, or fevers/chills.  Eyes: No recent vision changes.  EENT: Denies recent changes in hearing, mouth pain, or difficulty swallowing.  Cardiovascular: Denies chest pain, MENJIVAR or orthopnea, or palpitations.  Respiratory: Denies shortness of breath or significant cough.    GI: Denies nausea/vomiting or diarrhea/constipation.    : Continues to have issues with urinary urgency/frequency and urge incontinence, but does not have UTI symptoms.    Musculoskeletal: Denies joint pain or swelling.    Skin: Denies rashes or wounds.    Hematologic: Denies easy bruising or bleeding.    Neurologic: Denies migraines, seizures, dizziness,  "numbness/tingling.  Psychiatric: Denies changes in mood or affect.      /80  Pulse 70  Temp 98.4  F (36.9  C) (Oral)  Resp 16  Ht 1.626 m (5' 4\")  Wt 75.8 kg (167 lb 3.2 oz)  SpO2 97%  BMI 28.7 kg/m2    167 lbs 3.2 oz  5' 4\"   Body mass index is 28.7 kg/(m^2).    Physical Exam  Constitutional: Patient awake, seated upright in a chair, in no apparent distress.  Appears stated age.  Eyes: Pupils equal, round and reactive to light.  Extra ocular muscles intact. Sclera clear.  Conjunctiva normal.  HENT: Head normocephalic.  Oral pharynx intact with moist mucous membranes.  Dentition intact.  No thyromegaly appreciated.   Respiratory: Lung sounds clear to auscultation bilaterally.  No rales, rhonchi, or wheezing noted.    Cardiovascular: S1, S2, regular rate and rhythm.  No murmurs, rubs, or gallops noted. Radial and pedal pulses palpable, bilaterally.  No edema noted.   GI: Bowel sounds present.  Abdomen rounded, soft, non-tender to light palpation.  No hepatosplenomegaly or masses palpated.   Genitourinary: Exam deferred.  Lymph/Hematologic: No cervical or supraclavicular lymphadenopathy noted.  No excessive bruising noted.    Skin: Color appropriate for race, warm, dry.    Musculoskeletal: Full extension of the neck.  No redness, warmth, or swelling of the joints noted. Gross motor strength is normal.    Neurologic: Alert, oriented to name, place and time. Cranial nerves II-XII are grossly intact. Gait is normal.      Neuropsychiatric: Calm, cooperative. Normal affect.     Labs:  2/28/18: WBC:  7.6; Hgb: 13.4; Hct: 42.9; Plt: 289  2/28/18: Na: 136; K: 4.8; Cl: 103; Glu: 80; BUN: 19; Cr: 1.09; Ca: 8.8    Lab results were personally reviewed by this provider.        Assessment and Plan  Slim Mcbride is a 62 year old female scheduled to undergo Cystoscopy with Retropubic Midurethral Sling on 3/12/18 with Dr. Milagro Dimas.    She has the following specific operative considerations:   1.  Increased risk of " postoperative nausea/vomiting: Recommend use of antiemetic agents in the perioperative period.    2.  History of esophageal strictures requiring dilation: Recommend that extra caution be exercised should a gastric tube need to be placed during the procedure.  3.  CBC, BMP, UA today.    Revised Cardiac Risk Index: 0.9% risk of major adverse cardiac event.  ASHLYN risk: Low  PONV risk score= 3.  (If > 2, anti-emetic intervention is recommended.)  Anesthesia considerations: Refer to PAC assessment in the anesthesia records.    Katarina Ospina NP  Preoperative Assessment Center  [facility]  Phone: [phone]  Fax: [phone]

## 2018-02-28 NOTE — MR AVS SNAPSHOT
After Visit Summary   2/28/2018    Slim Mcbride    MRN: [id number]           Patient Information     Date Of Birth          1955        Visit Information        Provider Department      2/28/2018 2:00 PM Pharmacist, Juan Mcmahon [facility] [place]        Today's Diagnoses     Preop examination    -  1       Follow-ups after your visit        Your next 10 appointments already scheduled     Feb 28, 2018  2:30 PM CST   (Arrive by 2:15 PM)   PAC EVALUATION with Juan Pac Nancy 3   [facility] Preoperative Assessment [place] ([facility])    [address] [place] [id number]   [phone]            Feb 28, 2018  3:30 PM CST   (Arrive by 3:15 PM)   PAC RN ASSESSMENT with Juan Pac Rn   [facility] [place] ([facility])    [address] [place] [id number] [phone]            Feb 28, 2018  4:10 PM CST   (Arrive by 3:55 PM)   PAC Anesthesia Consult with Juan Pac Anesthesiologist   [facility] [place] ([facility])    [address] [place] [id number] [phone]            Feb 28, 2018  4:30 PM CST   LAB with  LAB   [facility] Lab ([facility])    [address] [place] [id number] [phone]           Please do not eat 10-12 hours before your appointment if you are coming in fasting for labs on lipids, cholesterol, or glucose (sugar). This does not apply to pregnant women. Water, hot tea and black coffee (with nothing added) are okay. Do not drink other fluids, diet soda or chew gum.            Mar 08, 2018  3:00 PM CST   (Arrive by 2:45 PM)   Return Visit with Milagro Dimas MD   [facility] Urology and Inst for Prostate and Urologic Cancers ([facility])    [address] [place] [id number]    [phone]            Mar 12, 2018   Procedure with Milagro Dimas MD   [facility] ([facility] [place])    [address] [place] [address]  5th [facility] [id number] [phone]           Located in the Clinics and Surgery Center at [address] [place] [address].   parking is very convenient and highly recommended.  is a $6 flat rate fee.  Both  and self parkers should enter the main arrival plaza from [facility]; parking attendants will direct you based on your parking preference.            Mar 29, 2018 11:15 AM CDT   (Arrive by 11:00 AM)   Post-Op with Milagro Dimas MD   [facility]rology and [facility] for Prostate and Urologic Cancers ([facility])    [address] [place] [id number] [phone]            Apr 26, 2018 11:00 AM CDT   (Arrive by 10:45 AM)   Post-Op with Milagro Dimas MD   [facility] and [facility] for Prostate and Urologic Cancers ([facility])    [address] [place] [address] [phone]              Who to contact     Please call your clinic at [phone] to:    Ask questions about your health    Make or cancel appointments    Discuss your medicines    Learn about your test results    Speak to your doctor            Additional Information About Your Visit        Inkerwang Information     [employer] gives you secure access to your electronic health record. If you see a primary care provider, you can also send messages to your care team and make appointments. If you have questions, please call your primary care clinic.  If you do not have a primary care provider, please call [phone] and they will assist you.      [employer] is an electronic gateway that provides easy, online access to your medical records. With [employer], you can request a clinic appointment, read your test results, renew a  prescription or communicate with your care team.     To access your existing account, please contact your [facility] or call [phone] for assistance.        Care EveryWhere ID     This is your Care EveryWhere ID. This could be used by other organizations to access your [place] medical records  [id number]         Blood Pressure from Last 3 Encounters:   11/16/17 107/70   07/20/17 108/67   04/27/17 108/70    Weight from Last 3 Encounters:   11/16/17 74.8 kg (165 lb)   07/20/17 75.8 kg (167 lb)   04/27/17 74.3 kg (163 lb 14.4 oz)              Today, you had the following     No orders found for display       Primary Care Provider Office Phone # Fax #    Joe Cardenas [phone]       [facility] [id number] [place] [id number]        Equal Access to Services     FRANCES PATEL : Hadii aad ku hadasho Soomaali, waaxda luqadaha, qaybta kaalmada adeegyada, waxay idiin hayaan jaret brock . So [facility] [phone].    ATENCIÓN: Si habla español, tiene a azevedo disposición servicios gratuitos de asistencia lingüística. [facility] [phone].    We comply with applicable federal civil rights laws and [place] laws. We do not discriminate on the basis of race, color, national origin, age, disability, sex, sexual orientation, or gender identity.            Thank you!     Thank you for choosing [facility] PREOPER[facility]  for your care. Our goal is always to provide you with excellent care. Hearing back from our patients is one way we can continue to improve our services. Please take a few minutes to complete the written survey that you may receive in the mail after your visit with us. Thank you!             Your Updated Medication List - Protect others around you: Learn how to safely use, store and throw away your medicines at www.disposemymeds.org.          This list is accurate as of 2/28/18  2:26 PM.  Always use your most recent med  list.                   Brand Name Dispense Instructions for use Diagnosis    ACETAMINOPHEN PO      Take 1,000 mg by mouth daily as needed for pain        aspirin 81 MG tablet      Take 81 mg by mouth every evening        calcium citrate-vitamin D 315-200 MG-UNIT Tabs per tablet    CITRACAL     Take 1 tablet by mouth daily        CENTRUM SILVER Chew      Take 1 tablet by mouth daily        conjugated estrogens cream    PREMARIN    30 g    Place 0.5 g vaginally twice a week Begin with pea-size amount placed in vagina, nightly x 2 weeks. Then use 2x/week    Vaginal dryness       cyanocobalamin 1000 MCG Tabs      Take 1,000 mcg by mouth daily        diphenhydrAMINE 50 MG capsule    BENADRYL     Take 50 mg by mouth nightly as needed        fexofenadine 180 MG tablet    ALLEGRA     Take 180 mg by mouth daily        FLUOXETINE HCL PO      Take 80 mg by mouth daily        MAGNESIUM OXIDE PO      Take 400 mg by mouth daily        mirabegron 25 MG 24 hr tablet    MYRBETRIQ    30 tablet    Take 1 tablet (25 mg) by mouth daily    Overactive bladder       nortriptyline 10 MG capsule    PAMELOR     Take 20 mg by mouth        trimethoprim 100 MG tablet    TRIMPEX    30 tablet    Take 1 tablet (100 mg) by mouth daily    Recurrent UTI       trospium 60 MG Cp24 24 hr capsule    SANCTURA XR    90 each    Take 1 capsule (60 mg) by mouth every morning    Mixed incontinence urge and stress (male)(female)

## 2018-02-28 NOTE — PATIENT INSTRUCTIONS
Preparing for Your Surgery      Name:  Slim Mcbride   MRN:  [id number]   :  1955   Today's Date:  2018     Arriving for surgery:  Surgery date:  3/12/2018  Arrival time:  5:45 am  Please come to:     [facility]  [address] [place] [id number]     Parking is available in front of the Clinics and Surgery Center building from 5:30AM to 8:00PM.  -  Proceed to the 5th floor to check into the Ambulatory Surgery Center.              >> There will be patient concierges on the 1st and 5th floor, for assistance or an escort, if you would like.              >> Please call [phone] with any questions.    What can I eat or drink?  -  You may have solid food or milk products until 8 hours prior to your surgery.  -  You may have water, apple juice or 7up/Sprite until 2 hours prior to your surgery.    Which medicines can I take?  -  Do NOT take these medications in the morning, the day of surgery:  Hold aspirin 5 days , no vitamins the day of surgery    -  Please take these medications the day of surgery:   fluoxetine, mirabegron, trimethoprim, Sanctura, nortriptyline, allegra    How do I prepare myself?  -  Take two showers: one the night before surgery; and one the morning of surgery.         Use Scrubcare or Hibiclens to wash from neck down.  You may use your own shampoo and conditioner. No other hair products.   -  Do NOT use lotion, powder, deodorant, or antiperspirant the day of your surgery.  -  Do NOT wear any makeup, fingernail polish or jewelry.  -Do not bring your own medications to the hospital, except for inhalers and eye drops.  -  Bring your ID and insurance card.    Questions or Concerns:  If you have questions or concerns, please call the  Preoperative Assessment Center, Monday-Friday 7AM-7PM:  [phone]    AFTER YOUR SURGERY  Breathing exercises   Breathing exercises help you recover faster. Take deep breaths and let the air out slowly. This will:      Help you wake up after surgery.    Help prevent complications like pneumonia.  Preventing complications will help you go home sooner.   We may give you a breathing device (incentive spirometer) to encourage you to breathe deeply.   Nausea and vomiting   You may feel sick to your stomach after surgery; if so, let your nurse know.    Pain control:  After surgery, you may have pain. Our goal is to help you manage your pain. Pain medicine will help you feel comfortable enough to do activities that will help you heal.  These activities may include breathing exercises, walking and physical therapy.   To help your health care team treat your pain we will ask: 1) If you have pain  2) where it is located 3) describe your pain in your words  Methods of pain control include medications given by mouth, vein or by nerve block for some surgeries.  We may give you a pain control pump that will:  1) Deliver the medicine through a tube placed in your vein  2) Control the amount of medicine you receive  3) Allow you to push a button to deliver a dose of pain medicine  Sequential Compression Device (SCD) or Pneumo Boots:  You may need to wear SCD S on your legs or feet. These are wraps connected to a machine that pumps in air and releases it. The repeated pumping helps prevent blood clots from forming.

## 2018-02-28 NOTE — PROGRESS NOTES
Preoperative Assessment Center medication history for February 28, 2018 is complete.    See Epic admission navigator for allergy information, pharmacy and prior to admission medications.    Operating room staff will still need to confirm medications and last dose information on day of surgery.     Medication history interview sources:  Patient Interview and [facility]    Changes made to PTA medication list (reason)  Added:   -- magnesium per patient  -- acetaminophen per patient    Deleted:   -- tizanidine - patient reports no longer using  -- omeprazole - patient reports no longer using  -- phentermine - patient reports no longer using    Changed:   -- updated dose of fluoxetine per pharmacy    Additional medication history information (including reliability of information, actions taken by pharmacist):    -- No recent (within 30 days) course of antibiotics  -- No recent (within 30 days) course of steroids  -- No recent (within 30 days) chronic daily medications stopped   -- Patient declines being on any other prescription or over-the-counter medications    Prior to Admission medications    Medication Sig Last Dose Taking? Auth Provider   FLUOXETINE HCL PO Take 80 mg by mouth daily  Yes Unknown, Entered By History   MAGNESIUM OXIDE PO Take 400 mg by mouth daily Taking Yes Unknown, Entered By History   ACETAMINOPHEN PO Take 1,000 mg by mouth daily as needed for pain Taking Yes Unknown, Entered By History   mirabegron (MYRBETRIQ) 25 MG 24 hr tablet Take 1 tablet (25 mg) by mouth daily Taking Yes Milagro Dimas MD   trimethoprim (TRIMPEX) 100 MG tablet Take 1 tablet (100 mg) by mouth daily Taking Yes Milagro Dimas MD   trospium (SANCTURA XR) 60 MG CP24 24 hr capsule Take 1 capsule (60 mg) by mouth every morning Taking Yes Milagro Dimas MD   fexofenadine (ALLEGRA) 180 MG tablet Take 180 mg by mouth daily  Taking Yes Reported, Patient   calcium citrate-vitamin D (CITRACAL) 315-200 MG-UNIT  TABS per tablet Take 1 tablet by mouth daily  Taking Yes Reported, Patient   diphenhydrAMINE (BENADRYL) 50 MG capsule Take 50 mg by mouth nightly as needed  Taking Yes Reported, Patient   cyanocobalamin 1000 MCG TABS Take 1,000 mcg by mouth daily  Taking Yes Reported, Patient   aspirin 81 MG tablet Take 81 mg by mouth every evening  Taking Yes Reported, Patient   Multiple Vitamins-Minerals (CENTRUM SILVER) CHEW Take 1 tablet by mouth daily  Taking Yes Reported, Patient   nortriptyline (PAMELOR) 10 MG capsule Take 20 mg by mouth   Reported, Patient   conjugated estrogens (PREMARIN) cream Place 0.5 g vaginally twice a week Begin with pea-size amount placed in vagina, nightly x 2 weeks. Then use 2x/week  Patient not taking: Reported on 2/28/2018 Not Taking  Kayla Amador PA-C         Medication history completed by: Viktor Carver RPH

## 2018-03-02 LAB
BACTERIA SPEC CULT: ABNORMAL
Lab: ABNORMAL
SPECIMEN SOURCE: ABNORMAL

## 2018-03-05 ENCOUNTER — PRE VISIT (OUTPATIENT)
Dept: UROLOGY | Facility: CLINIC | Age: 63
End: 2018-03-05

## 2018-03-06 DIAGNOSIS — N39.0 URINARY TRACT INFECTION: Primary | ICD-10-CM

## 2018-03-06 RX ORDER — CIPROFLOXACIN 500 MG/1
500 TABLET, FILM COATED ORAL 2 TIMES DAILY
Qty: 10 TABLET | Refills: 0 | Status: SHIPPED | OUTPATIENT
Start: 2018-03-06 | End: 2018-03-11

## 2018-03-08 ENCOUNTER — OFFICE VISIT (OUTPATIENT)
Dept: UROLOGY | Facility: CLINIC | Age: 63
End: 2018-03-08
Payer: COMMERCIAL

## 2018-03-08 VITALS
HEART RATE: 75 BPM | WEIGHT: 165 LBS | BODY MASS INDEX: 28.32 KG/M2 | RESPIRATION RATE: 16 BRPM | DIASTOLIC BLOOD PRESSURE: 71 MMHG | SYSTOLIC BLOOD PRESSURE: 112 MMHG

## 2018-03-08 DIAGNOSIS — N39.46 MIXED INCONTINENCE: Primary | ICD-10-CM

## 2018-03-08 RX ORDER — KETOROLAC TROMETHAMINE 10 MG/1
10 TABLET, FILM COATED ORAL EVERY 6 HOURS PRN
COMMUNITY
End: 2018-09-13

## 2018-03-08 ASSESSMENT — PAIN SCALES - GENERAL: PAINLEVEL: NO PAIN (0)

## 2018-03-08 NOTE — NURSING NOTE
Chief Complaint   Patient presents with     Consult     Pt coming in for a presurgical appointment     Joceline Sanders CMA 3:37 PM on 3/8/2018.

## 2018-03-08 NOTE — MR AVS SNAPSHOT
After Visit Summary   3/8/2018    Slim Mcbride    MRN: [id number]           Patient Information     Date Of Birth          1955        Visit Information        Provider Department      3/8/2018 3:00 PM Milagro Dimas MD [facility] Urology and Inst for Prostate and Urologic Cancers        Today's Diagnoses     Mixed incontinence    -  1       Follow-ups after your visit        Your next 10 appointments already scheduled     Mar 12, 2018   Procedure with Milagro Dimas MD   [facility] ([facility])    [address] [place] [id number] [phone]           Located in the Clinics and Surgery Center at [address] [place] [address].   parking is very convenient and highly recommended.  is a $6 flat rate fee.  Both  and self parkers should enter the main arrival plaza from [facility]; parking attendants will direct you based on your parking preference.            Mar 29, 2018 11:15 AM CDT   (Arrive by 11:00 AM)   Post-Op with MD MIRANDA Arroyo [facility] Urology and Inst for Prostate and Urologic Cancers ([facility])    [address] [place] [id number] [phone]            Apr 26, 2018 11:00 AM CDT   (Arrive by 10:45 AM)   Post-Op with MD MIRANDA Arroyo [facility] Urology and Inst for Prostate and Urologic Cancers ([facility])    [address] [place] [id number] [phone]              Who to contact     Please call your clinic at [phone] to:    Ask questions about your health    Make or cancel appointments    Discuss your medicines    Learn about your test results    Speak to your doctor            Additional Information About Your Visit        MyChart Information     [employer] gives you secure access to your electronic health record. If you see a primary  care provider, you can also send messages to your care team and make appointments. If you have questions, please call your primary care clinic.  If you do not have a primary care provider, please call [phone] and they will assist you.      [employer] is an electronic gateway that provides easy, online access to your medical records. With [employer], you can request a clinic appointment, read your test results, renew a prescription or communicate with your care team.     To access your existing account, please contact your [facility] or call [phone] for assistance.        Care EveryWhere ID     This is your Care EveryWhere ID. This could be used by other organizations to access your [place] medical records  [id number]        Your Vitals Were     Pulse Respirations Breastfeeding? BMI (Body Mass Index)          75 16 No 28.32 kg/m2         Blood Pressure from Last 3 Encounters:   03/08/18 112/71   02/28/18 115/80   11/16/17 107/70    Weight from Last 3 Encounters:   03/08/18 74.8 kg (165 lb)   02/28/18 75.8 kg (167 lb 3.2 oz)   11/16/17 74.8 kg (165 lb)              We Performed the Following     CYSTOMETROGRAM SIMPLE        Primary Care Provider Office Phone # Fax #    Joe Cardenas [phone]       [address] [address]        Equal Access to Services     FRANCES PATEL : Hadii magui pritchard hadasho Soaisha, waaxda luqadaha, qaybta kaalmada jaretyada, rosanne montague. So [facility] [phone].    ATENCIÓN: Si habla español, tiene a azevedo disposición servicios gratuitos de asistencia lingüística. Lida al [phone].    We comply with applicable federal civil rights laws and [place] laws. We do not discriminate on the basis of race, color, national origin, age, disability, sex, sexual orientation, or gender identity.            Thank you!     Thank you for choosing [facility] UROLOGY AND [facility] FOR PROSTATE AND  UROLOGIC CANCERS  for your care. Our goal is always to provide you with excellent care. Hearing back from our patients is one way we can continue to improve our services. Please take a few minutes to complete the written survey that you may receive in the mail after your visit with us. Thank you!             Your Updated Medication List - Protect others around you: Learn how to safely use, store and throw away your medicines at www.disposemymeds.org.          This list is accurate as of 3/8/18 11:59 PM.  Always use your most recent med list.                   Brand Name Dispense Instructions for use Diagnosis    ACETAMINOPHEN PO      Take 1,000 mg by mouth daily as needed for pain        aspirin 81 MG tablet      Take 81 mg by mouth every evening        calcium citrate-vitamin D 315-200 MG-UNIT Tabs per tablet    CITRACAL     Take 1 tablet by mouth daily        CENTRUM SILVER Chew      Take 1 tablet by mouth daily        ciprofloxacin 500 MG tablet    CIPRO    10 tablet    Take 1 tablet (500 mg) by mouth 2 times daily for 5 days    Urinary tract infection       conjugated estrogens cream    PREMARIN    30 g    Place 0.5 g vaginally twice a week Begin with pea-size amount placed in vagina, nightly x 2 weeks. Then use 2x/week    Vaginal dryness       cyanocobalamin 1000 MCG Tabs      Take 1,000 mcg by mouth daily        diphenhydrAMINE 50 MG capsule    BENADRYL     Take 50 mg by mouth nightly as needed        fexofenadine 180 MG tablet    ALLEGRA     Take 180 mg by mouth daily        FLUOXETINE HCL PO      Take 80 mg by mouth daily        ketorolac 10 MG tablet    TORADOL     Take 10 mg by mouth every 6 hours as needed for moderate pain        MAGNESIUM OXIDE PO      Take 400 mg by mouth daily        mirabegron 25 MG 24 hr tablet    MYRBETRIQ    30 tablet    Take 1 tablet (25 mg) by mouth daily    Overactive bladder       nortriptyline 10 MG capsule    PAMELOR     Take 20 mg by mouth        trimethoprim 100 MG tablet     TRIMPEX    30 tablet    Take 1 tablet (100 mg) by mouth daily    Recurrent UTI       trospium 60 MG Cp24 24 hr capsule    SANCTURA XR    90 each    Take 1 capsule (60 mg) by mouth every morning    Mixed incontinence urge and stress (male)(female)

## 2018-03-08 NOTE — PROGRESS NOTES
March 8, 2018    Return visit    Patient returns today for follow up for discussion prior to surgery on Monday.  She was in [place] for a medical mission with her [employer] earlier this year.      Patient continues to complain of urine leakage despite medications, pessary trial and pelvic floor therapy.  She notes leakage with coughing, laughing, changing positions and on her way to the washroom.   She also currently in on Ciprofloxacin for a UTI, first one in some time. She denies any changes in her health since last visit.    /71 (BP Location: Right arm, Patient Position: Sitting, Cuff Size: Adult Regular)  Pulse 75  Resp 16  Wt 74.8 kg (165 lb)  Breastfeeding? No  BMI 28.32 kg/m2  She is comfortable, in no distress, non-labored breathing.  Abdomen is soft, non-tender, non-distended.  Normal external female genitalia.  Negative CST.  Speculum and bimanual exam are unremarkable.    Bladder was retrograde filled with about 300 mL of sterile fluid.  At this time with a heave cough there was transurethral loss of fluid noted that triggered a large bladder contraction where she emptied a large amount of fluid (consistent with the stress induced DOI seen on UDS)    A/P: 62 year old F with mixed urinary incontinence with bothersome stress symptoms    Again discussed with patient the goal of the surgery would be to help her stress urinary incontinence symptoms but that her urgency incontinence symptoms may be unchanged or even worsen.  We discussed that she may have to continue the medications and/or even return to pelvic floor therapy.      We discussed that the risks to the procedure include but not limited to bleeding, infection, injury to adjacent organs including bowel, bladder, and blood vessels, conversion to open procedure, persistent stress incontinence, worsening or persistent urge incontinence, postoperative voiding dysfunction, need for post-operative dyson catheter, need for further procedures  including for mesh extrusion or erosion, need for return to pelvic floor therapy.  Patient expressed understanding and agreeable to proceed.    We again discussed urethral bulking versus midurethral sling and patient is most interested in a durable approach    Plan for retropubic midurethral sling, cystoscopy on Monday    She has enough antibiotics with last dose on Sunday    25 minutes were spent with the patient today, > 50% in counseling and coordination of care    Milagro Dimas MD MPH   of Urology    CC  Patient Care Team:  Joe Cardenas MD as PCP - General (Family Practice)  Kelsey Kenyon NP as MD (Nurse Practitioner)  Milagro Dimas MD as MD (Urology)  Laura Yuan, RN as Registered Nurse (Urology)  Joe Cardenas MD as Referring Physician (Family Practice)  SELF, REFERRED

## 2018-03-08 NOTE — LETTER
3/8/2018        RE: Slim Mcbride  [address] [facility] [address]     Dear Colleague,    Thank you for referring your patient, Slim Mcbride, to the [facility] UROLOGY AND INST FOR PROSTATE AND UROLOGIC CANCERS at [facility]. Please see a copy of my visit note below.    March 8, 2018    Return visit    Patient returns today for follow up for discussion prior to surgery on Monday.  She was in [place] for a medical mission with her [employer] earlier this year.      Patient continues to complain of urine leakage despite medications, pessary trial and pelvic floor therapy.  She notes leakage with coughing, laughing, changing positions and on her way to the washroom.   She also currently in on Ciprofloxacin for a UTI, first one in some time. She denies any changes in her health since last visit.    /71 (BP Location: Right arm, Patient Position: Sitting, Cuff Size: Adult Regular)  Pulse 75  Resp 16  Wt 74.8 kg (165 lb)  Breastfeeding? No  BMI 28.32 kg/m2  She is comfortable, in no distress, non-labored breathing.  Abdomen is soft, non-tender, non-distended.  Normal external female genitalia.  Negative CST.  Speculum and bimanual exam are unremarkable.    Bladder was retrograde filled with about 300 mL of sterile fluid.  At this time with a heave cough there was transurethral loss of fluid noted that triggered a large bladder contraction where she emptied a large amount of fluid (consistent with the stress induced DOI seen on UDS)    A/P: 62 year old F with mixed urinary incontinence with bothersome stress symptoms    Again discussed with patient the goal of the surgery would be to help her stress urinary incontinence symptoms but that her urgency incontinence symptoms may be unchanged or even worsen.  We discussed that she may have to continue the medications and/or even return to pelvic floor therapy.      We discussed that the risks to the procedure include but not  limited to bleeding, infection, injury to adjacent organs including bowel, bladder, and blood vessels, conversion to open procedure, persistent stress incontinence, worsening or persistent urge incontinence, postoperative voiding dysfunction, need for post-operative dyson catheter, need for further procedures including for mesh extrusion or erosion, need for return to pelvic floor therapy.  Patient expressed understanding and agreeable to proceed.    We again discussed urethral bulking versus midurethral sling and patient is most interested in a durable approach    Plan for retropubic midurethral sling, cystoscopy on Monday    She has enough antibiotics with last dose on Sunday    25 minutes were spent with the patient today, > 50% in counseling and coordination of care    Milagro Dimas MD MPH   of Urology    CC  Patient Care Team:  Joe Cardenas MD as PCP - General (Family Practice)  Kelsey Kenyon NP as MD (Nurse Practitioner)  Milagro Dimas MD as MD (Urology)  Larua Yuan, RN as Registered Nurse (Urology)  Joe Cardenas MD as Referring Physician (Family Practice)  SELF, REFERRED

## 2018-03-09 ENCOUNTER — ANESTHESIA EVENT (OUTPATIENT)
Dept: SURGERY | Facility: AMBULATORY SURGERY CENTER | Age: 63
End: 2018-03-09

## 2018-03-09 ENCOUNTER — TELEPHONE (OUTPATIENT)
Dept: UROLOGY | Facility: CLINIC | Age: 63
End: 2018-03-09

## 2018-03-12 ENCOUNTER — ANESTHESIA (OUTPATIENT)
Dept: SURGERY | Facility: AMBULATORY SURGERY CENTER | Age: 63
End: 2018-03-12

## 2018-03-12 ENCOUNTER — HOSPITAL ENCOUNTER (OUTPATIENT)
Facility: AMBULATORY SURGERY CENTER | Age: 63
End: 2018-03-12
Attending: UROLOGY
Payer: COMMERCIAL

## 2018-03-12 ENCOUNTER — SURGERY (OUTPATIENT)
Age: 63
End: 2018-03-12

## 2018-03-12 VITALS
WEIGHT: 165 LBS | RESPIRATION RATE: 14 BRPM | BODY MASS INDEX: 27.49 KG/M2 | HEIGHT: 65 IN | DIASTOLIC BLOOD PRESSURE: 71 MMHG | HEART RATE: 71 BPM | TEMPERATURE: 98.7 F | OXYGEN SATURATION: 95 % | SYSTOLIC BLOOD PRESSURE: 114 MMHG

## 2018-03-12 DIAGNOSIS — N39.46 MIXED INCONTINENCE: Primary | ICD-10-CM

## 2018-03-12 DIAGNOSIS — G89.18 POSTOPERATIVE PAIN: ICD-10-CM

## 2018-03-12 DEVICE — SLING URINARY TVT EXACT  TVTRL: Type: IMPLANTABLE DEVICE | Site: VAGINA | Status: FUNCTIONAL

## 2018-03-12 RX ORDER — GABAPENTIN 300 MG/1
300 CAPSULE ORAL ONCE
Status: DISCONTINUED | OUTPATIENT
Start: 2018-03-12 | End: 2018-03-12 | Stop reason: HOSPADM

## 2018-03-12 RX ORDER — PROPOFOL 10 MG/ML
INJECTION, EMULSION INTRAVENOUS PRN
Status: DISCONTINUED | OUTPATIENT
Start: 2018-03-12 | End: 2018-03-12

## 2018-03-12 RX ORDER — HYDROMORPHONE HYDROCHLORIDE 1 MG/ML
.3-.5 INJECTION, SOLUTION INTRAMUSCULAR; INTRAVENOUS; SUBCUTANEOUS EVERY 10 MIN PRN
Status: DISCONTINUED | OUTPATIENT
Start: 2018-03-12 | End: 2018-03-12 | Stop reason: HOSPADM

## 2018-03-12 RX ORDER — ACETAMINOPHEN 325 MG/1
975 TABLET ORAL ONCE
Status: COMPLETED | OUTPATIENT
Start: 2018-03-12 | End: 2018-03-12

## 2018-03-12 RX ORDER — DEXAMETHASONE SODIUM PHOSPHATE 4 MG/ML
INJECTION, SOLUTION INTRA-ARTICULAR; INTRALESIONAL; INTRAMUSCULAR; INTRAVENOUS; SOFT TISSUE PRN
Status: DISCONTINUED | OUTPATIENT
Start: 2018-03-12 | End: 2018-03-12

## 2018-03-12 RX ORDER — ONDANSETRON 2 MG/ML
INJECTION INTRAMUSCULAR; INTRAVENOUS PRN
Status: DISCONTINUED | OUTPATIENT
Start: 2018-03-12 | End: 2018-03-12

## 2018-03-12 RX ORDER — SODIUM CHLORIDE, SODIUM LACTATE, POTASSIUM CHLORIDE, CALCIUM CHLORIDE 600; 310; 30; 20 MG/100ML; MG/100ML; MG/100ML; MG/100ML
INJECTION, SOLUTION INTRAVENOUS CONTINUOUS
Status: DISCONTINUED | OUTPATIENT
Start: 2018-03-12 | End: 2018-03-13 | Stop reason: HOSPADM

## 2018-03-12 RX ORDER — FENTANYL CITRATE 50 UG/ML
INJECTION, SOLUTION INTRAMUSCULAR; INTRAVENOUS PRN
Status: DISCONTINUED | OUTPATIENT
Start: 2018-03-12 | End: 2018-03-12

## 2018-03-12 RX ORDER — SODIUM CHLORIDE, SODIUM LACTATE, POTASSIUM CHLORIDE, CALCIUM CHLORIDE 600; 310; 30; 20 MG/100ML; MG/100ML; MG/100ML; MG/100ML
500 INJECTION, SOLUTION INTRAVENOUS CONTINUOUS
Status: DISCONTINUED | OUTPATIENT
Start: 2018-03-12 | End: 2018-03-12 | Stop reason: HOSPADM

## 2018-03-12 RX ORDER — ONDANSETRON 2 MG/ML
4 INJECTION INTRAMUSCULAR; INTRAVENOUS EVERY 30 MIN PRN
Status: DISCONTINUED | OUTPATIENT
Start: 2018-03-12 | End: 2018-03-13 | Stop reason: HOSPADM

## 2018-03-12 RX ORDER — BUPIVACAINE HYDROCHLORIDE AND EPINEPHRINE 2.5; 5 MG/ML; UG/ML
INJECTION, SOLUTION INFILTRATION; PERINEURAL PRN
Status: DISCONTINUED | OUTPATIENT
Start: 2018-03-12 | End: 2018-03-12 | Stop reason: HOSPADM

## 2018-03-12 RX ORDER — HYDROCODONE BITARTRATE AND ACETAMINOPHEN 5; 325 MG/1; MG/1
1 TABLET ORAL ONCE
Status: DISCONTINUED | OUTPATIENT
Start: 2018-03-12 | End: 2018-03-13 | Stop reason: HOSPADM

## 2018-03-12 RX ORDER — KETOROLAC TROMETHAMINE 30 MG/ML
INJECTION, SOLUTION INTRAMUSCULAR; INTRAVENOUS PRN
Status: DISCONTINUED | OUTPATIENT
Start: 2018-03-12 | End: 2018-03-12

## 2018-03-12 RX ORDER — PROPOFOL 10 MG/ML
INJECTION, EMULSION INTRAVENOUS CONTINUOUS PRN
Status: DISCONTINUED | OUTPATIENT
Start: 2018-03-12 | End: 2018-03-12

## 2018-03-12 RX ORDER — NALOXONE HYDROCHLORIDE 0.4 MG/ML
.1-.4 INJECTION, SOLUTION INTRAMUSCULAR; INTRAVENOUS; SUBCUTANEOUS
Status: DISCONTINUED | OUTPATIENT
Start: 2018-03-12 | End: 2018-03-13 | Stop reason: HOSPADM

## 2018-03-12 RX ORDER — FENTANYL CITRATE 50 UG/ML
25-50 INJECTION, SOLUTION INTRAMUSCULAR; INTRAVENOUS EVERY 5 MIN PRN
Status: DISCONTINUED | OUTPATIENT
Start: 2018-03-12 | End: 2018-03-12 | Stop reason: HOSPADM

## 2018-03-12 RX ORDER — LIDOCAINE 40 MG/G
CREAM TOPICAL
Status: DISCONTINUED | OUTPATIENT
Start: 2018-03-12 | End: 2018-03-12 | Stop reason: HOSPADM

## 2018-03-12 RX ORDER — ONDANSETRON 4 MG/1
4 TABLET, ORALLY DISINTEGRATING ORAL EVERY 30 MIN PRN
Status: DISCONTINUED | OUTPATIENT
Start: 2018-03-12 | End: 2018-03-13 | Stop reason: HOSPADM

## 2018-03-12 RX ORDER — HYDROCODONE BITARTRATE AND ACETAMINOPHEN 5; 325 MG/1; MG/1
1-2 TABLET ORAL EVERY 6 HOURS PRN
Qty: 10 TABLET | Refills: 0 | Status: SHIPPED | OUTPATIENT
Start: 2018-03-12 | End: 2018-03-29

## 2018-03-12 RX ADMIN — SODIUM CHLORIDE, SODIUM LACTATE, POTASSIUM CHLORIDE, CALCIUM CHLORIDE 25 ML: 600; 310; 30; 20 INJECTION, SOLUTION INTRAVENOUS at 07:23

## 2018-03-12 RX ADMIN — FENTANYL CITRATE 50 MCG: 50 INJECTION, SOLUTION INTRAMUSCULAR; INTRAVENOUS at 08:28

## 2018-03-12 RX ADMIN — ACETAMINOPHEN 975 MG: 325 TABLET ORAL at 07:23

## 2018-03-12 RX ADMIN — FENTANYL CITRATE 25 MCG: 50 INJECTION, SOLUTION INTRAMUSCULAR; INTRAVENOUS at 08:43

## 2018-03-12 RX ADMIN — PROPOFOL 200 MCG/KG/MIN: 10 INJECTION, EMULSION INTRAVENOUS at 08:28

## 2018-03-12 RX ADMIN — BUPIVACAINE HYDROCHLORIDE AND EPINEPHRINE 1.5 ML: 2.5; 5 INJECTION, SOLUTION INFILTRATION; PERINEURAL at 09:03

## 2018-03-12 RX ADMIN — BUPIVACAINE HYDROCHLORIDE AND EPINEPHRINE 3 ML: 2.5; 5 INJECTION, SOLUTION INFILTRATION; PERINEURAL at 08:41

## 2018-03-12 RX ADMIN — DEXAMETHASONE SODIUM PHOSPHATE 4 MG: 4 INJECTION, SOLUTION INTRA-ARTICULAR; INTRALESIONAL; INTRAMUSCULAR; INTRAVENOUS; SOFT TISSUE at 08:38

## 2018-03-12 RX ADMIN — PROPOFOL 200 MG: 10 INJECTION, EMULSION INTRAVENOUS at 08:28

## 2018-03-12 RX ADMIN — KETOROLAC TROMETHAMINE 30 MG: 30 INJECTION, SOLUTION INTRAMUSCULAR; INTRAVENOUS at 09:03

## 2018-03-12 RX ADMIN — ONDANSETRON 4 MG: 2 INJECTION INTRAMUSCULAR; INTRAVENOUS at 08:59

## 2018-03-12 ASSESSMENT — LIFESTYLE VARIABLES: TOBACCO_USE: 1

## 2018-03-12 ASSESSMENT — ENCOUNTER SYMPTOMS: ORTHOPNEA: 0

## 2018-03-12 NOTE — PROGRESS NOTES
Pt urinated 200 cc pink/red tinged urine. Bladder scanned for 600 ml post void residual. Green catheter inserted per Dr. Dimas's verbal order. Instructions given to patient and family for at home care. Instructed pt to come into clinic on Thursday, March 15th to have catheter removed.

## 2018-03-12 NOTE — ANESTHESIA CARE TRANSFER NOTE
Patient: Slim Mcbride    Procedure(s):  Retropubic Midurethral Sling, Cystoscopy - Wound Class: II-Clean Contaminated    Diagnosis: Mixed Urinary Incontinence  Diagnosis Additional Information: No value filed.    Anesthesia Type:   General, LMA     Note:  Airway :Face Mask  Patient transferred to:PACU  Comments: 107/68 99%  97.9-69-12  Handoff Report: Identifed the Patient, Identified the Reponsible Provider, Reviewed the pertinent medical history, Discussed the surgical course, Reviewed Intra-OP anesthesia mangement and issues during anesthesia, Set expectations for post-procedure period and Allowed opportunity for questions and acknowledgement of understanding      Vitals: (Last set prior to Anesthesia Care Transfer)    CRNA VITALS  3/12/2018 0842 - 3/12/2018 0917      3/12/2018             Pulse: 73    SpO2: 98 %    Resp Rate (observed): (!)  4    Resp Rate (set): 10                Electronically Signed By: BINU Redman CRNA  March 12, 2018  9:17 AM

## 2018-03-12 NOTE — OP NOTE
March 12, 2018    Preoperative diagnosis: Mixed urinary incontinence    Postoperative diagnosis: Same    Procedure: Retropubic midurethral sling (TVT exact), cystoscopy    Surgeon: Milagro Dimas MD     Assistant: None    Anesthesia: General with LMA    EBL: 10 mL  UOP: not recorded  IVF: please see anesthesia record    Drains: 16 Fr dyson catheter to recovery room    Complications: None noted    Specimens: None    Findings: Cystoscopy with no foreign bodies or abnormalities, brisk bilateral efflux    Indication for procedure: Slim Mcbride is 62 year old with mixed urinary incontinence and a history of UTIs.  Her urgency is controlled with medications however the stress component was still persistent and bothersome despite pelvic floor therapy and a trial of an incontinence pessary.  Options were discussed and she elected to proceed with a minimally invasive durable procedure.  The procedure and the risks were discussed and patient elected to proceed with a retropubic midurethral sling    Summary of procedure:  After patient was identified in the pre-operative area and procedure verified, informed consent was obtained.  After this patient was taken to the operating suite and placed in the supine position.  Intravenous fredrick-operative antibiotics were administered by anesthesia.  After appropriate anesthesia was induced and the airway secured, patient was placed in the dorsal lithotomy position in supportive yellow-fin stirrups with careful attention to neural safety in positioning of all four extremities.  At this time patient was prepped and draped in the standard fashion.      A time out was performed to confirm patient and procedure.  A dyson catheter was placed which drained clear urine for the duration of the case    Lonestar and stays were used for retraction.  Patient was placed in trendelenburg and the skin sites were marked at the top of the pubic bone 5 cm apart.  Local anesthesia was injected into the  vagina and into the trocar tract sites.  A 2 cm incision in the midurethra was made.  The paraurethral tunnels were dissected to allow the trocars to be passed.      With the bladder drained we used of the mandarin catheter guide to distract the urethra away from the passage of the trocars. Both trocars were passed with care close to the back of the pubic bone to avoid anatomic structures in the inguinal area and lateral pelvic sidewall.. Once both trocars were passed paraurethrally penetrating the urogenital diaphragm with control with the index finger in the vagina under the vaginal wall with the needle tip in the frontal plane the tip.  Once the trocar was felt to have passed through the urogenital diaphragm the tip was immediately aimed towards the abdominal midline with lowering of the handle of the introducer to keep the trocar in close contact with the pubic bone the entire way.  Once the trocars had punctured thru the skin a cystoscopy was performed.      With a 70 degree lens a cystoscopy was performed which did not show any foreign body or other urothelial abnormality.  Bilateral ureteral orifices were noted in the normal orthotopic position and both effluxed clear yellow urine.  The urethra was unremarkable.    The catheter was then replaced, the bladder again drained and then the trocars were fully passed through the incision.  The sling was felt to be seated at the midurethral without any tension.  The excess mesh was excised and the suprapubic incisions were copiously irrigated.  The vagina was then closed in two layers with a 4-0 monocryl.  Pelvic exam afterwards revealed appropriate hemostasis and there was no abnormalities felt in the sling tunnel tract.      At this time the procedure was terminated.  Counts were reported as correct    Patient went to the recovery room in good condition    I spoke to her  in the waiting room at the end of the case    Plan: Retrograde voiding trial in the  recovery room.  Home today with follow up as an outpatient.    Milagro Dimas MD MPH   of Urology

## 2018-03-12 NOTE — IP AVS SNAPSHOT
MRN:[id number]                      After Visit Summary   3/12/2018    Slim Mcbride    MRN: [id number]           Thank you!     Thank you for choosing [facility] for your care. Our goal is always to provide you with excellent care. Hearing back from our patients is one way we can continue to improve our services. Please take a few minutes to complete the written survey that you may receive in the mail after you visit with us. Thank you!        Patient Information     Date Of Birth          1955        About your hospital stay     You were admitted on:  March 12, 2018 [facility]    You were discharged on:  March 12, 2018       Who to Call     For medical emergencies, please call 911.  For non-urgent questions about your medical care, please call your primary care provider or clinic, [phone]  For questions related to your surgery, please call your surgery clinic        Attending Provider     Provider Specialty    Milagro Dimas MD Urology       Primary Care Provider Office Phone # Fax #    Joe Cardenas [phone]      After Care Instructions     Diet Instructions       Resume pre procedure diet            Discharge Instructions       Patient to arrange for follow up appointment in 2 weeks and 6 weeks if not already scheduled            Encourage fluids       Encourage fluids at home to keep urine clear to light pink            Ice to affected area       Ice to operative site.  PRN as tolerated            No Alcohol       for 24 hours post procedure            No driving or operating machinery        until the day after procedure            No lifting over 15 pounds and no strenuous physical activity       for 6 weeks                  Your next 10 appointments already scheduled     Mar 29, 2018 11:15 AM CDT   (Arrive by 11:00 AM)   Post-Op with Milagro Dimas MD   [facility] ([facility])    [address] [address]   [phone]            Apr 26, 2018 11:00 AM CDT   (Arrive by 10:45 AM)   Post-Op with Milagro Dimas MD   [facility] ([facility])    [address] [place] [id number]   [phone]              Further instructions from your care team       [facility]  Home Care Following Anesthesia  For 24 hours after surgery:  1. Get plenty of rest.  A responsible adult must stay with you for at least 24 hours after you leave the surgery center.  2. Do not drive or use heavy equipment.  If you have weakness or tingling, don't drive or use heavy equipment until this feeling goes away.   3. Do not drink alcohol.   4. Avoid strenuous or risky activities.  Ask for help when climbing stairs.  5. You may feel lightheaded.  IF so, sit for a few minutes before standing.  Have someone help you get up.   6. If you have nausea (feel sick to your stomach): Drink only clear liquids such as apple juice, ginger ale, broth or 7-Up.  Rest may also help.  Be sure to drink enough fluids.  Move to a regular diet as you feel able.   7. You may have a slight fever.  Call the doctor if your fever is over 100 F (37.7 C) (taken under the tongue) or lasts longer than 24 hours.  8. You may have a dry mouth, a sore throat, muscle aches or trouble sleeping. These should go away after 24 hours.  9. Do not make important or legal decisions.               Tips for taking pain medications  To get the best pain relief possible, remember these points:    Take pain medications as directed, before pain becomes severe.    Pain medication can upset your stomach: taking it with food may help.    Constipation is a common side effect of pain medication. Drink plenty of  fluids.    Eat foods high in  fiber. Take a stool softener if recommended by your doctor or pharmacist.    Do not drink alcohol, drive or operate machinery while taking pain medications.    Ask about other ways to control pain, such as with heat, ice or relaxation.    Tylenol/Acetaminophen Consumption  To help encourage the safe use of acetaminophen, the makers of TYLENOL  have lowered the maximum daily dose for single-ingredient Extra Strength TYLENOL  (acetaminophen) products sold in the [place] from 8 pills per day (4,000 mg) to 6 pills per day (3,000 mg). The dosing interval has also changed from 2 pills every 4-6 hours to 2 pills every 6 hours.    If you feel your pain relief is insufficient, you may take Tylenol/Acetaminophen in addition to your narcotic pain medication.     Be careful not to exceed 3,000 mg of Tylenol/Acetaminophen in a 24 hour period from all sources.    If you are taking extra strength Tylenol/acetaminophen (500 mg), the maximum dose is 6 tablets in 24 hours.    If you are taking regular strength acetaminophen (325 mg), the maximum dose is 9 tablets in 24 hours.    Call a doctor for any of the followin. Signs of infection (fever, growing tenderness at the surgery site, a large amount of drainage or bleeding, severe pain, foul-smelling drainage, redness, swelling).  2. It has been over 8 to 10 hours since surgery and you are still not able to urinate (pass water).  3. Headache for over 24 hours.  Your doctor is:  Dr. Milagro Dimas, Prostate and Urology: [phone]  Or dial [phone] and ask for the resident on call for:  Prostate Urology  For emergency care, call the:  [facility]:  [phone] (TTY for hearing impaired: [phone])      Care of Indwelling Green Catheter  Cleanliness is VERY important!  1. Wash well around catheter with soap and water and rinse well.  Do this every morning and before bedtime.  2. Empty leg bag or bed bag into toilet whenever it becomes half full.  3. When  "disconnecting and reconnecting, wipe both the catheter end and tubing tip with alcohol. (You may use commercially prepared alcohol wipes or regular cotton balls soaked in alcohol.)  4. Rinse leg bag and bed bag inside and out after each use. You can soak leg bag and/or bed bag in two to three ounces of white vinegar when not in use. Rinse thoroughly before reconnecting to catheter.  5. You may clamp the catheter for short periods of time (two to three hours) if you are not uncomfortable.  6. Drink lots of fluids (at least eight to ten cups/glasses per day) and take two to four grams of Vitamin C (optional) per day.      7. Watch for sign of catheter-associated urinary tract infection which include:      Cloudy urine, sediment in urine (may look like sand particles or white  flakes), foul smelling urine    A burning feeling, pressure or pain in your lower abdomen    A burning feeling in the urethra or genitalia    Aching in the back (by the kidney)    At the time of discharge from the hospital, you have a 14 Green catheter with a 10 cc balloon. It was inserted on March 12, 2018 and should be removed in clinic on Thursday, March 15th. This appointment needs to be made by you (the patient).              Pending Results     No orders found from 3/10/2018 to 3/13/2018.            Admission Information     Date & Time Provider Department Dept. Phone    3/12/2018 Milagro Dimas MD [facility] [phone]      Your Vitals Were     Blood Pressure Pulse Temperature Respirations Height Weight    114/71 71 98.7  F (37.1  C) (Temporal) 14 1.651 m (5' 5\") 74.8 kg (165 lb)    Pulse Oximetry BMI (Body Mass Index)                95% 27.46 kg/m2          [employer] Information     [employer] gives you secure access to your electronic health record. If you see a primary care provider, you can also send messages to your care team and make appointments. If you have questions, please call your primary care " clinic.  If you do not have a primary care provider, please call [phone] and they will assist you.      [employer] is an electronic gateway that provides easy, online access to your medical records. With [employer], you can request a clinic appointment, read your test results, renew a prescription or communicate with your care team.     To access your existing account, please contact your [facility] or call [phone] for assistance.        Care EveryWhere ID     This is your Care EveryWhere ID. This could be used by other organizations to access your [place] medical records  [id number]        Equal Access to Services     [facility] ANN : Henry Beverly, corrie burns, dulce raymond, rosanne brock . So [facility] [phone].    ATENCIÓN: Si habla español, tiene a azevedo disposición servicios gratuitos de asistencia lingüística. Lida al [phone].    We comply with applicable federal civil rights laws and [place] laws. We do not discriminate on the basis of race, color, national origin, age, disability, sex, sexual orientation, or gender identity.               Review of your medicines      UNREVIEWED medicines. Ask your doctor about these medicines        Dose / Directions    ciprofloxacin 500 MG tablet   Commonly known as:  CIPRO   Used for:  Urinary tract infection   Ask about: Should I take this medication?        Dose:  500 mg   Take 1 tablet (500 mg) by mouth 2 times daily for 5 days   Quantity:  10 tablet   Refills:  0         START taking        Dose / Directions    HYDROcodone-acetaminophen 5-325 MG per tablet   Commonly known as:  NORCO   Used for:  Mixed incontinence, Postoperative pain        Dose:  1-2 tablet   Take 1-2 tablets by mouth every 6 hours as needed for moderate to severe pain (Moderate to Severe Pain)   Quantity:  10 tablet   Refills:  0         CONTINUE these medicines which have NOT CHANGED        Dose /  Directions    ACETAMINOPHEN PO        Dose:  1000 mg   Take 1,000 mg by mouth daily as needed for pain   Refills:  0       aspirin 81 MG tablet        Dose:  81 mg   Take 81 mg by mouth every evening   Refills:  0       calcium citrate-vitamin D 315-200 MG-UNIT Tabs per tablet   Commonly known as:  CITRACAL        Dose:  1 tablet   Take 1 tablet by mouth daily   Refills:  0       CENTRUM SILVER Chew        Dose:  1 tablet   Take 1 tablet by mouth daily   Refills:  0       conjugated estrogens cream   Commonly known as:  PREMARIN   Used for:  Vaginal dryness        Dose:  0.5 g   Place 0.5 g vaginally twice a week Begin with pea-size amount placed in vagina, nightly x 2 weeks. Then use 2x/week   Quantity:  30 g   Refills:  1       cyanocobalamin 1000 MCG Tabs        Dose:  1000 mcg   Take 1,000 mcg by mouth daily   Refills:  0       diphenhydrAMINE 50 MG capsule   Commonly known as:  BENADRYL        Dose:  50 mg   Take 50 mg by mouth nightly as needed   Refills:  0       fexofenadine 180 MG tablet   Commonly known as:  ALLEGRA        Dose:  180 mg   Take 180 mg by mouth daily   Refills:  0       FLUOXETINE HCL PO        Dose:  80 mg   Take 80 mg by mouth daily   Refills:  0       GABAPENTIN PO        Take by mouth 3 times daily   Refills:  0       ketorolac 10 MG tablet   Commonly known as:  TORADOL        Dose:  10 mg   Take 10 mg by mouth every 6 hours as needed for moderate pain   Refills:  0       MAGNESIUM OXIDE PO        Dose:  400 mg   Take 400 mg by mouth daily   Refills:  0       mirabegron 25 MG 24 hr tablet   Commonly known as:  MYRBETRIQ   Used for:  Overactive bladder        Dose:  25 mg   Take 1 tablet (25 mg) by mouth daily   Quantity:  30 tablet   Refills:  3       nortriptyline 10 MG capsule   Commonly known as:  PAMELOR        Dose:  20 mg   Take 20 mg by mouth   Refills:  0       trimethoprim 100 MG tablet   Commonly known as:  TRIMPEX   Used for:  Recurrent UTI        Dose:  100 mg   Take 1 tablet  (100 mg) by mouth daily   Quantity:  30 tablet   Refills:  3       trospium 60 MG Cp24 24 hr capsule   Commonly known as:  SANCTURA XR   Used for:  Mixed incontinence urge and stress (male)(female)        Dose:  60 mg   Take 1 capsule (60 mg) by mouth every morning   Quantity:  90 each   Refills:  3            Where to get your medicines      Some of these will need a paper prescription and others can be bought over the counter. Ask your nurse if you have questions.     Bring a paper prescription for each of these medications     HYDROcodone-acetaminophen 5-325 MG per tablet                Protect others around you: Learn how to safely use, store and throw away your medicines at www.disposemymeds.org.        Information about OPIOIDS     PRESCRIPTION OPIOIDS: WHAT YOU NEED TO KNOW    Prescription opioids can be used to help relieve moderate to severe pain and are often prescribed following a surgery or injury, or for certain health conditions. These medications can be an important part of treatment but also come with serious risks. It is important to work with your health care provider to make sure you are getting the safest, most effective care.    WHAT ARE THE RISKS AND SIDE EFFECTS OF OPIOID USE?  Prescription opioids carry serious risks of addiction and overdose, especially with prolonged use. An opioid overdose, often marked by slowed breathing can cause sudden death. The use of prescription opioids can have a number of side effects as well, even when taken as directed:      Tolerance - meaning you might need to take more of a medication for the same pain relief    Physical dependence - meaning you have symptoms of withdrawal when a medication is stopped    Increased sensitivity to pain    Constipation    Nausea, vomiting, and dry mouth    Sleepiness and dizziness    Confusion    Depression    Low levels of testosterone that can result in lower sex drive, energy, and strength    Itching and sweating    RISKS  ARE GREATER WITH:    History of drug misuse, substance use disorder, or overdose    Mental health conditions (such as depression or anxiety)    Sleep apnea    Older age (65 years or older)    Pregnancy    Avoid alcohol while taking prescription opioids.   Also, unless specifically advised by your health care provider, medications to avoid include:    Benzodiazepines (such as Xanax or Valium)    Muscle relaxants (such as Soma or Flexeril)    Hypnotics (such as Ambien or Lunesta)    Other prescription opioids    KNOW YOUR OPTIONS:  Talk to your health care provider about ways to manage your pain that do not involve prescription opioids. Some of these options may actually work better and have fewer risks and side effects:    Pain relievers such as acetaminophen, ibuprofen, and naproxen    Some medications that are also used for depression or seizures    Physical therapy and exercise    Cognitive behavioral therapy, a psychological, goal-directed approach, in which patients learn how to modify physical, behavioral, and emotional triggers of pain and stress    IF YOU ARE PRESCRIBED OPIOIDS FOR PAIN:    Never take opioids in greater amounts or more often than prescribed    Follow up with your primary health care provider and work together to create a plan on how to manage your pain.    Talk about ways to help manage your pain that do not involve prescription opioids    Talk about all concerns and side effects    Help prevent misuse and abuse    Never sell or share prescription opioids    Never use another person's prescription opioids    Store prescription opioids in a secure place and out of reach of others (this may include visitors, children, friends, and family)    Visit www.cdc.gov/drugoverdose to learn about risks of opioid abuse and overdose    If you believe you may be struggling with addiction, tell your health care provider and ask for guidance or call [facility] National Helpline at 1-340-595-HELP    LEARN MORE /  www.cdc.gov/drugoverdose/prescribing/guideline.html    Safely dispose of unused prescription opioids: Find your local drug take-back programs and more information about the importance of safe disposal at www.doseofreality.mn.gov             Medication List: This is a list of all your medications and when to take them. Check marks below indicate your daily home schedule. Keep this list as a reference.      Medications           Morning Afternoon Evening Bedtime As Needed    ACETAMINOPHEN PO   Take 1,000 mg by mouth daily as needed for pain   Last time this was given:  975 mg on 3/12/2018  7:23 AM                                aspirin 81 MG tablet   Take 81 mg by mouth every evening                                calcium citrate-vitamin D 315-200 MG-UNIT Tabs per tablet   Commonly known as:  CITRACAL   Take 1 tablet by mouth daily                                CENTRUM SILVER Chew   Take 1 tablet by mouth daily                                conjugated estrogens cream   Commonly known as:  PREMARIN   Place 0.5 g vaginally twice a week Begin with pea-size amount placed in vagina, nightly x 2 weeks. Then use 2x/week                                cyanocobalamin 1000 MCG Tabs   Take 1,000 mcg by mouth daily                                diphenhydrAMINE 50 MG capsule   Commonly known as:  BENADRYL   Take 50 mg by mouth nightly as needed                                fexofenadine 180 MG tablet   Commonly known as:  ALLEGRA   Take 180 mg by mouth daily                                FLUOXETINE HCL PO   Take 80 mg by mouth daily                                GABAPENTIN PO   Take by mouth 3 times daily                                HYDROcodone-acetaminophen 5-325 MG per tablet   Commonly known as:  NORCO   Take 1-2 tablets by mouth every 6 hours as needed for moderate to severe pain (Moderate to Severe Pain)                                ketorolac 10 MG tablet   Commonly known as:  TORADOL   Take 10 mg by mouth every 6  hours as needed for moderate pain                                MAGNESIUM OXIDE PO   Take 400 mg by mouth daily                                mirabegron 25 MG 24 hr tablet   Commonly known as:  MYRBETRIQ   Take 1 tablet (25 mg) by mouth daily                                nortriptyline 10 MG capsule   Commonly known as:  PAMELOR   Take 20 mg by mouth                                trimethoprim 100 MG tablet   Commonly known as:  TRIMPEX   Take 1 tablet (100 mg) by mouth daily                                trospium 60 MG Cp24 24 hr capsule   Commonly known as:  SANCTURA XR   Take 1 capsule (60 mg) by mouth every morning                                  ASK your doctor about these medications           Morning Afternoon Evening Bedtime As Needed    ciprofloxacin 500 MG tablet   Commonly known as:  CIPRO   Take 1 tablet (500 mg) by mouth 2 times daily for 5 days   Ask about: Should I take this medication?

## 2018-03-12 NOTE — ANESTHESIA PREPROCEDURE EVALUATION
Anesthesia Evaluation     . Pt has had prior anesthetic. Type: General and MAC    No history of anesthetic complications          ROS/MED HX    ENT/Pulmonary:     (+)tobacco use, Past use , . .   (-) recent URI   Neurologic:     (+)other neuro History of post-concussive syndrome.  Has associated headaches.      Cardiovascular:     (+) ----. Taking blood thinners Pt has received instructions: Instructions Given to patient: Patient instructed to hold ASA 81 mg in preparation for surgery.   . . . :. .      (-) MENJIVAR and orthopnea/PND   METS/Exercise Tolerance:  4 - Raking leaves, gardening   Hematologic:  - neg hematologic  ROS       Musculoskeletal:  - neg musculoskeletal ROS       GI/Hepatic:     (+) Other GI/Hepatic History of esophageal stricture requiring dilation.  History of gastric bypass surgery.        Renal/Genitourinary:     (+) Other Renal/ Genitourinary, Recurrent UTIs with stress urinary incontinence.        Endo:  - neg endo ROS       Psychiatric:     (+) psychiatric history depression      Infectious Disease:  - neg infectious disease ROS       Malignancy:      - no malignancy   Other:    - neg other ROS                 Physical Exam  Normal systems: dental    Airway   Mallampati: II  TM distance: >3 FB  Neck ROM: full    Dental   (+) caps    Cardiovascular   Rhythm and rate: regular and normal  (-) no peripheral edema and no murmur    Pulmonary    breath sounds clear to auscultation    Other findings: 2/28/18: WBC:  7.6; Hgb: 13.4; Hct: 42.9; Plt: 289  2/28/18: Na: 136; K: 4.8; Cl: 103; Glu: 80; BUN: 19; Cr: 1.09; Ca: 8.8             PAC Discussion and Assessment    ASA Classification: 2  Case is suitable for: ASC  Anesthetic techniques and relevant risks discussed: GA and GA with regional block for post-op pain control  Invasive monitoring and risk discussed: No  Types:   Possibility and Risk of blood transfusion discussed: No  NPO instructions given:   Additional anesthetic preparation and risks  discussed:   Needs early admission to pre-op area:   Other:     PAC Resident/NP Anesthesia Assessment:  Slim Mcbride is a 62 year old female scheduled to undergo Cystoscopy with Retropubic Midurethral Sling on 3/12/18 with Dr. Milagro Dimas.    She has the following specific operative considerations:   1.  Increased risk of postoperative nausea/vomiting: Recommend use of antiemetic agents in the perioperative period.    2.  History of esophageal strictures requiring dilation: Recommend that extra caution be exercised should a gastric tube need to be placed during the procedure.  3.  CBC, BMP, UA today.    Revised Cardiac Risk Index: 0.9% risk of major adverse cardiac event.  ASHLYN risk: Low  PONV risk score= 3.  (If > 2, anti-emetic intervention is recommended.)  Anesthesia considerations: Refer to PAC assessment in the anesthesia records.        Reviewed and Signed by PAC Mid-Level Provider/Resident  Mid-Level Provider/Resident: Katarina Ospina CNP  Date: 2/28/18  Time:     Attending Anesthesiologist Anesthesia Assessment:        Anesthesiologist:   Date:   Time:   Pass/Fail:   Disposition:     PAC Pharmacist Assessment:        Pharmacist:   Date:   Time:      Anesthesia Plan      History & Physical Review  History and physical reviewed and following examination; no interval change.    ASA Status:  2 .    NPO Status:  > 6 hours    Plan for General and LMA with Intravenous and Propofol induction. Maintenance will be TIVA.    PONV prophylaxis:  Ondansetron (or other 5HT-3) and Dexamethasone or Solumedrol       Postoperative Care  Postoperative pain management:  IV analgesics and Oral pain medications.      Consents  Anesthetic plan, risks, benefits and alternatives discussed with:  Patient..                  History and physical assessed; Patient examined.   Risks and alternatives presented and discussed. Patient and family agree. All questions answered.      Prashant Kidd MD  Staff Anesthesiologist  *[phone]

## 2018-03-12 NOTE — BRIEF OP NOTE
[facility] Brief Operative Note    Pre-operative diagnosis: Mixed Urinary Incontinence   Post-operative diagnosis Same   Procedure: Procedure(s):  Retropubic Midurethral Sling, Cystoscopy - Wound Class: II-Clean Contaminated   Surgeon: Milagro Dimas MD   Assistants(s): None   Estimated blood loss: Less than 10 ml    Specimens: None   Findings: Cystoscopy without foreign body.  Brisk bilateral efflux

## 2018-03-12 NOTE — IP AVS SNAPSHOT
[facility]    [address] [place] [address]    Phone:  [phone]    Fax:  [phone]                                       After Visit Summary   3/12/2018    Slim Mcbride    MRN: [id number]           After Visit Summary Signature Page     I have received my discharge instructions, and my questions have been answered. I have discussed any challenges I see with this plan with the nurse or doctor.    ..........................................................................................................................................  Patient/Patient Representative Signature      ..........................................................................................................................................  Patient Representative Print Name and Relationship to Patient    ..................................................               ................................................  Date                                            Time    ..........................................................................................................................................  Reviewed by Signature/Title    ...................................................              ..............................................  Date                                                            Time

## 2018-03-12 NOTE — ANESTHESIA POSTPROCEDURE EVALUATION
Patient: Slim Mcbride    Procedure(s):  Retropubic Midurethral Sling, Cystoscopy - Wound Class: II-Clean Contaminated    Diagnosis:Mixed Urinary Incontinence  Diagnosis Additional Information: No value filed.    Anesthesia Type:  General, LMA    Note:  Anesthesia Post Evaluation    Patient location during evaluation: PACU  Patient participation: Able to fully participate in evaluation  Level of consciousness: awake and alert  Pain management: adequate  Airway patency: patent  Cardiovascular status: acceptable  Respiratory status: acceptable  Hydration status: acceptable  PONV: none     Anesthetic complications: None          Last vitals:  Vitals:    03/12/18 0915 03/12/18 0930 03/12/18 0946   BP: 107/68 113/67 114/71   Pulse:      Resp: 12 8 14   Temp: 36.7  C (98  F) 36.7  C (98  F) 37.1  C (98.7  F)   SpO2: 97% 95%          Electronically Signed By: Prashant Kidd MD  March 12, 2018  2:06 PM

## 2018-03-12 NOTE — DISCHARGE INSTRUCTIONS
[facility]  Home Care Following Anesthesia  For 24 hours after surgery:  1. Get plenty of rest.  A responsible adult must stay with you for at least 24 hours after you leave the surgery center.  2. Do not drive or use heavy equipment.  If you have weakness or tingling, don't drive or use heavy equipment until this feeling goes away.   3. Do not drink alcohol.   4. Avoid strenuous or risky activities.  Ask for help when climbing stairs.  5. You may feel lightheaded.  IF so, sit for a few minutes before standing.  Have someone help you get up.   6. If you have nausea (feel sick to your stomach): Drink only clear liquids such as apple juice, ginger ale, broth or 7-Up.  Rest may also help.  Be sure to drink enough fluids.  Move to a regular diet as you feel able.   7. You may have a slight fever.  Call the doctor if your fever is over 100 F (37.7 C) (taken under the tongue) or lasts longer than 24 hours.  8. You may have a dry mouth, a sore throat, muscle aches or trouble sleeping. These should go away after 24 hours.  9. Do not make important or legal decisions.               Tips for taking pain medications  To get the best pain relief possible, remember these points:    Take pain medications as directed, before pain becomes severe.    Pain medication can upset your stomach: taking it with food may help.    Constipation is a common side effect of pain medication. Drink plenty of  fluids.    Eat foods high in fiber. Take a stool softener if recommended by your doctor or pharmacist.    Do not drink alcohol, drive or operate machinery while taking pain medications.    Ask about other ways to control pain, such as with heat, ice or relaxation.    Tylenol/Acetaminophen Consumption  To help encourage the safe use of acetaminophen, the makers of TYLENOL  have lowered the maximum daily dose for single-ingredient Extra Strength TYLENOL  (acetaminophen) products sold in the [place] from 8  pills per day (4,000 mg) to 6 pills per day (3,000 mg). The dosing interval has also changed from 2 pills every 4-6 hours to 2 pills every 6 hours.    If you feel your pain relief is insufficient, you may take Tylenol/Acetaminophen in addition to your narcotic pain medication.     Be careful not to exceed 3,000 mg of Tylenol/Acetaminophen in a 24 hour period from all sources.    If you are taking extra strength Tylenol/acetaminophen (500 mg), the maximum dose is 6 tablets in 24 hours.    If you are taking regular strength acetaminophen (325 mg), the maximum dose is 9 tablets in 24 hours.    Call a doctor for any of the followin. Signs of infection (fever, growing tenderness at the surgery site, a large amount of drainage or bleeding, severe pain, foul-smelling drainage, redness, swelling).  2. It has been over 8 to 10 hours since surgery and you are still not able to urinate (pass water).  3. Headache for over 24 hours.  Your doctor is:  Dr. Milagro Dimas, Prostate and Urology: [phone]  Or dial [phone] and ask for the resident on call for:  Prostate Urology  For emergency care, call the:  [facility]:  [phone] (TTY for hearing impaired: [phone])      Care of Indwelling Green Catheter  Cleanliness is VERY important!  1. Wash well around catheter with soap and water and rinse well.  Do this every morning and before bedtime.  2. Empty leg bag or bed bag into toilet whenever it becomes half full.  3. When disconnecting and reconnecting, wipe both the catheter end and tubing tip with alcohol. (You may use commercially prepared alcohol wipes or regular cotton balls soaked in alcohol.)  4. Rinse leg bag and bed bag inside and out after each use. You can soak leg bag and/or bed bag in two to three ounces of white vinegar when not in use. Rinse thoroughly before reconnecting to catheter.  5. You may clamp the catheter for short periods of time (two to three hours) if you are not  uncomfortable.  6. Drink lots of fluids (at least eight to ten cups/glasses per day) and take two to four grams of Vitamin C (optional) per day.      7. Watch for sign of catheter-associated urinary tract infection which include:      Cloudy urine, sediment in urine (may look like sand particles or white  flakes), foul smelling urine    A burning feeling, pressure or pain in your lower abdomen    A burning feeling in the urethra or genitalia    Aching in the back (by the kidney)    At the time of discharge from the hospital, you have a 14 Green catheter with a 10 cc balloon. It was inserted on March 12, 2018 and should be removed in clinic on Thursday, March 15th. This appointment needs to be made by you (the patient).

## 2018-03-12 NOTE — OR NURSING
300 cc's of Normal saline bladder instillation and catheter removed will evaluate for post-residual void in phase 2.  Hand-off to Maria L Bingham RN

## 2018-03-13 ENCOUNTER — CARE COORDINATION (OUTPATIENT)
Dept: UROLOGY | Facility: CLINIC | Age: 63
End: 2018-03-13

## 2018-03-13 NOTE — PROGRESS NOTES
Urology Postop Phone Note:    Ms. Slim Mcbride is a 62 year old female who underwent retropubic midurethral sling, cystoscopy on 3/12/18 with Dr. Dimas for a history of stress incontinence.  Her postoperative course was unremarkable and the patient was d/c to home on 3/12/18.    Fevers/chills: Patient denies any fever or chills.  Eating/drinking: Patient is able to eat and drink without any complaints.  Bowel habits: Patient reports having a normal bowel movement.  Urine output Green catheter Color yellow Clarity clear Odor none    Incisions: Patient denies any signs and symptoms of infection.  Pain: Patient reports pain as a 1 out of 10.  The pain is located incision site.  Narcotics: The patient has been taking norco for pain medication and is able to control the pain.    Follow up appointment scheduled on 3/29/18 at 1115 with Dr. Dimas.  She will come in for Tov 3/15/18       Informed the patient of the clinic triage nursing # ([phone] option 2) and urology resident on call # for after hours concerns.    Thanks,   Laura Yuan   Department of Urologic Surgery

## 2018-03-15 ENCOUNTER — OFFICE VISIT (OUTPATIENT)
Dept: UROLOGY | Facility: CLINIC | Age: 63
End: 2018-03-15
Payer: COMMERCIAL

## 2018-03-15 VITALS
HEART RATE: 77 BPM | SYSTOLIC BLOOD PRESSURE: 118 MMHG | DIASTOLIC BLOOD PRESSURE: 72 MMHG | HEIGHT: 65 IN | BODY MASS INDEX: 27.49 KG/M2 | WEIGHT: 165 LBS

## 2018-03-15 DIAGNOSIS — Z98.890 HISTORY OF SUBURETHRAL SLING PROCEDURE: ICD-10-CM

## 2018-03-15 DIAGNOSIS — N39.46 MIXED INCONTINENCE URGE AND STRESS (MALE)(FEMALE): Primary | ICD-10-CM

## 2018-03-15 ASSESSMENT — PAIN SCALES - GENERAL: PAINLEVEL: NO PAIN (0)

## 2018-03-15 NOTE — PATIENT INSTRUCTIONS
Miralax daily    Please make sure that you  something TODAY at the pharmacy to help you have a bowel movement.  Whatever laxative or suppository of your choice    Return to see us as scheduled    It was a pleasure meeting with you today.  Thank you for allowing me and my team the privilege of caring for you today.  YOU are the reason we are here, and I truly hope we provided you with the excellent service you deserve.  Please let us know if there is anything else we can do for you so that we can be sure you are leaving completely satisfied with your care experience.

## 2018-03-15 NOTE — MR AVS SNAPSHOT
After Visit Summary   3/15/2018    Slim Mcbride    MRN: [id number]           Patient Information     Date Of Birth          1955        Visit Information        Provider Department      3/15/2018 9:15 AM Milagro Dimas MD M [facility] and [facility]        Today's Diagnoses     Mixed incontinence urge and stress (male)(female)    -  1    History of suburethral sling procedure          Care Instructions    Miralax daily    Please make sure that you  something TODAY at the pharmacy to help you have a bowel movement.  Whatever laxative or suppository of your choice    Return to see us as scheduled    It was a pleasure meeting with you today.  Thank you for allowing me and my team the privilege of caring for you today.  YOU are the reason we are here, and I truly hope we provided you with the excellent service you deserve.  Please let us know if there is anything else we can do for you so that we can be sure you are leaving completely satisfied with your care experience.            Follow-ups after your visit        Your next 10 appointments already scheduled     Mar 29, 2018 11:15 AM CDT   (Arrive by 11:00 AM)   Post-Op with MD MIRANDA Arroyo [facility] and [facility] ([facility])    [address] [place] [id number] [phone]            Apr 26, 2018 11:00 AM CDT   (Arrive by 10:45 AM)   Post-Op with MD MIRANDA Arroyo [facility] and [facility] ([facility])    [address] [place] [address] [phone]              Who to contact     Please call your clinic at [phone] to:    Ask questions about your health    Make or cancel appointments    Discuss your medicines    Learn about your test results    Speak to your doctor            Additional Information  "About Your Visit        Cellyhart Information     Gotuit gives you secure access to your electronic health record. If you see a primary care provider, you can also send messages to your care team and make appointments. If you have questions, please call your primary care clinic.  If you do not have a primary care provider, please call [phone] and they will assist you.      Gotuit is an electronic gateway that provides easy, online access to your medical records. With Got[employer], you can request a clinic appointment, read your test results, renew a prescription or communicate with your care team.     To access your existing account, please contact your [facility] or call [phone] for assistance.        Care EveryWhere ID     This is your Care EveryWhere ID. This could be used by other organizations to access your [facility] medical records  [id number]        Your Vitals Were     Pulse Height BMI (Body Mass Index)             77 1.651 m (5' 5\") 27.46 kg/m2          Blood Pressure from Last 3 Encounters:   03/15/18 118/72   03/12/18 114/71   03/08/18 112/71    Weight from Last 3 Encounters:   03/15/18 74.8 kg (165 lb)   03/12/18 74.8 kg (165 lb)   03/08/18 74.8 kg (165 lb)              We Performed the Following     POST-VOID RESIDUAL BLADDER SCAN        Primary Care Provider Office Phone # Fax #    Joe Cardenas [phone]       [address] [address]        Equal Access to Services     FRANCES PATEL : Hadii aad ku hadasho Soomaali, waaxda luqadaha, qaybta kaalmada adeegyada, rosanne brock . So [facility] [place] [phone].    ATENCIÓN: Si loganla eduardo, tiene a azevedo disposición servicios gratuitos de asistencia lingüística. Llame al [phone].    We comply with applicable federal civil rights laws and [place] laws. We do not discriminate on the basis of race, color, national origin, age, disability, sex, " sexual orientation, or gender identity.            Thank you!     Thank you for choosing [facility] AND [facility] FOR PROSTATE AND UROLOGIC CANCERS  for your care. Our goal is always to provide you with excellent care. Hearing back from our patients is one way we can continue to improve our services. Please take a few minutes to complete the written survey that you may receive in the mail after your visit with us. Thank you!             Your Updated Medication List - Protect others around you: Learn how to safely use, store and throw away your medicines at www.disposemymeds.org.          This list is accurate as of 3/15/18 10:10 AM.  Always use your most recent med list.                   Brand Name Dispense Instructions for use Diagnosis    ACETAMINOPHEN PO      Take 1,000 mg by mouth daily as needed for pain        aspirin 81 MG tablet      Take 81 mg by mouth every evening        calcium citrate-vitamin D 315-200 MG-UNIT Tabs per tablet    CITRACAL     Take 1 tablet by mouth daily        CENTRUM SILVER Chew      Take 1 tablet by mouth daily        conjugated estrogens cream    PREMARIN    30 g    Place 0.5 g vaginally twice a week Begin with pea-size amount placed in vagina, nightly x 2 weeks. Then use 2x/week    Vaginal dryness       cyanocobalamin 1000 MCG Tabs      Take 1,000 mcg by mouth daily        diphenhydrAMINE 50 MG capsule    BENADRYL     Take 50 mg by mouth nightly as needed        fexofenadine 180 MG tablet    ALLEGRA     Take 180 mg by mouth daily        FLUOXETINE HCL PO      Take 80 mg by mouth daily        GABAPENTIN PO      Take by mouth 3 times daily        HYDROcodone-acetaminophen 5-325 MG per tablet    NORCO    10 tablet    Take 1-2 tablets by mouth every 6 hours as needed for moderate to severe pain (Moderate to Severe Pain)    Mixed incontinence, Postoperative pain       ketorolac 10 MG tablet    TORADOL     Take 10 mg by mouth every 6 hours as needed for moderate pain        MAGNESIUM  OXIDE PO      Take 400 mg by mouth daily        mirabegron 25 MG 24 hr tablet    MYRBETRIQ    30 tablet    Take 1 tablet (25 mg) by mouth daily    Overactive bladder       nortriptyline 10 MG capsule    PAMELOR     Take 20 mg by mouth        trimethoprim 100 MG tablet    TRIMPEX    30 tablet    Take 1 tablet (100 mg) by mouth daily    Recurrent UTI       trospium 60 MG Cp24 24 hr capsule    SANCTURA XR    90 each    Take 1 capsule (60 mg) by mouth every morning    Mixed incontinence urge and stress (male)(female)

## 2018-03-15 NOTE — NURSING NOTE
Patient presents to the clinic today for a trial of void, catheter removal.  Patient is Slim Mcbride    Order by:     Patient said she was on a antibiotic       Approx 180 mL of sterile water instilled into the bladder via catheter.  14 Swiss indwelling urethral Catheter then removed with out difficulty  10mL water removed from balloon, balloon intact  Patient voided approx 210mL of clear urine.   Post-void residual was: 57mL per bladder scan.  Patient tolerated procedure well.        Marcia Love MA

## 2018-03-15 NOTE — PROGRESS NOTES
"March 15, 2018    Return visit    Patient returns today for post-op visit following midurethral sling placement (TVT) on 3/12/18. She denies any changes in her health since last visit. She failed voiding trial following her surgery and was discharged home with a catheter; she now returns today for trial of void. She is doing well other than constipation; she has not had a bowel movement since surgery but has not yet tried anything for this. She has no other complaints today.     Ht 1.651 m (5' 5\")  Wt 74.8 kg (165 lb)  BMI 27.46 kg/m2  She is comfortable, in no distress, non-labored breathing.  Abdomen is soft, non-tender, non-distended. Nicely healing suprapubic incisions with dermabond in place.    PVR 52 mL by bladder scan    A/P: 62 year old F POD3 s/p midurethral sling placement. TOV passed in clinic today.    -Continue postoperative restrictions  -Follow up in 2 weeks as scheduled for exam  -She will try milk of magnesia today as well as adding daily miralax to help with constipation    Addendum:    The patient was seen and evaluated with the resident.  The plan was formulated in conjunction with me and I agree with the above note with changes made as necessary.    CC  Patient Care Team:  Joe Cardenas MD as PCP - General (Family Practice)  Kelsey Kenyon NP as MD (Nurse Practitioner)  Markie Dimas MD as MD (Urology)  Laura Yuan, RN as Registered Nurse (Urology)  Joe Cardenas MD as Referring Physician (Family Practice)  MARKIE DIMAS              "

## 2018-03-15 NOTE — LETTER
"3/15/2018       RE: Slim Mcbride  [address] [facility] [address]     Dear Colleague,    Thank you for referring your patient, Slim Mcbride, to the [facility]OLOGY AND INST FOR PROSTATE AND UROLOGIC CANCERS at [facility]. Please see a copy of my visit note below.    March 15, 2018    Return visit    Patient returns today for post-op visit following midurethral sling placement (TVT) on 3/12/18. She denies any changes in her health since last visit. She failed voiding trial following her surgery and was discharged home with a catheter; she now returns today for trial of void. She is doing well other than constipation; she has not had a bowel movement since surgery but has not yet tried anything for this. She has no other complaints today.     Ht 1.651 m (5' 5\")  Wt 74.8 kg (165 lb)  BMI 27.46 kg/m2  She is comfortable, in no distress, non-labored breathing.  Abdomen is soft, non-tender, non-distended. Nicely healing suprapubic incisions with dermabond in place.    PVR 52 mL by bladder scan    A/P: 62 year old F POD3 s/p midurethral sling placement. TOV passed in clinic today.    -Continue postoperative restrictions  -Follow up in 2 weeks as scheduled for exam  -She will try milk of magnesia today as well as adding daily miralax to help with constipation    Addendum:    The patient was seen and evaluated with the resident.  The plan was formulated in conjunction with me and I agree with the above note with changes made as necessary.    Again, thank you for allowing me to participate in the care of your patient.      Sincerely,    Markie Dimas MD      CC  Patient Care Team:  Joe Cardenas MD as PCP - General (Family Practice)  Kelsey Kenyon NP as MD (Nurse Practitioner)  Markie Dimas MD as MD (Urology)  Laura Yuan RN as Registered Nurse (Urology)  Joe Cardenas MD as Referring Physician (Family Practice)  MARKIE DIMAS SEE " ALLEGRA

## 2018-03-20 ENCOUNTER — PRE VISIT (OUTPATIENT)
Dept: UROLOGY | Facility: CLINIC | Age: 63
End: 2018-03-20

## 2018-03-20 NOTE — TELEPHONE ENCOUNTER
Reason for visit: post op appointment     Relevant information: this is her second appointment    Records/imaging/labs: All records available    Pt called: No need for a call    Rooming: Regular

## 2018-03-26 ENCOUNTER — TELEPHONE (OUTPATIENT)
Dept: UROLOGY | Facility: CLINIC | Age: 63
End: 2018-03-26

## 2018-03-26 NOTE — TELEPHONE ENCOUNTER
Papers completed, Laura will have Dr. Dimas to sign.. cdk  Papers faxed and sent to scanning.. cdk

## 2018-03-29 ENCOUNTER — OFFICE VISIT (OUTPATIENT)
Dept: UROLOGY | Facility: CLINIC | Age: 63
End: 2018-03-29
Payer: COMMERCIAL

## 2018-03-29 VITALS
SYSTOLIC BLOOD PRESSURE: 121 MMHG | HEIGHT: 65 IN | HEART RATE: 62 BPM | WEIGHT: 165 LBS | DIASTOLIC BLOOD PRESSURE: 64 MMHG | BODY MASS INDEX: 27.49 KG/M2

## 2018-03-29 DIAGNOSIS — Z98.890 HISTORY OF SUBURETHRAL SLING PROCEDURE: Primary | ICD-10-CM

## 2018-03-29 DIAGNOSIS — N39.46 MIXED INCONTINENCE URGE AND STRESS (MALE)(FEMALE): ICD-10-CM

## 2018-03-29 ASSESSMENT — PAIN SCALES - GENERAL: PAINLEVEL: NO PAIN (0)

## 2018-03-29 NOTE — LETTER
"3/29/2018       RE: Slim Mcbride  [address] [facility] [address]     Dear Colleague,    Thank you for referring your patient, Slim Mcbride, to the [facility] UROLOGY AND INST FOR PROSTATE AND UROLOGIC CANCERS at [facility]. Please see a copy of my visit note below.    March 29, 2018    Postopoerative visit    Patient returns today for follow up s/p TVT, cystoscopy.  Having still some leakage especially with walking, less than prior.  Denies pain, hematuria, voiding dysfunction or vaginal bleeding She denies any changes in her health since last visit.    /62  Pulse 62  Ht 1.651 m (5' 5\")  Wt 74.8 kg (165 lb)  BMI 27.46 kg/m2  She is comfortable, in no distress, non-labored breathing.  Abdomen is soft, non-tender, non-distended.  Normal external female genitalia.  Negative CST.  Speculum and bimanual exam are unremarkable.  Suture line is healing well.    PVR 42 mL by bladder scan    A/P: 62 year old F with mixed urinary incontinence s/p TVT doing well    Will monitor her persistent small volume leakage at this time given that she is only 2 weeks postop    RTC 4 weeks, sooner if needed    Milagro Dimas MD MPH   of Urology      CC  Patient Care Team:  Joe Cardenas MD as PCP - General (Family Practice)  Kelsey Kenyon NP as MD (Nurse Practitioner)  Milagro Dimas MD as MD (Urology)  Laura Yuan RN as Registered Nurse (Urology)  Joe Cardenas MD as Referring Physician (Family Practice)  JOE CARDENAS    "

## 2018-03-29 NOTE — PROGRESS NOTES
"March 29, 2018    Postopoerative visit    Patient returns today for follow up s/p TVT, cystoscopy.  Having still some leakage especially with walking, less than prior.  Denies pain, hematuria, voiding dysfunction or vaginal bleeding She denies any changes in her health since last visit.    /62  Pulse 62  Ht 1.651 m (5' 5\")  Wt 74.8 kg (165 lb)  BMI 27.46 kg/m2  She is comfortable, in no distress, non-labored breathing.  Abdomen is soft, non-tender, non-distended.  Normal external female genitalia.  Negative CST.  Speculum and bimanual exam are unremarkable.  Suture line is healing well.    PVR 42 mL by bladder scan    A/P: 62 year old F with mixed urinary incontinence s/p TVT doing well    Will monitor her persistent small volume leakage at this time given that she is only 2 weeks postop    RTC 4 weeks, sooner if needed    Milagro Dimas MD MPH   of Urology    CC  Patient Care Team:  Joe Cardenas MD as PCP - General (Family Practice)  Kelsey Kenyon NP as MD (Nurse Practitioner)  Milagro Dimas MD as MD (Urology)  Laura Yuan, RN as Registered Nurse (Urology)  Joe Cardenas MD as Referring Physician (Family Practice)  JOE CARDENAS                "

## 2018-03-29 NOTE — MR AVS SNAPSHOT
After Visit Summary   3/29/2018    Slim Mcbride    MRN: [id number]           Patient Information     Date Of Birth          1955        Visit Information        Provider Department      3/29/2018 11:15 AM Milagro Dimas MD [facility] Urology and [facility] for Prostate and Urologic Cancers        Today's Diagnoses     History of suburethral sling procedure    -  1    Mixed incontinence urge and stress (male)(female)          Care Instructions    Return to clinic as scheduled          Follow-ups after your visit        Your next 10 appointments already scheduled     Apr 26, 2018 11:00 AM CDT   (Arrive by 10:45 AM)   Post-Op with Milagro Dimas MD   [facility] Urology and [facility] for [facility]state and Urologic Cancers ([facility])    [address] [place] [address] [phone]              Who to contact     Please call your clinic at [phone] to:    Ask questions about your health    Make or cancel appointments    Discuss your medicines    Learn about your test results    Speak to your doctor            Additional Information About Your Visit        Bukupehart Information     Winshuttle gives you secure access to your electronic health record. If you see a primary care provider, you can also send messages to your care team and make appointments. If you have questions, please call your primary care clinic.  If you do not have a primary care provider, please call [phone] and they will assist you.      Winshuttle is an electronic gateway that provides easy, online access to your medical records. With Winshuttle, you can request a clinic appointment, read your test results, renew a prescription or communicate with your care team.     To access your existing account, please contact your [facility] or call [phone] for assistance.        Care EveryWhere ID     This is your Care EveryWhere ID. This could be used by other  "organizations to access your [place] medical records  [id number]        Your Vitals Were     Pulse Height BMI (Body Mass Index)             62 1.651 m (5' 5\") 27.46 kg/m2          Blood Pressure from Last 3 Encounters:   03/29/18 121/64   03/15/18 118/72   03/12/18 114/71    Weight from Last 3 Encounters:   03/29/18 74.8 kg (165 lb)   03/15/18 74.8 kg (165 lb)   03/12/18 74.8 kg (165 lb)              We Performed the Following     POST-VOID RESIDUAL BLADDER SCAN          Today's Medication Changes          These changes are accurate as of 3/29/18 11:59 PM.  If you have any questions, ask your nurse or doctor.               Stop taking these medicines if you haven't already. Please contact your care team if you have questions.     conjugated estrogens cream   Commonly known as:  PREMARIN   Stopped by:  Milagro Dimas MD           HYDROcodone-acetaminophen 5-325 MG per tablet   Commonly known as:  NORCO   Stopped by:  Milagro Dimas MD                    Primary Care Provider Office Phone # Fax #    Joe Cardenas [phone]       [place] [address]        Equal Access to Services     FRANCES PATEL AH: Hadii magui ku hadasho Soomaali, waaxda luqadaha, qaybta kaalmada adeegyada, waxay eranin haytellon jaret montague. So [facility] [phone].    ATENCIÓN: Si habla español, tiene a azevedo disposición servicios gratFort [facility]iance Indian Hospitalos de asistencia lingüística. Llame al [phone].    We comply with applicable federal civil rights laws and [place] laws. We do not discriminate on the basis of race, color, national origin, age, disability, sex, sexual orientation, or gender identity.            Thank you!     Thank you for choosing [facility] UROLOGY AND [facility] FOR PROSTATE AND UROLOGIC CANCERS  for your care. Our goal is always to provide you with excellent care. Hearing back from our patients is one way we can continue to improve our services. Please take a few " minutes to complete the written survey that you may receive in the mail after your visit with us. Thank you!             Your Updated Medication List - Protect others around you: Learn how to safely use, store and throw away your medicines at www.disposemymeds.org.          This list is accurate as of 3/29/18 11:59 PM.  Always use your most recent med list.                   Brand Name Dispense Instructions for use Diagnosis    ACETAMINOPHEN PO      Take 1,000 mg by mouth daily as needed for pain        aspirin 81 MG tablet      Take 81 mg by mouth every evening        calcium citrate-vitamin D 315-200 MG-UNIT Tabs per tablet    CITRACAL     Take 1 tablet by mouth daily        CENTRUM SILVER Chew      Take 1 tablet by mouth daily        cyanocobalamin 1000 MCG Tabs      Take 1,000 mcg by mouth daily        diphenhydrAMINE 50 MG capsule    BENADRYL     Take 50 mg by mouth nightly as needed        fexofenadine 180 MG tablet    ALLEGRA     Take 180 mg by mouth daily        FLUOXETINE HCL PO      Take 80 mg by mouth daily        GABAPENTIN PO      Take by mouth 3 times daily        ketorolac 10 MG tablet    TORADOL     Take 10 mg by mouth every 6 hours as needed for moderate pain        MAGNESIUM OXIDE PO      Take 400 mg by mouth daily        mirabegron 25 MG 24 hr tablet    MYRBETRIQ    30 tablet    Take 1 tablet (25 mg) by mouth daily    Overactive bladder       nortriptyline 10 MG capsule    PAMELOR     Take 20 mg by mouth        trimethoprim 100 MG tablet    TRIMPEX    30 tablet    Take 1 tablet (100 mg) by mouth daily    Recurrent UTI       trospium 60 MG Cp24 24 hr capsule    SANCTURA XR    90 each    Take 1 capsule (60 mg) by mouth every morning    Mixed incontinence urge and stress (male)(female)

## 2018-04-04 DIAGNOSIS — N32.81 OVERACTIVE BLADDER: ICD-10-CM

## 2018-04-05 RX ORDER — MIRABEGRON 25 MG/1
25 TABLET, FILM COATED, EXTENDED RELEASE ORAL DAILY
Qty: 90 TABLET | Refills: 3 | Status: SHIPPED | OUTPATIENT
Start: 2018-04-05 | End: 2018-09-13

## 2018-04-23 ENCOUNTER — PRE VISIT (OUTPATIENT)
Dept: UROLOGY | Facility: CLINIC | Age: 63
End: 2018-04-23

## 2018-04-23 NOTE — TELEPHONE ENCOUNTER
Reason for visit: post op     Relevant information: Pt had some incontinence at two weeks post op    Records/imaging/labs: All records available    Pt called: No need for a call    Rooming: PVR

## 2018-04-26 ENCOUNTER — OFFICE VISIT (OUTPATIENT)
Dept: UROLOGY | Facility: CLINIC | Age: 63
End: 2018-04-26
Payer: COMMERCIAL

## 2018-04-26 VITALS
HEART RATE: 76 BPM | WEIGHT: 165 LBS | DIASTOLIC BLOOD PRESSURE: 67 MMHG | SYSTOLIC BLOOD PRESSURE: 122 MMHG | HEIGHT: 65 IN | BODY MASS INDEX: 27.49 KG/M2

## 2018-04-26 DIAGNOSIS — N39.46 MIXED INCONTINENCE: Primary | ICD-10-CM

## 2018-04-26 DIAGNOSIS — Z98.890 HISTORY OF SUBURETHRAL SLING PROCEDURE: ICD-10-CM

## 2018-04-26 ASSESSMENT — PAIN SCALES - GENERAL: PAINLEVEL: NO PAIN (0)

## 2018-04-26 NOTE — PATIENT INSTRUCTIONS
You are released from your activity restrictions    Please return to see us in 6 months, sooner if needed    It was a pleasure meeting with you today.  Thank you for allowing me and my team the privilege of caring for you today.  YOU are the reason we are here, and I truly hope we provided you with the excellent service you deserve.  Please let us know if there is anything else we can do for you so that we can be sure you are leaving completely satisfied with your care experience.

## 2018-04-26 NOTE — LETTER
"4/26/2018     RE: Slim Mcbride  [address] [facility] [address]     Dear Colleague,    Thank you for referring your patient, Slim Mcbride, to the [facility] UROLOGY AND INST FOR PROSTATE AND UROLOGIC CANCERS at [facility]. Please see a copy of my visit note below.    April 26, 2018    Return visit    Patient returns today for follow up.  Reports 80% better.   Still on the mirabegron.  She denies any changes in her health since last visit.    /67  Pulse 76  Ht 1.651 m (5' 5\")  Wt 74.8 kg (165 lb)  BMI 27.46 kg/m2  She is comfortable, in no distress, non-labored breathing.  Abdomen is soft, non-tender, non-distended.  Normal external female genitalia.  Negative CST.  Speculum and bimanual exam are unremarkable.    PVR 0 mL by bladder scan    A/P: 62 year old F with mixed incontinence, 6 weeks s/p TVT    At this time patient is overall happy with her symptom control and wishes to monitor    Released from activity restrictions    RTC 6 months, sooner if needed    15 minutes were spent with the patient today, > 50% in counseling and coordination of care    Milagro Dimas MD MPH   of Urology    CC  Patient Care Team:  Joe Cardenas MD as PCP - General (Family Practice)  Kelsey Kenyon NP as MD (Nurse Practitioner)  Milagro Dimas MD as MD (Urology)  Laura Yuan RN as Registered Nurse (Urology)  Joe Cardenas MD as Referring Physician (Family Practice)  JOE CARDENAS    "

## 2018-04-26 NOTE — PROGRESS NOTES
"April 26, 2018    Return visit    Patient returns today for follow up.  Reports 80% better.   Still on the mirabegron.  She denies any changes in her health since last visit.    /67  Pulse 76  Ht 1.651 m (5' 5\")  Wt 74.8 kg (165 lb)  BMI 27.46 kg/m2  She is comfortable, in no distress, non-labored breathing.  Abdomen is soft, non-tender, non-distended.  Normal external female genitalia.  Negative CST.  Speculum and bimanual exam are unremarkable.    PVR 0 mL by bladder scan    A/P: 62 year old F with mixed incontinence, 6 weeks s/p TVT    At this time patient is overall happy with her symptom control and wishes to monitor    Released from activity restrictions    RTC 6 months, sooner if needed    15 minutes were spent with the patient today, > 50% in counseling and coordination of care    Milagro Dimas MD MPH   of Urology    CC  Patient Care Team:  Joe Cardenas MD as PCP - General (Family Practice)  Kelsey Kenyon NP as MD (Nurse Practitioner)  Milagro Dimas MD as MD (Urology)  Laura Yuan, RN as Registered Nurse (Urology)  Joe Cardenas MD as Referring Physician (Family Practice)  JOE CARDENAS              "

## 2018-04-26 NOTE — MR AVS SNAPSHOT
After Visit Summary   4/26/2018    Slim Mcbride    MRN: [id number]           Patient Information     Date Of Birth          1955        Visit Information        Provider Department      4/26/2018 11:00 AM Milagro Dimas MD [facility] Urology and [facility] for Prostate and Urologic Cancers        Today's Diagnoses     Mixed incontinence    -  1    History of suburethral sling procedure          Care Instructions    You are released from your activity restrictions    Please return to see us in 6 months, sooner if needed    It was a pleasure meeting with you today.  Thank you for allowing me and my team the privilege of caring for you today.  YOU are the reason we are here, and I truly hope we provided you with the excellent service you deserve.  Please let us know if there is anything else we can do for you so that we can be sure you are leaving completely satisfied with your care experience.            Follow-ups after your visit        Follow-up notes from your care team     Return in about 6 months (around 10/26/2018).      Your next 10 appointments already scheduled     Oct 25, 2018 12:00 PM CDT   (Arrive by 11:45 AM)   Return Visit with Milagro Dimas MD   [facility] Urology and [facility] for Prostate and Urologic Cancers ([facility])    [address] [place] [address] [phone]              Who to contact     Please call your clinic at [phone] to:    Ask questions about your health    Make or cancel appointments    Discuss your medicines    Learn about your test results    Speak to your doctor            Additional Information About Your Visit        MyChart Information     DossierViewt gives you secure access to your electronic health record. If you see a primary care provider, you can also send messages to your care team and make appointments. If you have questions, please call your primary care clinic.  If you do not have a primary  "care provider, please call [phone] and they will assist you.      Eved is an electronic gateway that provides easy, online access to your medical records. With Eved, you can request a clinic appointment, read your test results, renew a prescription or communicate with your care team.     To access your existing account, please contact your [facility] or call [phone] for assistance.        Care EveryWhere ID     This is your Care EveryWhere ID. This could be used by other organizations to access your [place] medical records  [id number]        Your Vitals Were     Pulse Height BMI (Body Mass Index)             76 1.651 m (5' 5\") 27.46 kg/m2          Blood Pressure from Last 3 Encounters:   04/26/18 122/67   03/29/18 121/64   03/15/18 118/72    Weight from Last 3 Encounters:   04/26/18 74.8 kg (165 lb)   03/29/18 74.8 kg (165 lb)   03/15/18 74.8 kg (165 lb)              Today, you had the following     No orders found for display       Primary Care Provider Office Phone # Fax #    Joe Cardenas [phone]       [address] [address]        Equal Access to Services     FRANCES PATEL : Hadii aad ku hadasho Soomaali, waaxda luqadaha, qaybta kaalmada adeegyada, waxay idiin haytellon jaret pickett laerrol montague. So [facility] [phone].    ATENCIÓN: Si habla español, tiene a azevedo disposición servicios gratuitos de asistencia lingüística. Llame al [phone].    We comply with applicable federal civil rights laws and [place] laws. We do not discriminate on the basis of race, color, national origin, age, disability, sex, sexual orientation, or gender identity.            Thank you!     Thank you for choosing [facility] UROLOGY AND [facility] FOR PROSTATE AND UROLOGIC CANCERS  for your care. Our goal is always to provide you with excellent care. Hearing back from our patients is one way we can continue to improve our services. Please " take a few minutes to complete the written survey that you may receive in the mail after your visit with us. Thank you!             Your Updated Medication List - Protect others around you: Learn how to safely use, store and throw away your medicines at www.disposemymeds.org.          This list is accurate as of 4/26/18 11:38 AM.  Always use your most recent med list.                   Brand Name Dispense Instructions for use Diagnosis    ACETAMINOPHEN PO      Take 1,000 mg by mouth daily as needed for pain        aspirin 81 MG tablet      Take 81 mg by mouth every evening        calcium citrate-vitamin D 315-200 MG-UNIT Tabs per tablet    CITRACAL     Take 1 tablet by mouth daily        CENTRUM SILVER Chew      Take 1 tablet by mouth daily        cyanocobalamin 1000 MCG Tabs      Take 1,000 mcg by mouth daily        diphenhydrAMINE 50 MG capsule    BENADRYL     Take 50 mg by mouth nightly as needed        fexofenadine 180 MG tablet    ALLEGRA     Take 180 mg by mouth daily        FLUOXETINE HCL PO      Take 80 mg by mouth daily        GABAPENTIN PO      Take by mouth 3 times daily        ketorolac 10 MG tablet    TORADOL     Take 10 mg by mouth every 6 hours as needed for moderate pain        MAGNESIUM OXIDE PO      Take 400 mg by mouth daily        mirabegron 25 MG 24 hr tablet    MYRBETRIQ    90 tablet    Take 1 tablet (25 mg) by mouth daily    Overactive bladder       nortriptyline 10 MG capsule    PAMELOR     Take 20 mg by mouth        trimethoprim 100 MG tablet    TRIMPEX    30 tablet    Take 1 tablet (100 mg) by mouth daily    Recurrent UTI       trospium 60 MG Cp24 24 hr capsule    SANCTURA XR    90 each    Take 1 capsule (60 mg) by mouth every morning    Mixed incontinence urge and stress (male)(female)

## 2018-04-26 NOTE — NURSING NOTE
"Chief Complaint   Patient presents with     RECHECK     Post op - still having some incontinence       Blood pressure 122/67, pulse 76, height 1.651 m (5' 5\"), weight 74.8 kg (165 lb), not currently breastfeeding. Body mass index is 27.46 kg/(m^2).    Patient Active Problem List   Diagnosis     CARDIOVASCULAR SCREENING; LDL GOAL LESS THAN 160     Mixed incontinence urge and stress (male)(female)     Somatic dysfunction of pelvis region     Allergic rhinitis     B12 deficiency     Recurrent ventral incisional hernia       Allergies   Allergen Reactions     Nkda [No Known Drug Allergies]        Current Outpatient Prescriptions   Medication Sig Dispense Refill     ACETAMINOPHEN PO Take 1,000 mg by mouth daily as needed for pain       aspirin 81 MG tablet Take 81 mg by mouth every evening        calcium citrate-vitamin D (CITRACAL) 315-200 MG-UNIT TABS per tablet Take 1 tablet by mouth daily        cyanocobalamin 1000 MCG TABS Take 1,000 mcg by mouth daily        diphenhydrAMINE (BENADRYL) 50 MG capsule Take 50 mg by mouth nightly as needed        fexofenadine (ALLEGRA) 180 MG tablet Take 180 mg by mouth daily        FLUOXETINE HCL PO Take 80 mg by mouth daily       GABAPENTIN PO Take by mouth 3 times daily       ketorolac (TORADOL) 10 MG tablet Take 10 mg by mouth every 6 hours as needed for moderate pain       MAGNESIUM OXIDE PO Take 400 mg by mouth daily       mirabegron (MYRBETRIQ) 25 MG 24 hr tablet Take 1 tablet (25 mg) by mouth daily 90 tablet 3     Multiple Vitamins-Minerals (CENTRUM SILVER) CHEW Take 1 tablet by mouth daily        nortriptyline (PAMELOR) 10 MG capsule Take 20 mg by mouth       trimethoprim (TRIMPEX) 100 MG tablet Take 1 tablet (100 mg) by mouth daily 30 tablet 3     trospium (SANCTURA XR) 60 MG CP24 24 hr capsule Take 1 capsule (60 mg) by mouth every morning 90 each 3       Social History   Substance Use Topics     Smoking status: Former Smoker     Packs/day: 1.00     Years: 8.00     Types: " Cigarettes     Quit date: 1/2/1978     Smokeless tobacco: Never Used     Alcohol use Yes      Comment: 2-3 drinks one to two times a month.         MITESH Mclaughlin  4/26/2018  11:02 AM   .

## 2018-05-04 ENCOUNTER — TELEPHONE (OUTPATIENT)
Dept: UROLOGY | Facility: CLINIC | Age: 63
End: 2018-05-04

## 2018-05-04 DIAGNOSIS — R35.0 URINARY FREQUENCY: Primary | ICD-10-CM

## 2018-05-04 NOTE — TELEPHONE ENCOUNTER
Urinary frequency with small amounts of urine when she urinate  No fever, chills, nausea or vomiting.  Cloudy urine with no hematuria.    Plan:  UA/UC     Patient verbalized understanding. No further questions.       Diana Wheat, RN, BSN  Urology Patient Care Supervisor

## 2018-05-04 NOTE — TELEPHONE ENCOUNTER
[facility]    Phone Message    May a detailed message be left on voicemail: yes    Reason for Call: Other: Pt is requesting a lab order for a UTI be placed. She would also like a call back once it's placed so she can set it up.     Action Taken: Message routed to:  [facility] Urology

## 2018-05-09 DIAGNOSIS — R35.0 URINARY FREQUENCY: ICD-10-CM

## 2018-05-09 LAB
ALBUMIN UR-MCNC: NEGATIVE MG/DL
APPEARANCE UR: ABNORMAL
BACTERIA #/AREA URNS HPF: ABNORMAL /HPF
BILIRUB UR QL STRIP: NEGATIVE
COLOR UR AUTO: YELLOW
GLUCOSE UR STRIP-MCNC: NEGATIVE MG/DL
HGB UR QL STRIP: NEGATIVE
KETONES UR STRIP-MCNC: NEGATIVE MG/DL
LEUKOCYTE ESTERASE UR QL STRIP: ABNORMAL
NITRATE UR QL: POSITIVE
PH UR STRIP: 6 PH (ref 5–7)
RBC #/AREA URNS AUTO: ABNORMAL /HPF
SOURCE: ABNORMAL
SP GR UR STRIP: 1.02 (ref 1–1.03)
UROBILINOGEN UR STRIP-ACNC: 0.2 EU/DL (ref 0.2–1)
WBC #/AREA URNS AUTO: ABNORMAL /HPF

## 2018-05-09 PROCEDURE — 87088 URINE BACTERIA CULTURE: CPT | Performed by: UROLOGY

## 2018-05-09 PROCEDURE — 87186 SC STD MICRODIL/AGAR DIL: CPT | Performed by: UROLOGY

## 2018-05-09 PROCEDURE — 81001 URINALYSIS AUTO W/SCOPE: CPT | Performed by: UROLOGY

## 2018-05-09 PROCEDURE — 87086 URINE CULTURE/COLONY COUNT: CPT | Performed by: UROLOGY

## 2018-05-12 LAB
BACTERIA SPEC CULT: ABNORMAL
SPECIMEN SOURCE: ABNORMAL

## 2018-05-15 ENCOUNTER — TELEPHONE (OUTPATIENT)
Dept: UROLOGY | Facility: CLINIC | Age: 63
End: 2018-05-15

## 2018-05-15 NOTE — TELEPHONE ENCOUNTER
[facility]    Phone Message    May a detailed message be left on voicemail: yes    Reason for Call: Other: PT called re: MyChart message she received for updates.  She would like her Rx sent to [facility] [address] and PT stated she has no allergies.       Action Taken: Message routed to:  [facility] Urology

## 2018-05-15 NOTE — TELEPHONE ENCOUNTER
[facility]    Phone Message    May a detailed message be left on voicemail: yes    Reason for Call: Other: PT called re: mycmarisolt message and stated she would like her Rx sent to [place] [facility] and that she has no allergies.     Action Taken: Message routed to:  [facility] Urology

## 2018-05-16 ENCOUNTER — TELEPHONE (OUTPATIENT)
Dept: UROLOGY | Facility: CLINIC | Age: 63
End: 2018-05-16

## 2018-05-16 DIAGNOSIS — N39.0 URINARY TRACT INFECTION: Primary | ICD-10-CM

## 2018-05-16 RX ORDER — CEPHALEXIN 500 MG/1
500 CAPSULE ORAL 2 TIMES DAILY
Qty: 10 CAPSULE | Refills: 0 | Status: SHIPPED | OUTPATIENT
Start: 2018-05-16 | End: 2018-09-13

## 2018-05-16 NOTE — TELEPHONE ENCOUNTER
[facility]    Phone Message    May a detailed message be left on voicemail: yes    Reason for Call: Medication Refill Request    Has the patient contacted the pharmacy for the refill? Yes   Name of medication being requested: Antibiotic for UTI, she couldn't remember the name   Provider who prescribed the medication: Dr. Dimas  Pharmacy: [facility] in [place], [place]   Date medication is needed: As soon as possible      Action Taken: Message routed to:  [facility] Urology

## 2018-06-23 ENCOUNTER — HEALTH MAINTENANCE LETTER (OUTPATIENT)
Age: 63
End: 2018-06-23

## 2018-06-29 DIAGNOSIS — N39.0 URINARY TRACT INFECTION: Primary | ICD-10-CM

## 2018-06-30 ENCOUNTER — OFFICE VISIT (OUTPATIENT)
Dept: URGENT CARE | Facility: URGENT CARE | Age: 63
End: 2018-06-30
Payer: COMMERCIAL

## 2018-06-30 VITALS
RESPIRATION RATE: 16 BRPM | WEIGHT: 169 LBS | SYSTOLIC BLOOD PRESSURE: 112 MMHG | DIASTOLIC BLOOD PRESSURE: 64 MMHG | OXYGEN SATURATION: 97 % | HEART RATE: 83 BPM | BODY MASS INDEX: 28.12 KG/M2 | TEMPERATURE: 97.9 F

## 2018-06-30 DIAGNOSIS — R30.0 DYSURIA: ICD-10-CM

## 2018-06-30 DIAGNOSIS — N30.00 ACUTE CYSTITIS WITHOUT HEMATURIA: Primary | ICD-10-CM

## 2018-06-30 DIAGNOSIS — N39.0 URINARY TRACT INFECTION: ICD-10-CM

## 2018-06-30 LAB
ALBUMIN UR-MCNC: NEGATIVE MG/DL
APPEARANCE UR: CLEAR
BACTERIA #/AREA URNS HPF: ABNORMAL /HPF
BILIRUB UR QL STRIP: NEGATIVE
COLOR UR AUTO: YELLOW
GLUCOSE UR STRIP-MCNC: NEGATIVE MG/DL
HGB UR QL STRIP: NEGATIVE
KETONES UR STRIP-MCNC: NEGATIVE MG/DL
LEUKOCYTE ESTERASE UR QL STRIP: ABNORMAL
NITRATE UR QL: POSITIVE
NON-SQ EPI CELLS #/AREA URNS LPF: ABNORMAL /LPF
PH UR STRIP: 6 PH (ref 5–7)
RBC #/AREA URNS AUTO: ABNORMAL /HPF
SOURCE: ABNORMAL
SP GR UR STRIP: 1.02 (ref 1–1.03)
UROBILINOGEN UR STRIP-ACNC: 0.2 EU/DL (ref 0.2–1)
WBC #/AREA URNS AUTO: ABNORMAL /HPF

## 2018-06-30 PROCEDURE — 87186 SC STD MICRODIL/AGAR DIL: CPT | Performed by: UROLOGY

## 2018-06-30 PROCEDURE — 81001 URINALYSIS AUTO W/SCOPE: CPT | Performed by: UROLOGY

## 2018-06-30 PROCEDURE — 99213 OFFICE O/P EST LOW 20 MIN: CPT | Performed by: FAMILY MEDICINE

## 2018-06-30 PROCEDURE — 87086 URINE CULTURE/COLONY COUNT: CPT | Performed by: UROLOGY

## 2018-06-30 PROCEDURE — 87088 URINE BACTERIA CULTURE: CPT | Performed by: UROLOGY

## 2018-06-30 RX ORDER — CIPROFLOXACIN 500 MG/1
500 TABLET, FILM COATED ORAL 2 TIMES DAILY
Qty: 14 TABLET | Refills: 0 | Status: SHIPPED | OUTPATIENT
Start: 2018-06-30 | End: 2018-09-13

## 2018-06-30 ASSESSMENT — PAIN SCALES - GENERAL: PAINLEVEL: NO PAIN (0)

## 2018-06-30 NOTE — MR AVS SNAPSHOT
"              After Visit Summary   6/30/2018    Slim Mcbride    MRN: [id number]           Patient Information     Date Of Birth          1955        Visit Information        Provider Department      6/30/2018 9:30 AM Kodi Andrews MD [facility]        Today's Diagnoses     Dysuria    -  1    Acute cystitis without hematuria          Care Instructions       * BLADDER INFECTION,Female (Adult)    A bladder infection (\"cystitis\" or \"UTI\") usually causes a constant urge to urinate and a burning when passing urine. Urine may be cloudy, smelly or dark. There may be pain in the lower abdomen. A bladder infection occurs when bacteria from the vaginal area enter the bladder opening (urethra). This can occur from sexual intercourse, wearing tight clothing, dehydration and other factors.  HOME CARE:  1. Drink lots of fluids (at least 6-8 glasses a day, unless you must restrict fluids for other medical reasons). This will force the medicine into your urinary system and flush the bacteria out of your body. Cranberry juice has been shown to help clear out the bacteria.  2. Avoid sexual intercourse until your symptoms are gone.  3. A bladder infection is treated with antibiotics. You may also be given Pyridium (generic = phenazopyridine) to reduce the burning sensation. This medicine will cause your urine to become a bright orange color. The orange urine may stain clothing. You may wear a pad or panty-liner to protect clothing.  PREVENTING FUTURE INFECTIONS:  1. Always wipe from front to back after a bowel movement.  2. Keep the genital area clean and dry.  3. Drink plenty of fluids each day to avoid dehydration.  4. Urinate right after intercourse to flush out the bladder.  5. Wear cotton underwear and cotton-lined panty hose; avoid tight-fitting pants.  6. If you are on birth control pills and are having frequent bladder infections, discuss with your doctor.  FOLLOW UP: Return to this facility or see your " doctor if ALL symptoms are not gone after three days of treatment.  GET PROMPT MEDICAL ATTENTION if any of the following occur:    Fever over 101 F (38.3 C)    No improvement by the third day of treatment    Increasing back or abdominal pain    Repeated vomiting; unable to keep medicine down    Weakness, dizziness or fainting    Vaginal discharge    Pain, redness or swelling in the labia (outer vaginal area)    2345-5841 [employer]. [address] [place] [address], [place] [address]. All rights reserved. This information is not intended as a substitute for professional medical care. Always follow your healthcare professional's instructions.  This information has been modified by your health care provider with permission from the publisher.    Ciprofloxacin tablets  Brand Name: Cipro  What is this medicine?  CIPROFLOXACIN (sip milan FLOX a sin) is a quinolone antibiotic. It is used to treat certain kinds of bacterial infections. It will not work for colds, flu, or other viral infections.  How should I use this medicine?  Take this medicine by mouth with a glass of water. Follow the directions on the prescription label. Take your medicine at regular intervals. Do not take your medicine more often than directed. Take all of your medicine as directed even if you think your are better. Do not skip doses or stop your medicine early.  You can take this medicine with food or on an empty stomach. It can be taken with a meal that contains dairy or calcium, but do not take it alone with a dairy product, like milk or yogurt or calcium-fortified juice.  A special MedGuide will be given to you by the pharmacist with each prescription and refill. Be sure to read this information carefully each time.  Talk to your pediatrician regarding the use of this medicine in children. Special care may be needed.  What side effects may I notice from receiving this medicine?  Side effects that you should report to your doctor or health care  professional as soon as possible:    allergic reactions like skin rash or hives, swelling of the face, lips, or tongue    anxious    confusion    depressed mood    diarrhea    fast, irregular heartbeat    hallucination, loss of contact with reality    joint, muscle, or tendon pain or swelling    pain, tingling, numbness in the hands or feet    suicidal thoughts or other mood changes    sunburn    unusually weak or tired  Side effects that usually do not require medical attention (report to your doctor or health care professional if they continue or are bothersome):    dry mouth    headache    nausea    trouble sleeping  What may interact with this medicine?  Do not take this medicine with any of the following medications:    cisapride    dofetilide    dronedarone    flibanserin    lomitapide    pimozide    thioridazine    tizanidine    ziprasidone  This medicine may also interact with the following medications:    antacids    birth control pills    caffeine    certain medicines for diabetes, like glipizide or glyburide    certain medicines that treat or prevent blood clots like warfarin    clozapine    cyclosporine    didanosine (ddI) buffered tablets or powder    duloxetine    lanthanum carbonate    lidocaine    methotrexate    multivitamins    NSAIDS, medicines for pain and inflammation, like ibuprofen or naproxen    olanzapine    omeprazole    other medicines that prolong the QT interval (cause an abnormal heart rhythm)    phenytoin    probenecid    ropinirole    sevelamer    sildenafil    sucralfate    theophylline    zolpidem  What if I miss a dose?  If you miss a dose, take it as soon as you can. If it is almost time for your next dose, take only that dose. Do not take double or extra doses.  Where should I keep my medicine?  Keep out of the reach of children.  Store at room temperature below 30 degrees C (86 degrees F). Keep container tightly closed. Throw away any unused medicine after the expiration  date.  What should I tell my health care provider before I take this medicine?  They need to know if you have any of these conditions:    bone problems    history of low levels of potassium in the blood    joint problems    irregular heartbeat    kidney disease    myasthenia gravis    seizures    tendon problems    tingling of the fingers or toes, or other nerve disorder    an unusual or allergic reaction to ciprofloxacin, other antibiotics or medicines, foods, dyes, or preservatives    pregnant or trying to get pregnant    breast-feeding  What should I watch for while using this medicine?  Tell your doctor or health care professional if your symptoms do not improve.  Do not treat diarrhea with over the counter products. Contact your doctor if you have diarrhea that lasts more than 2 days or if it is severe and watery.  You may get drowsy or dizzy. Do not drive, use machinery, or do anything that needs mental alertness until you know how this medicine affects you. Do not stand or sit up quickly, especially if you are an older patient. This reduces the risk of dizzy or fainting spells.  This medicine can make you more sensitive to the sun. Keep out of the sun. If you cannot avoid being in the sun, wear protective clothing and use sunscreen. Do not use sun lamps or tanning beds/booths.  Avoid antacids, aluminum, calcium, iron, magnesium, and zinc products for 6 hours before and 2 hours after taking a dose of this medicine.  NOTE:This sheet is a summary. It may not cover all possible information. If you have questions about this medicine, talk to your doctor, pharmacist, or health care provider. Copyright  2018 [employer]                Follow-ups after your visit        Your next 10 appointments already scheduled     Oct 25, 2018 12:00 PM CDT   (Arrive by 11:45 AM)   Return Visit with Milagro Dimas MD   [facility]rologic [facility] ([facility])    [address] [address] [phone]              Who to contact     If you have questions or need follow up information about today's clinic visit or your schedule please contact [facility] directly at [phone].  Normal or non-critical lab and imaging results will be communicated to you by MyChart, letter or phone within 4 business days after the clinic has received the results. If you do not hear from us within 7 days, please contact the clinic through imageloophart or phone. If you have a critical or abnormal lab result, we will notify you by phone as soon as possible.  Submit refill requests through [employer] or call your pharmacy and they will forward the refill request to us. Please allow 3 business days for your refill to be completed.          Additional Information About Your Visit        MyChart Information     [employer] gives you secure access to your electronic health record. If you see a primary care provider, you can also send messages to your care team and make appointments. If you have questions, please call your primary care clinic.  If you do not have a primary care provider, please call [phone] and they will assist you.        Care EveryWhere ID     This is your Care EveryWhere ID. This could be used by other organizations to access your [facility] medical records  [id number]        Your Vitals Were     Pulse Temperature Respirations Pulse Oximetry Breastfeeding? BMI (Body Mass Index)    83 97.9  F (36.6  C) (Oral) 16 97% No 28.12 kg/m2       Blood Pressure from Last 3 Encounters:   06/30/18 112/64   04/26/18 122/67   03/29/18 121/64    Weight from Last 3 Encounters:   06/30/18 169 lb (76.7 kg)   04/26/18 165 lb (74.8 kg)   03/29/18 165 lb (74.8 kg)              Today, you had the following     No orders found for display         Today's Medication Changes          These changes are accurate as of 6/30/18  9:50 AM.  If you have any questions, ask your  nurse or doctor.               Start taking these medicines.        Dose/Directions    ciprofloxacin 500 MG tablet   Commonly known as:  CIPRO   Used for:  Acute cystitis without hematuria   Started by:  Kodi Andrews MD        Dose:  500 mg   Take 1 tablet (500 mg) by mouth 2 times daily   Quantity:  14 tablet   Refills:  0            Where to get your medicines      These medications were sent to [facility] [place], [place] - [address], [address]  [address], [address], [facility] [address]     Phone:  [phone]     ciprofloxacin 500 MG tablet                Primary Care Provider Office Phone # Fax #    Joe Cardenas [phone]       [facility] [id number] [place] [id number]        Equal Access to Services     FRANCES PATEL : Hadii aad ku hadasho Soomaali, waaxda luqadaha, qaybta kaalmada adeegyada, waxnato brock . So [facility] [phone].    ATENCIÓN: Si habla español, tiene a azevedo disposición servicios gratuitos de asistencia lingüística. [facility] [phone].    We comply with applicable federal civil rights laws and [place] laws. We do not discriminate on the basis of race, color, national origin, age, disability, sex, sexual orientation, or gender identity.            Thank you!     Thank you for choosing [facility] [place]  for your care. Our goal is always to provide you with excellent care. Hearing back from our patients is one way we can continue to improve our services. Please take a few minutes to complete the written survey that you may receive in the mail after your visit with us. Thank you!             Your Updated Medication List - Protect others around you: Learn how to safely use, store and throw away your medicines at www.disposemymeds.org.          This list is accurate as of 6/30/18  9:50 AM.  Always use your most recent med list.                   Brand Name Dispense Instructions for use  Diagnosis    ACETAMINOPHEN PO      Take 1,000 mg by mouth daily as needed for pain        aspirin 81 MG tablet      Take 81 mg by mouth every evening        calcium citrate-vitamin D 315-200 MG-UNIT Tabs per tablet    CITRACAL     Take 1 tablet by mouth daily        CENTRUM SILVER Chew      Take 1 tablet by mouth daily        cephALEXin 500 MG capsule    KEFLEX    10 capsule    Take 1 capsule (500 mg) by mouth 2 times daily    Urinary tract infection       ciprofloxacin 500 MG tablet    CIPRO    14 tablet    Take 1 tablet (500 mg) by mouth 2 times daily    Acute cystitis without hematuria       cyanocobalamin 1000 MCG Tabs      Take 1,000 mcg by mouth daily        diphenhydrAMINE 50 MG capsule    BENADRYL     Take 50 mg by mouth nightly as needed        fexofenadine 180 MG tablet    ALLEGRA     Take 180 mg by mouth daily        FLUOXETINE HCL PO      Take 80 mg by mouth daily        GABAPENTIN PO      Take by mouth 3 times daily        ketorolac 10 MG tablet    TORADOL     Take 10 mg by mouth every 6 hours as needed for moderate pain        MAGNESIUM OXIDE PO      Take 400 mg by mouth daily        mirabegron 25 MG 24 hr tablet    MYRBETRIQ    90 tablet    Take 1 tablet (25 mg) by mouth daily    Overactive bladder       nortriptyline 10 MG capsule    PAMELOR     Take 20 mg by mouth        trimethoprim 100 MG tablet    TRIMPEX    30 tablet    Take 1 tablet (100 mg) by mouth daily    Recurrent UTI       trospium 60 MG Cp24 24 hr capsule    SANCTURA XR    90 each    Take 1 capsule (60 mg) by mouth every morning    Mixed incontinence urge and stress (male)(female)

## 2018-06-30 NOTE — PROGRESS NOTES
SUBJECTIVE:   Slim Mcbride is a 63 year old female who presents to clinic today for the following health issues:    Chief Complaint   Patient presents with     Urinary Problem     pt c/o frequency, urgency and odor x 3 days       Duration: 3 days     Description (location/character/radiation): urinary frequency, urgency, odor    Intensity:  moderate    Accompanying signs and symptoms: no fever, chills, flank pain or other relevant relevant systemic symptoms     History (similar episodes/previous evaluation): h/o UTI's, had bladder sling in 02/2018    Precipitating or alleviating factors: None    Therapies tried and outcome: OTC analgesia        Problem list and histories reviewed & adjusted, as indicated.  Additional history: as documented    Patient Active Problem List   Diagnosis     CARDIOVASCULAR SCREENING; LDL GOAL LESS THAN 160     Mixed incontinence urge and stress (male)(female)     Somatic dysfunction of pelvis region     Allergic rhinitis     B12 deficiency     Recurrent ventral incisional hernia     Past Surgical History:   Procedure Laterality Date     ANKLE SURGERY      Repair of injury after a sledding accident.     ARTHROSCOPY KNEE Right      BREAST BIOPSY, RT/LT      Results negative     CYSTOSCOPY, SLING TRANSVAGINAL N/A 3/12/2018    Procedure: CYSTOSCOPY, SLING TRANSVAGINAL;  Retropubic Midurethral Sling, Cystoscopy;  Surgeon: Milagro Dimas MD;  Location: [facility] OR     GASTRIC BYPASS  05/2002     HC ESOPHAGOSCOPY, DIAGNOSTIC      Estimated 6 times.     HERNIA REPAIR, INCISIONAL      In 3/2004 and 9/2015     LAPAROTOMY EXPLORATORY  10/2011    Repair of perforated bowel     ROTATOR CUFF REPAIR RT/LT Right 2000     ROTATOR CUFF REPAIR RT/LT Left 2002     TONSILLECTOMY       TUBAL LIGATION  1985       Social History   Substance Use Topics     Smoking status: Former Smoker     Packs/day: 1.00     Years: 8.00     Types: Cigarettes     Quit date: 1/2/1978     Smokeless tobacco: Never Used      Alcohol use Yes      Comment: 2-3 drinks one to two times a month.       Family History   Problem Relation Age of Onset     Diabetes Father      Cardiovascular Father      Pacemaker     Breast Cancer Maternal Grandmother      Myocardial Infarction Paternal Grandmother      HEART DISEASE Paternal Grandfather      Myocardial Infarction Paternal Grandfather      Cerebrovascular Disease Brother      Thyroid Disease Daughter      Cerebrovascular Disease Sister 30     Multiple     Lung Cancer Sister      Other - See Comments Sister      Fibromyalgia     Glaucoma Mother      LUNG DISEASE Mother      Lung nodules     Osteoarthritis Mother      Brain Tumor Sister      No Known Problems Brother      Other - See Comments Brother       at age 1 year.     No Known Problems Sister          Current Outpatient Prescriptions   Medication Sig Dispense Refill     ACETAMINOPHEN PO Take 1,000 mg by mouth daily as needed for pain       aspirin 81 MG tablet Take 81 mg by mouth every evening        calcium citrate-vitamin D (CITRACAL) 315-200 MG-UNIT TABS per tablet Take 1 tablet by mouth daily        cephALEXin (KEFLEX) 500 MG capsule Take 1 capsule (500 mg) by mouth 2 times daily 10 capsule 0     cyanocobalamin 1000 MCG TABS Take 1,000 mcg by mouth daily        diphenhydrAMINE (BENADRYL) 50 MG capsule Take 50 mg by mouth nightly as needed        fexofenadine (ALLEGRA) 180 MG tablet Take 180 mg by mouth daily        FLUOXETINE HCL PO Take 80 mg by mouth daily       GABAPENTIN PO Take by mouth 3 times daily       ketorolac (TORADOL) 10 MG tablet Take 10 mg by mouth every 6 hours as needed for moderate pain       MAGNESIUM OXIDE PO Take 400 mg by mouth daily       mirabegron (MYRBETRIQ) 25 MG 24 hr tablet Take 1 tablet (25 mg) by mouth daily 90 tablet 3     Multiple Vitamins-Minerals (CENTRUM SILVER) CHEW Take 1 tablet by mouth daily        trimethoprim (TRIMPEX) 100 MG tablet Take 1 tablet (100 mg) by mouth daily 30 tablet 3      trospium (SANCTURA XR) 60 MG CP24 24 hr capsule Take 1 capsule (60 mg) by mouth every morning 90 each 3     nortriptyline (PAMELOR) 10 MG capsule Take 20 mg by mouth       Allergies   Allergen Reactions     Nkda [No Known Drug Allergies]      Recent Labs   Lab Test  02/28/18   1534  03/16/17   1028   09/02/10   1151   LDL   --    --    --   117   HDL   --    --    --   68   TRIG   --    --    --   78   CR  1.09*  0.84   --    --    GFRESTIMATED  50*  68   < >   --    GFRESTBLACK  61  83   < >   --    POTASSIUM  4.8  4.8   --    --    TSH   --    --    --   1.96    < > = values in this interval not displayed.      BP Readings from Last 3 Encounters:   06/30/18 112/64   04/26/18 122/67   03/29/18 121/64    Wt Readings from Last 3 Encounters:   06/30/18 169 lb (76.7 kg)   04/26/18 165 lb (74.8 kg)   03/29/18 165 lb (74.8 kg)                  Labs reviewed in EPIC    Reviewed and updated as needed this visit by clinical staff  Tobacco  Allergies  Meds  Med Hx  Surg Hx  Fam Hx  Soc Hx      Reviewed and updated as needed this visit by Provider         ROS:  Constitutional, HEENT, cardiovascular, pulmonary, gi and gu systems are negative, except as otherwise noted.    OBJECTIVE:     /64  Pulse 83  Temp 97.9  F (36.6  C) (Oral)  Resp 16  Wt 169 lb (76.7 kg)  SpO2 97%  Breastfeeding? No  BMI 28.12 kg/m2  Body mass index is 28.12 kg/(m^2).  GENERAL: alert and no distress  NECK: no adenopathy, no asymmetry, masses, or scars and thyroid normal to palpation  RESP: lungs clear to auscultation - no rales, rhonchi or wheezes  CV: regular rate and rhythm, normal S1 S2, no S3 or S4, no murmur, click or rub, no peripheral edema and peripheral pulses strong  ABDOMEN: soft, nontender, no hepatosplenomegaly, no masses and bowel sounds normal  MS: no gross musculoskeletal defects noted, no edema  NEURO: Normal strength and tone, mentation intact and speech normal    Diagnostic Test Results:  Results for orders placed or  "performed in visit on 06/30/18 (from the past 24 hour(s))   UA reflex to Microscopic   Result Value Ref Range    Color Urine Yellow     Appearance Urine Clear     Glucose Urine Negative NEG^Negative mg/dL    Bilirubin Urine Negative NEG^Negative    Ketones Urine Negative NEG^Negative mg/dL    Specific Gravity Urine 1.020 1.003 - 1.035    Blood Urine Negative NEG^Negative    pH Urine 6.0 5.0 - 7.0 pH    Protein Albumin Urine Negative NEG^Negative mg/dL    Urobilinogen Urine 0.2 0.2 - 1.0 EU/dL    Nitrite Urine Positive (A) NEG^Negative    Leukocyte Esterase Urine Small (A) NEG^Negative    Source Midstream Urine    Urine Microscopic   Result Value Ref Range    WBC Urine 10-25 (A) OTO5^0 - 5 /HPF    RBC Urine O - 2 OTO2^O - 2 /HPF    Squamous Epithelial /LPF Urine Few FEW^Few /LPF    Bacteria Urine Many (A) NEG^Negative /HPF       ASSESSMENT/PLAN:         ICD-10-CM    1. Acute cystitis without hematuria N30.00 ciprofloxacin (CIPRO) 500 MG tablet   2. Dysuria R30.0 CANCELED: *UA reflex to Microscopic and Culture ([facility] [place] (except [place] and [place])     Discussed in detail differentials and further management. Symptoms are likely secondary to UTI.  Previous urine cultures reviewed.  Ciprofloxacin prescribed, common side effect discussed.  Recommended well hydration, over-the-counter analgesia. Written instructions/information provided. Patient understood and in agreement with the above plan. All questions are answered. Follow-up if symptoms persist or worsen.      Patient Instructions      * BLADDER INFECTION,Female (Adult)    A bladder infection (\"cystitis\" or \"UTI\") usually causes a constant urge to urinate and a burning when passing urine. Urine may be cloudy, smelly or dark. There may be pain in the lower abdomen. A bladder infection occurs when bacteria from the vaginal area enter the bladder opening (urethra). This can occur from sexual intercourse, wearing tight clothing, dehydration and " other factors.  HOME CARE:  1. Drink lots of fluids (at least 6-8 glasses a day, unless you must restrict fluids for other medical reasons). This will force the medicine into your urinary system and flush the bacteria out of your body. Cranberry juice has been shown to help clear out the bacteria.  2. Avoid sexual intercourse until your symptoms are gone.  3. A bladder infection is treated with antibiotics. You may also be given Pyridium (generic = phenazopyridine) to reduce the burning sensation. This medicine will cause your urine to become a bright orange color. The orange urine may stain clothing. You may wear a pad or panty-liner to protect clothing.  PREVENTING FUTURE INFECTIONS:  1. Always wipe from front to back after a bowel movement.  2. Keep the genital area clean and dry.  3. Drink plenty of fluids each day to avoid dehydration.  4. Urinate right after intercourse to flush out the bladder.  5. Wear cotton underwear and cotton-lined panty hose; avoid tight-fitting pants.  6. If you are on birth control pills and are having frequent bladder infections, discuss with your doctor.  FOLLOW UP: Return to this facility or see your doctor if ALL symptoms are not gone after three days of treatment.  GET PROMPT MEDICAL ATTENTION if any of the following occur:    Fever over 101 F (38.3 C)    No improvement by the third day of treatment    Increasing back or abdominal pain    Repeated vomiting; unable to keep medicine down    Weakness, dizziness or fainting    Vaginal discharge    Pain, redness or swelling in the labia (outer vaginal area)    4711-6192 [employer]. [address] [place] [address], [place] [address]. All rights reserved. This information is not intended as a substitute for professional medical care. Always follow your healthcare professional's instructions.  This information has been modified by your health care provider with permission from the publisher.    Ciprofloxacin tablets  Brand Name:  Cipro  What is this medicine?  CIPROFLOXACIN (sip milan FLOX a sin) is a quinolone antibiotic. It is used to treat certain kinds of bacterial infections. It will not work for colds, flu, or other viral infections.  How should I use this medicine?  Take this medicine by mouth with a glass of water. Follow the directions on the prescription label. Take your medicine at regular intervals. Do not take your medicine more often than directed. Take all of your medicine as directed even if you think your are better. Do not skip doses or stop your medicine early.  You can take this medicine with food or on an empty stomach. It can be taken with a meal that contains dairy or calcium, but do not take it alone with a dairy product, like milk or yogurt or calcium-fortified juice.  A special MedGuide will be given to you by the pharmacist with each prescription and refill. Be sure to read this information carefully each time.  Talk to your pediatrician regarding the use of this medicine in children. Special care may be needed.  What side effects may I notice from receiving this medicine?  Side effects that you should report to your doctor or health care professional as soon as possible:    allergic reactions like skin rash or hives, swelling of the face, lips, or tongue    anxious    confusion    depressed mood    diarrhea    fast, irregular heartbeat    hallucination, loss of contact with reality    joint, muscle, or tendon pain or swelling    pain, tingling, numbness in the hands or feet    suicidal thoughts or other mood changes    sunburn    unusually weak or tired  Side effects that usually do not require medical attention (report to your doctor or health care professional if they continue or are bothersome):    dry mouth    headache    nausea    trouble sleeping  What may interact with this medicine?  Do not take this medicine with any of the following  medications:    cisapride    dofetilide    dronedarone    flibanserin    lomitapide    pimozide    thioridazine    tizanidine    ziprasidone  This medicine may also interact with the following medications:    antacids    birth control pills    caffeine    certain medicines for diabetes, like glipizide or glyburide    certain medicines that treat or prevent blood clots like warfarin    clozapine    cyclosporine    didanosine (ddI) buffered tablets or powder    duloxetine    lanthanum carbonate    lidocaine    methotrexate    multivitamins    NSAIDS, medicines for pain and inflammation, like ibuprofen or naproxen    olanzapine    omeprazole    other medicines that prolong the QT interval (cause an abnormal heart rhythm)    phenytoin    probenecid    ropinirole    sevelamer    sildenafil    sucralfate    theophylline    zolpidem  What if I miss a dose?  If you miss a dose, take it as soon as you can. If it is almost time for your next dose, take only that dose. Do not take double or extra doses.  Where should I keep my medicine?  Keep out of the reach of children.  Store at room temperature below 30 degrees C (86 degrees F). Keep container tightly closed. Throw away any unused medicine after the expiration date.  What should I tell my health care provider before I take this medicine?  They need to know if you have any of these conditions:    bone problems    history of low levels of potassium in the blood    joint problems    irregular heartbeat    kidney disease    myasthenia gravis    seizures    tendon problems    tingling of the fingers or toes, or other nerve disorder    an unusual or allergic reaction to ciprofloxacin, other antibiotics or medicines, foods, dyes, or preservatives    pregnant or trying to get pregnant    breast-feeding  What should I watch for while using this medicine?  Tell your doctor or health care professional if your symptoms do not improve.  Do not treat diarrhea with over the counter  products. Contact your doctor if you have diarrhea that lasts more than 2 days or if it is severe and watery.  You may get drowsy or dizzy. Do not drive, use machinery, or do anything that needs mental alertness until you know how this medicine affects you. Do not stand or sit up quickly, especially if you are an older patient. This reduces the risk of dizzy or fainting spells.  This medicine can make you more sensitive to the sun. Keep out of the sun. If you cannot avoid being in the sun, wear protective clothing and use sunscreen. Do not use sun lamps or tanning beds/booths.  Avoid antacids, aluminum, calcium, iron, magnesium, and zinc products for 6 hours before and 2 hours after taking a dose of this medicine.  NOTE:This sheet is a summary. It may not cover all possible information. If you have questions about this medicine, talk to your doctor, pharmacist, or health care provider. Copyright  2018 [employer]            Kodi Andrews MD  [facility]

## 2018-06-30 NOTE — NURSING NOTE
"Chief Complaint   Patient presents with     Urinary Problem     pt c/o frequency, urgency and odor x 3 days       Initial /64  Pulse 83  Temp 97.9  F (36.6  C) (Oral)  Resp 16  Wt 169 lb (76.7 kg)  SpO2 97%  Breastfeeding? No  BMI 28.12 kg/m2 Estimated body mass index is 28.12 kg/(m^2) as calculated from the following:    Height as of 4/26/18: 5' 5\" (1.651 m).    Weight as of this encounter: 169 lb (76.7 kg).  Medication Reconciliation: complete  Mariam Hager MA    "

## 2018-06-30 NOTE — PATIENT INSTRUCTIONS
"   * BLADDER INFECTION,Female (Adult)    A bladder infection (\"cystitis\" or \"UTI\") usually causes a constant urge to urinate and a burning when passing urine. Urine may be cloudy, smelly or dark. There may be pain in the lower abdomen. A bladder infection occurs when bacteria from the vaginal area enter the bladder opening (urethra). This can occur from sexual intercourse, wearing tight clothing, dehydration and other factors.  HOME CARE:  1. Drink lots of fluids (at least 6-8 glasses a day, unless you must restrict fluids for other medical reasons). This will force the medicine into your urinary system and flush the bacteria out of your body. Cranberry juice has been shown to help clear out the bacteria.  2. Avoid sexual intercourse until your symptoms are gone.  3. A bladder infection is treated with antibiotics. You may also be given Pyridium (generic = phenazopyridine) to reduce the burning sensation. This medicine will cause your urine to become a bright orange color. The orange urine may stain clothing. You may wear a pad or panty-liner to protect clothing.  PREVENTING FUTURE INFECTIONS:  1. Always wipe from front to back after a bowel movement.  2. Keep the genital area clean and dry.  3. Drink plenty of fluids each day to avoid dehydration.  4. Urinate right after intercourse to flush out the bladder.  5. Wear cotton underwear and cotton-lined panty hose; avoid tight-fitting pants.  6. If you are on birth control pills and are having frequent bladder infections, discuss with your doctor.  FOLLOW UP: Return to this facility or see your doctor if ALL symptoms are not gone after three days of treatment.  GET PROMPT MEDICAL ATTENTION if any of the following occur:    Fever over 101 F (38.3 C)    No improvement by the third day of treatment    Increasing back or abdominal pain    Repeated vomiting; unable to keep medicine down    Weakness, dizziness or fainting    Vaginal discharge    Pain, redness or swelling in " the labia (outer vaginal area)    0560-6971 [employer]. [address] [place] [address], [place] [address]. All rights reserved. This information is not intended as a substitute for professional medical care. Always follow your healthcare professional's instructions.  This information has been modified by your health care provider with permission from the publisher.    Ciprofloxacin tablets  Brand Name: Cipro  What is this medicine?  CIPROFLOXACIN (sip milan FLOX a sin) is a quinolone antibiotic. It is used to treat certain kinds of bacterial infections. It will not work for colds, flu, or other viral infections.  How should I use this medicine?  Take this medicine by mouth with a glass of water. Follow the directions on the prescription label. Take your medicine at regular intervals. Do not take your medicine more often than directed. Take all of your medicine as directed even if you think your are better. Do not skip doses or stop your medicine early.  You can take this medicine with food or on an empty stomach. It can be taken with a meal that contains dairy or calcium, but do not take it alone with a dairy product, like milk or yogurt or calcium-fortified juice.  A special MedGuide will be given to you by the pharmacist with each prescription and refill. Be sure to read this information carefully each time.  Talk to your pediatrician regarding the use of this medicine in children. Special care may be needed.  What side effects may I notice from receiving this medicine?  Side effects that you should report to your doctor or health care professional as soon as possible:    allergic reactions like skin rash or hives, swelling of the face, lips, or tongue    anxious    confusion    depressed mood    diarrhea    fast, irregular heartbeat    hallucination, loss of contact with reality    joint, muscle, or tendon pain or swelling    pain, tingling, numbness in the hands or feet    suicidal thoughts or other mood  changes    sunburn    unusually weak or tired  Side effects that usually do not require medical attention (report to your doctor or health care professional if they continue or are bothersome):    dry mouth    headache    nausea    trouble sleeping  What may interact with this medicine?  Do not take this medicine with any of the following medications:    cisapride    dofetilide    dronedarone    flibanserin    lomitapide    pimozide    thioridazine    tizanidine    ziprasidone  This medicine may also interact with the following medications:    antacids    birth control pills    caffeine    certain medicines for diabetes, like glipizide or glyburide    certain medicines that treat or prevent blood clots like warfarin    clozapine    cyclosporine    didanosine (ddI) buffered tablets or powder    duloxetine    lanthanum carbonate    lidocaine    methotrexate    multivitamins    NSAIDS, medicines for pain and inflammation, like ibuprofen or naproxen    olanzapine    omeprazole    other medicines that prolong the QT interval (cause an abnormal heart rhythm)    phenytoin    probenecid    ropinirole    sevelamer    sildenafil    sucralfate    theophylline    zolpidem  What if I miss a dose?  If you miss a dose, take it as soon as you can. If it is almost time for your next dose, take only that dose. Do not take double or extra doses.  Where should I keep my medicine?  Keep out of the reach of children.  Store at room temperature below 30 degrees C (86 degrees F). Keep container tightly closed. Throw away any unused medicine after the expiration date.  What should I tell my health care provider before I take this medicine?  They need to know if you have any of these conditions:    bone problems    history of low levels of potassium in the blood    joint problems    irregular heartbeat    kidney disease    myasthenia gravis    seizures    tendon problems    tingling of the fingers or toes, or other nerve disorder    an  unusual or allergic reaction to ciprofloxacin, other antibiotics or medicines, foods, dyes, or preservatives    pregnant or trying to get pregnant    breast-feeding  What should I watch for while using this medicine?  Tell your doctor or health care professional if your symptoms do not improve.  Do not treat diarrhea with over the counter products. Contact your doctor if you have diarrhea that lasts more than 2 days or if it is severe and watery.  You may get drowsy or dizzy. Do not drive, use machinery, or do anything that needs mental alertness until you know how this medicine affects you. Do not stand or sit up quickly, especially if you are an older patient. This reduces the risk of dizzy or fainting spells.  This medicine can make you more sensitive to the sun. Keep out of the sun. If you cannot avoid being in the sun, wear protective clothing and use sunscreen. Do not use sun lamps or tanning beds/booths.  Avoid antacids, aluminum, calcium, iron, magnesium, and zinc products for 6 hours before and 2 hours after taking a dose of this medicine.  NOTE:This sheet is a summary. It may not cover all possible information. If you have questions about this medicine, talk to your doctor, pharmacist, or health care provider. Copyright  2018 [employer]

## 2018-07-02 LAB
BACTERIA SPEC CULT: ABNORMAL
SPECIMEN SOURCE: ABNORMAL

## 2018-07-15 DIAGNOSIS — N39.46 MIXED INCONTINENCE URGE AND STRESS (MALE)(FEMALE): ICD-10-CM

## 2018-07-17 RX ORDER — TROSPIUM CHLORIDE ER 60 MG/1
60 CAPSULE ORAL EVERY MORNING
Qty: 90 CAPSULE | Refills: 3 | Status: SHIPPED | OUTPATIENT
Start: 2018-07-17 | End: 2023-02-28

## 2018-08-05 DIAGNOSIS — N39.0 RECURRENT UTI: ICD-10-CM

## 2018-08-08 RX ORDER — TRIMETHOPRIM 100 MG/1
100 TABLET ORAL DAILY
Qty: 30 TABLET | Refills: 3 | Status: SHIPPED | OUTPATIENT
Start: 2018-08-08 | End: 2018-09-27 | Stop reason: ALTCHOICE

## 2018-08-08 NOTE — TELEPHONE ENCOUNTER
trimethoprim (TRIMPEX) 100 MG   Last Written Prescription Date:  11/20/17  Last Fill Quantity: 30,   # refills: 3  Last Office Visit : 4/26/18  Future Office visit:  10/25/18    Routing refill request to provider for review/approval because:  Drug not on the refill protocol

## 2018-09-11 ENCOUNTER — TELEPHONE (OUTPATIENT)
Dept: UROLOGY | Facility: CLINIC | Age: 63
End: 2018-09-11

## 2018-09-11 NOTE — TELEPHONE ENCOUNTER
----- Message from Milagro Dimas MD sent at 9/11/2018  2:02 PM CDT -----  She needs to return to see me     Thanks    C  ----- Message -----     From: Darren Monson LPN     Sent: 9/11/2018   1:52 PM       To: Milagro See Demetrius Dimas MD    After speaking to her she needs help with urgency she feels that the medication is not working it looks like she has been on myrbetriq and also trospium. ??? darren

## 2018-09-13 ENCOUNTER — OFFICE VISIT (OUTPATIENT)
Dept: UROLOGY | Facility: CLINIC | Age: 63
End: 2018-09-13
Payer: COMMERCIAL

## 2018-09-13 VITALS
HEIGHT: 65 IN | WEIGHT: 171 LBS | DIASTOLIC BLOOD PRESSURE: 66 MMHG | HEART RATE: 81 BPM | BODY MASS INDEX: 28.49 KG/M2 | SYSTOLIC BLOOD PRESSURE: 104 MMHG

## 2018-09-13 DIAGNOSIS — M62.89 PELVIC FLOOR DYSFUNCTION: ICD-10-CM

## 2018-09-13 DIAGNOSIS — N39.41 URGE INCONTINENCE: Primary | ICD-10-CM

## 2018-09-13 DIAGNOSIS — Z98.890 HISTORY OF SUBURETHRAL SLING PROCEDURE: ICD-10-CM

## 2018-09-13 RX ORDER — ESCITALOPRAM OXALATE 10 MG/1
TABLET ORAL
COMMUNITY
Start: 2018-08-01

## 2018-09-13 ASSESSMENT — PAIN SCALES - GENERAL: PAINLEVEL: NO PAIN (0)

## 2018-09-13 NOTE — LETTER
"9/13/2018       RE: Slim Mcbride  [address] [facility] [address]     Dear Colleague,    Thank you for referring your patient, Slim Mcbride, to the [facility] UROLOGY AND [facility] FOR PROSTATE AND UROLOGIC CANCERS at [facility]. Please see a copy of my visit note below.    09/13/18    Return visit    Ms. Mcbride is a 63 year old female with mixed urinary incontinence s/p TVT on 3/12/18 who presents today for follow up. Patient reports continued urge symptoms that she feels is worse. She would like to increase her dose of Sanctura XR. She has had 2 UTIs since placement of TVT. She denies any urethral bleeding.     /66  Pulse 81  Ht 1.651 m (5' 5\")  Wt 77.6 kg (171 lb)  BMI 28.46 kg/m2  She is alert, oriented, in no acute distress with non labored breathing    PVR 53 mL by bladder scan    A/P: 63 year old F with mixed incontinence s/p TVT with worsening urgency, urge incontinence currently on Sanctura, history of pelvic floor dysfunction    Given the persistent urgency and UTI (although she was having UTI prior to surgery) discussed returning to pelvic floor therapy versus cystoscopy.  Patient would like to proceed with cystoscopy to evaluate for any mesh erosion prior to proceeding with pelvic floor therapy    Patient examined with Dr. Judie Miramontes MD  Urology Resident PGY-4    Addendum:    The patient was seen and evaluated with the resident.  The plan was formulated in conjunction with me and I agree with the above note with changes made as necessary.    15 minutes were spent with the patient today, > 50% in counseling and coordination of care    Milagro Dimas MD MPH   of Urology    CC  Patient Care Team:  Joe Cardenas MD as PCP - General (Family Practice)  Kelsey Kenyon NP as MD (Nurse Practitioner)  Milagro Dimas MD as MD (Urology)  Laura Yuan RN as Registered Nurse (Urology)  Joe Cardenas MD as Referring " Physician (Family Practice)  KEENAN HUMPHRIES

## 2018-09-13 NOTE — PATIENT INSTRUCTIONS
Please have a urine culture 7-10 days prior to the cystoscopy appointment     Please return for a cystoscopy (procedure to look in the bladder) and pelvic exam 9/27/2018 2pm    It was a pleasure meeting with you today.  Thank you for allowing me and my team the privilege of caring for you today.  YOU are the reason we are here, and I truly hope we provided you with the excellent service you deserve.  Please let us know if there is anything else we can do for you so that we can be sure you are leaving completely satisfied with your care experience.    Cystoscopy    Cystoscopy is a procedure that lets your doctor look directly inside your urethra and bladder. It can be used to:    Help diagnose a problem with your urethra, bladder, or kidneys.    Take a sample (biopsy) of bladder or urethral tissue.    Treat certain problems (such as removing kidney stones).    Place a stent to bypass an obstruction.    Take special X-rays of the kidneys.  Based on the findings, your doctor may recommend other tests or treatments.  What is a cystoscope?  A cystoscope is a telescope-like instrument that contains lenses and fiberoptics (small glass wires that make bright light). The cystoscope may be straight and rigid, or flexible to bend around curves in the urethra. The doctor may look directly into the cystoscope, or project the image onto a monitor.  Getting ready    Ask your doctor if you should stop taking any medicines before the procedure.    Follow any other instructions your doctor gives you.  Tell your doctor before the exam if you:    Take any medicines, such as aspirin or blood thinners    Have allergies to any medicines    Are pregnant   The procedure  Cystoscopy is done in the doctor s office, surgery center, or hospital. The doctor and a nurse are present during the procedure. It takes only a few minutes, longer if a biopsy, X-ray, or treatment needs to be done.  During the procedure:    You lie on an exam table on your  back, knees bent and legs apart. You are covered with a drape.    Your urethra and the area around it are washed. Anesthetic jelly may be applied to numb the urethra.    The cystoscope is inserted. A sterile fluid is put into the bladder to expand it. You may feel pressure from this fluid.    When the procedure is done, the cystoscope is removed.  After the procedure   Once you re home:    Drink plenty of fluids.    You may have burning or light bleeding when you urinate--this is normal.    Medicines may be prescribed to ease any discomfort or prevent infection. Take these as directed.    Call your doctor if you have heavy bleeding or blood clots, burning that lasts more than a day, a fever over 100 F  (38  C), or trouble urinating.  Date Last Reviewed: 1/1/2017 2000-2017 [employer]. [address] [place] [address]. All rights reserved. This information is not intended as a substitute for professional medical care. Always follow your healthcare professional's instructions.

## 2018-09-13 NOTE — MR AVS SNAPSHOT
After Visit Summary   9/13/2018    Slim Mcbride    MRN: [id number]           Patient Information     Date Of Birth          1955        Visit Information        Provider Department      9/13/2018 8:30 AM Milagro Dimas MD [facility] Urology and [facility] for Prostate and Urologic Cancers        Today's Diagnoses     Urge incontinence    -  1    History of suburethral sling procedure          Care Instructions    Please have a urine culture 7-10 days prior to the cystoscopy appointment     Please return for a cystoscopy (procedure to look in the bladder) and pelvic exam 9/27/2018 2pm    It was a pleasure meeting with you today.  Thank you for allowing me and my team the privilege of caring for you today.  YOU are the reason we are here, and I truly hope we provided you with the excellent service you deserve.  Please let us know if there is anything else we can do for you so that we can be sure you are leaving completely satisfied with your care experience.    Cystoscopy    Cystoscopy is a procedure that lets your doctor look directly inside your urethra and bladder. It can be used to:    Help diagnose a problem with your urethra, bladder, or kidneys.    Take a sample (biopsy) of bladder or urethral tissue.    Treat certain problems (such as removing kidney stones).    Place a stent to bypass an obstruction.    Take special X-rays of the kidneys.  Based on the findings, your doctor may recommend other tests or treatments.  What is a cystoscope?  A cystoscope is a telescope-like instrument that contains lenses and fiberoptics (small glass wires that make bright light). The cystoscope may be straight and rigid, or flexible to bend around curves in the urethra. The doctor may look directly into the cystoscope, or project the image onto a monitor.  Getting ready    Ask your doctor if you should stop taking any medicines before the procedure.    Follow any other instructions your doctor gives  you.  Tell your doctor before the exam if you:    Take any medicines, such as aspirin or blood thinners    Have allergies to any medicines    Are pregnant   The procedure  Cystoscopy is done in the doctor s office, surgery center, or hospital. The doctor and a nurse are present during the procedure. It takes only a few minutes, longer if a biopsy, X-ray, or treatment needs to be done.  During the procedure:    You lie on an exam table on your back, knees bent and legs apart. You are covered with a drape.    Your urethra and the area around it are washed. Anesthetic jelly may be applied to numb the urethra.    The cystoscope is inserted. A sterile fluid is put into the bladder to expand it. You may feel pressure from this fluid.    When the procedure is done, the cystoscope is removed.  After the procedure   Once you re home:    Drink plenty of fluids.    You may have burning or light bleeding when you urinate--this is normal.    Medicines may be prescribed to ease any discomfort or prevent infection. Take these as directed.    Call your doctor if you have heavy bleeding or blood clots, burning that lasts more than a day, a fever over 100 F  (38  C), or trouble urinating.  Date Last Reviewed: 1/1/2017 2000-2017 [employer]. [address] [place] [address], [place] [address]. All rights reserved. This information is not intended as a substitute for professional medical care. Always follow your healthcare professional's instructions.            Follow-ups after your visit        Your next 10 appointments already scheduled     Dec 06, 2018 12:15 PM CST   (Arrive by 12:00 PM)   Return Visit with Milagro Dimas MD   [facility] Urology and [facility] for Prostate and Urologic Cancers ([facility])    [address] [place] [address] [phone]              Who to contact     Please call your clinic at [phone] to:    Ask questions about your health    Make or  "cancel appointments    Discuss your medicines    Learn about your test results    Speak to your doctor            Additional Information About Your Visit        [employer] Information     [employer] gives you secure access to your electronic health record. If you see a primary care provider, you can also send messages to your care team and make appointments. If you have questions, please call your primary care clinic.  If you do not have a primary care provider, please call [phone] and they will assist you.      [employer] is an electronic gateway that provides easy, online access to your medical records. With [employer], you can request a clinic appointment, read your test results, renew a prescription or communicate with your care team.     To access your existing account, please contact your [facility] or call [phone] for assistance.        Care EveryWhere ID     This is your Care EveryWhere ID. This could be used by other organizations to access your [place] medical records  [id number]        Your Vitals Were     Pulse Height BMI (Body Mass Index)             81 1.651 m (5' 5\") 28.46 kg/m2          Blood Pressure from Last 3 Encounters:   09/13/18 104/66   06/30/18 112/64   04/26/18 122/67    Weight from Last 3 Encounters:   09/13/18 77.6 kg (171 lb)   06/30/18 76.7 kg (169 lb)   04/26/18 74.8 kg (165 lb)              We Performed the Following     POST-VOID RESIDUAL BLADDER SCAN          Today's Medication Changes          These changes are accurate as of 9/13/18  9:25 AM.  If you have any questions, ask your nurse or doctor.               Stop taking these medicines if you haven't already. Please contact your care team if you have questions.     ACETAMINOPHEN PO   Stopped by:  Milagro Dimas MD           cephALEXin 500 MG capsule   Commonly known as:  KEFLEX   Stopped by:  Milagro Dimas MD           ciprofloxacin 500 MG tablet   Commonly known as:  CIPRO "   Stopped by:  Milagro Dimas MD           FLUOXETINE HCL PO   Stopped by:  Milagro Dimas MD           ketorolac 10 MG tablet   Commonly known as:  TORADOL   Stopped by:  Milagro Dimas MD           mirabegron 25 MG 24 hr tablet   Commonly known as:  MYRBETRIQ   Stopped by:  Milagro Dimas MD           nortriptyline 10 MG capsule   Commonly known as:  PAMELOR   Stopped by:  Milagro Dimas MD                    Primary Care Provider Office Phone # Fax #    Joe Cardenas [phone]       [facility] [address] [facility] [address]        Equal Access to Services     [facility]: Hadii magui pritchard hadasho Soomaali, waaxda luqadaha, qaybta kaalmada adeegyada, waxnato brock . So [facility] [place] [phone].    ATENCIÓN: Si habla español, tiene a azevedo disposición servicios gratuitos de asistencia lingüística. Ll[facility] [phone].    We comply with applicable federal civil rights laws and [place] laws. We do not discriminate on the basis of race, color, national origin, age, disability, sex, sexual orientation, or gender identity.            Thank you!     Thank you for choosing [facility] UROLOGY AND [facility] FOR PROSTATE AND UROLOGIC CANCERS  for your care. Our goal is always to provide you with excellent care. Hearing back from our patients is one way we can continue to improve our services. Please take a few minutes to complete the written survey that you may receive in the mail after your visit with us. Thank you!             Your Updated Medication List - Protect others around you: Learn how to safely use, store and throw away your medicines at www.disposemymeds.org.          This list is accurate as of 9/13/18  9:25 AM.  Always use your most recent med list.                   Brand Name Dispense Instructions for use Diagnosis    aspirin 81 MG tablet      Take 81 mg by mouth every evening        calcium citrate-vitamin D 315-200  MG-UNIT Tabs per tablet    CITRACAL     Take 1 tablet by mouth daily        CENTRUM SILVER Chew      Take 1 tablet by mouth daily        cyanocobalamin 1000 MCG Tabs      Take 1,000 mcg by mouth daily        diphenhydrAMINE 50 MG capsule    BENADRYL     Take 50 mg by mouth nightly as needed        escitalopram 10 MG tablet    LEXAPRO          fexofenadine 180 MG tablet    ALLEGRA     Take 180 mg by mouth daily        GABAPENTIN PO      Take by mouth 3 times daily        MAGNESIUM OXIDE PO      Take 400 mg by mouth daily        omeprazole 20 MG CR capsule    priLOSEC          trimethoprim 100 MG tablet    TRIMPEX    30 tablet    Take 1 tablet (100 mg) by mouth daily    Recurrent UTI       trospium 60 MG Cp24 24 hr capsule    SANCTURA XR    90 capsule    Take 1 capsule (60 mg) by mouth every morning    Mixed incontinence urge and stress (male)(female)

## 2018-09-13 NOTE — PROGRESS NOTES
"09/13/18    Return visit    Ms. Mcbride is a 63 year old female with mixed urinary incontinence s/p TVT on 3/12/18 who presents today for follow up. Patient reports continued urge symptoms that she feels is worse. She would like to increase her dose of Sanctura XR. She has had 2 UTIs since placement of TVT. She denies any urethral bleeding.     /66  Pulse 81  Ht 1.651 m (5' 5\")  Wt 77.6 kg (171 lb)  BMI 28.46 kg/m2  She is alert, oriented, in no acute distress with non labored breathing    PVR 53 mL by bladder scan    A/P: 63 year old F with mixed incontinence s/p TVT with worsening urgency, urge incontinence currently on Sanctura, history of pelvic floor dysfunction    Given the persistent urgency and UTI (although she was having UTI prior to surgery) discussed returning to pelvic floor therapy versus cystoscopy.  Patient would like to proceed with cystoscopy to evaluate for any mesh erosion prior to proceeding with pelvic floor therapy    Patient examined with Dr. Judie Miramontes MD  Urology Resident PGY-4    Addendum:    The patient was seen and evaluated with the resident.  The plan was formulated in conjunction with me and I agree with the above note with changes made as necessary.    15 minutes were spent with the patient today, > 50% in counseling and coordination of care    Milagro Dimas MD MPH   of Urology    CC  Patient Care Team:  Joe Cardenas MD as PCP - General (Family Practice)  Kelsey Kenyon NP as MD (Nurse Practitioner)  Milagro Dimas MD as MD (Urology)  Laura Yuan RN as Registered Nurse (Urology)  Joe Cardenas MD as Referring Physician (Family Practice)  JOE CARDENAS              "

## 2018-09-20 ENCOUNTER — PRE VISIT (OUTPATIENT)
Dept: UROLOGY | Facility: CLINIC | Age: 63
End: 2018-09-20

## 2018-09-20 NOTE — TELEPHONE ENCOUNTER
Reason for visit: cystoscopy     Relevant information: evaluate for any mesh erosion prior to proceeding with pelvic floor therapy    Records/imaging/labs: all records available    Pt called: no need for a call    Rooming: dip

## 2018-09-21 ENCOUNTER — ALLIED HEALTH/NURSE VISIT (OUTPATIENT)
Dept: UROLOGY | Facility: CLINIC | Age: 63
End: 2018-09-21
Payer: COMMERCIAL

## 2018-09-21 DIAGNOSIS — N39.46 MIXED INCONTINENCE URGE AND STRESS (MALE)(FEMALE): Primary | ICD-10-CM

## 2018-09-21 NOTE — MR AVS SNAPSHOT
After Visit Summary   9/21/2018    Slim Mcbride    MRN: [id number]           Patient Information     Date Of Birth          1955        Visit Information        Provider Department      9/21/2018 3:00 PM Nurse, [facility] Urology and [facility] for Prostate and Urologic Cancers        Today's Diagnoses     Mixed incontinence urge and stress (male)(female)    -  1       Follow-ups after your visit        Your next 10 appointments already scheduled     Sep 27, 2018  2:00 PM CDT   (Arrive by 1:45 PM)   Cystoscopy with Milagro Dimas MD   [facility] Urology and [facility] for Prostate and Urologic Cancers ([facility])    [address] [place] [address] [phone]            Dec 06, 2018 12:15 PM CST   (Arrive by 12:00 PM)   Return Visit with Milagro Dimas MD   [facility] Urology and [facility] for Prostate and Urologic Cancers ([facility])    [address] [place] [address] [phone]              Who to contact     Please call your clinic at [phone] to:    Ask questions about your health    Make or cancel appointments    Discuss your medicines    Learn about your test results    Speak to your doctor            Additional Information About Your Visit        garbs Information     garbs gives you secure access to your electronic health record. If you see a primary care provider, you can also send messages to your care team and make appointments. If you have questions, please call your primary care clinic.  If you do not have a primary care provider, please call [phone] and they will assist you.      garbs is an electronic gateway that provides easy, online access to your medical records. With garbs, you can request a clinic appointment, read your test results, renew a prescription or communicate with your care team.     To access your existing account, please  contact your [facility] or call [phone] for assistance.        Care EveryWhere ID     This is your Care EveryWhere ID. This could be used by other organizations to access your [place] medical records  [id number]         Blood Pressure from Last 3 Encounters:   09/13/18 104/66   06/30/18 112/64   04/26/18 122/67    Weight from Last 3 Encounters:   09/13/18 77.6 kg (171 lb)   06/30/18 76.7 kg (169 lb)   04/26/18 74.8 kg (165 lb)              We Performed the Following     Urine Culture Aerobic Bacterial        Primary Care Provider Office Phone # Fax #    Joe Cardenas [phone]       [facility] [id number] [place] [address]        Equal Access to Services     [facility] : Hadii magui pritchard hadasho Soomaali, waaxda luqadaha, qaybta kaalmada adeegyada, rosanne brock . So [facility] [phone].    ATENCIÓN: Si habla español, tiene a azevedo disposición servicios gratuitos de asistencia lingüística. Lida al [phone].    We comply with applicable federal civil rights laws and [place] laws. We do not discriminate on the basis of race, color, national origin, age, disability, sex, sexual orientation, or gender identity.            Thank you!     Thank you for choosing [facility] UROLOGY AND [facility] FOR PROSTATE AND UROLOGIC CANCERS  for your care. Our goal is always to provide you with excellent care. Hearing back from our patients is one way we can continue to improve our services. Please take a few minutes to complete the written survey that you may receive in the mail after your visit with us. Thank you!             Your Updated Medication List - Protect others around you: Learn how to safely use, store and throw away your medicines at www.disposemymeds.org.          This list is accurate as of 9/21/18  3:06 PM.  Always use your most recent med list.                   Brand Name Dispense Instructions for use Diagnosis     aspirin 81 MG tablet      Take 81 mg by mouth every evening        calcium citrate-vitamin D 315-200 MG-UNIT Tabs per tablet    CITRACAL     Take 1 tablet by mouth daily        CENTRUM SILVER Chew      Take 1 tablet by mouth daily        cyanocobalamin 1000 MCG Tabs      Take 1,000 mcg by mouth daily        diphenhydrAMINE 50 MG capsule    BENADRYL     Take 50 mg by mouth nightly as needed        escitalopram 10 MG tablet    LEXAPRO          fexofenadine 180 MG tablet    ALLEGRA     Take 180 mg by mouth daily        GABAPENTIN PO      Take by mouth 3 times daily        MAGNESIUM OXIDE PO      Take 400 mg by mouth daily        omeprazole 20 MG CR capsule    priLOSEC          trimethoprim 100 MG tablet    TRIMPEX    30 tablet    Take 1 tablet (100 mg) by mouth daily    Recurrent UTI       trospium 60 MG Cp24 24 hr capsule    SANCTURA XR    90 capsule    Take 1 capsule (60 mg) by mouth every morning    Mixed incontinence urge and stress (male)(female)

## 2018-09-21 NOTE — PROGRESS NOTES
Chief Complaint   Patient presents with     Allied Health Visit     catheterized urine sample       Patient Active Problem List   Diagnosis     CARDIOVASCULAR SCREENING; LDL GOAL LESS THAN 160     Mixed incontinence urge and stress (male)(female)     Somatic dysfunction of pelvis region     Allergic rhinitis     B12 deficiency     Recurrent ventral incisional hernia       Allergies   Allergen Reactions     Nkda [No Known Drug Allergies]      No Clinical Screening - See Comments Rash and Other (See Comments)     Pt received medication for over active bladder which caused itching- doesn;t remember the name of it.  Pt received medication for over active bladder which caused itching- doesn;t remember the name of it.       Current Outpatient Prescriptions   Medication Sig Dispense Refill     aspirin 81 MG tablet Take 81 mg by mouth every evening        calcium citrate-vitamin D (CITRACAL) 315-200 MG-UNIT TABS per tablet Take 1 tablet by mouth daily        cyanocobalamin 1000 MCG TABS Take 1,000 mcg by mouth daily        diphenhydrAMINE (BENADRYL) 50 MG capsule Take 50 mg by mouth nightly as needed        escitalopram (LEXAPRO) 10 MG tablet        fexofenadine (ALLEGRA) 180 MG tablet Take 180 mg by mouth daily        GABAPENTIN PO Take by mouth 3 times daily       MAGNESIUM OXIDE PO Take 400 mg by mouth daily       Multiple Vitamins-Minerals (CENTRUM SILVER) CHEW Take 1 tablet by mouth daily        omeprazole (PRILOSEC) 20 MG CR capsule        trimethoprim (TRIMPEX) 100 MG tablet Take 1 tablet (100 mg) by mouth daily 30 tablet 3     trospium (SANCTURA XR) 60 MG CP24 24 hr capsule Take 1 capsule (60 mg) by mouth every morning 90 capsule 3       Social History   Substance Use Topics     Smoking status: Former Smoker     Packs/day: 1.00     Years: 8.00     Types: Cigarettes     Quit date: 1/2/1978     Smokeless tobacco: Never Used     Alcohol use Yes      Comment: 2-3 drinks one to two times a month.           Slim Mcbride  comes into clinic today at the request of Dr. Milagro Dimas for catheterized UC sample.    Patient catheterized in usual sterile fashion with no complications. Sample sent for UC. Patient to have cystoscopy next week.    This service provided today was under the supervising provider of the day Dr. Luis A Resendiz, who was available if needed.      Kandice Edmonds LPN  9/21/2018  3:03 PM

## 2018-09-23 LAB
BACTERIA SPEC CULT: ABNORMAL
Lab: ABNORMAL
SPECIMEN SOURCE: ABNORMAL

## 2018-09-24 DIAGNOSIS — N39.0 URINARY TRACT INFECTION: Primary | ICD-10-CM

## 2018-09-24 RX ORDER — CEPHALEXIN 500 MG/1
500 CAPSULE ORAL 2 TIMES DAILY
Qty: 10 CAPSULE | Refills: 0 | Status: SHIPPED | OUTPATIENT
Start: 2018-09-24 | End: 2018-09-29

## 2018-09-27 ENCOUNTER — OFFICE VISIT (OUTPATIENT)
Dept: UROLOGY | Facility: CLINIC | Age: 63
End: 2018-09-27
Payer: COMMERCIAL

## 2018-09-27 VITALS
HEART RATE: 71 BPM | BODY MASS INDEX: 29.2 KG/M2 | WEIGHT: 175.3 LBS | HEIGHT: 65 IN | DIASTOLIC BLOOD PRESSURE: 66 MMHG | SYSTOLIC BLOOD PRESSURE: 93 MMHG

## 2018-09-27 DIAGNOSIS — N39.41 URGE INCONTINENCE: ICD-10-CM

## 2018-09-27 DIAGNOSIS — Z87.440 PERSONAL HISTORY OF URINARY TRACT INFECTION: Primary | ICD-10-CM

## 2018-09-27 DIAGNOSIS — Z98.890 HISTORY OF SUBURETHRAL SLING PROCEDURE: ICD-10-CM

## 2018-09-27 RX ORDER — MIRABEGRON 25 MG/1
25 TABLET, FILM COATED, EXTENDED RELEASE ORAL DAILY
Qty: 30 TABLET | Refills: 3 | Status: SHIPPED | OUTPATIENT
Start: 2018-09-27

## 2018-09-27 RX ORDER — NITROFURANTOIN 25; 75 MG/1; MG/1
100 CAPSULE ORAL DAILY
Qty: 30 CAPSULE | Refills: 3 | Status: SHIPPED | OUTPATIENT
Start: 2018-09-27 | End: 2019-01-21

## 2018-09-27 ASSESSMENT — PAIN SCALES - GENERAL: PAINLEVEL: NO PAIN (0)

## 2018-09-27 NOTE — PATIENT INSTRUCTIONS
"Please do the CT scan    Please complete the cephalexin please start the nitrofurantoin daily    Please take the mirabegron and trospium daily    Please return to see us in 6-8 weeks, sooner if needed    It was a pleasure meeting with you today.  Thank you for allowing me and my team the privilege of caring for you today.  YOU are the reason we are here, and I truly hope we provided you with the excellent service you deserve.  Please let us know if there is anything else we can do for you so that we can be sure you are leaving completely satisfied with your care experience.    AFTER YOUR CYSTOSCOPY        You have just completed a cystoscopy, or \"cysto\", which allowed your physician to learn more about your bladder (or to remove a stent placed after surgery). We suggest that you continue to avoid caffeine, fruit juice, and alcohol for the next 24 hours, however, you are encouraged to return to your normal activities.         A few things that are considered normal after your cystoscopy:     * Small amount of bleeding (or spotting) that clears within the next 24 hours     * Slight burning sensation with urination     * Sensation to of needing to avoid more frequently     * The feeling of \"air\" in your urine     * Mild discomfort that is relieved with Tylenol        Please contact our office promptly if you:     * Develop a fever above 101 degrees     * Are unable to urinate     * Develop bright red blood that does not stop     * Severe pain or swelling         Please contact our office with any concerns or questions @[phone]  "

## 2018-09-27 NOTE — NURSING NOTE
"Chief Complaint   Patient presents with     RECHECK     UTIs, urge incontinence, s/p of sling       Blood pressure 93/66, pulse 71, height 1.651 m (5' 5\"), weight 79.5 kg (175 lb 4.8 oz), not currently breastfeeding. Body mass index is 29.17 kg/(m^2).    Patient Active Problem List   Diagnosis     CARDIOVASCULAR SCREENING; LDL GOAL LESS THAN 160     Mixed incontinence urge and stress (male)(female)     Somatic dysfunction of pelvis region     Allergic rhinitis     B12 deficiency     Recurrent ventral incisional hernia       Allergies   Allergen Reactions     Nkda [No Known Drug Allergies]      No Clinical Screening - See Comments Rash and Other (See Comments)     Pt received medication for over active bladder which caused itching- doesn;t remember the name of it.  Pt received medication for over active bladder which caused itching- doesn;t remember the name of it.       Current Outpatient Prescriptions   Medication Sig Dispense Refill     aspirin 81 MG tablet Take 81 mg by mouth every evening        calcium citrate-vitamin D (CITRACAL) 315-200 MG-UNIT TABS per tablet Take 1 tablet by mouth daily        cephALEXin (KEFLEX) 500 MG capsule Take 1 capsule (500 mg) by mouth 2 times daily for 5 days 10 capsule 0     cyanocobalamin 1000 MCG TABS Take 1,000 mcg by mouth daily        diphenhydrAMINE (BENADRYL) 50 MG capsule Take 50 mg by mouth nightly as needed        escitalopram (LEXAPRO) 10 MG tablet        fexofenadine (ALLEGRA) 180 MG tablet Take 180 mg by mouth daily        GABAPENTIN PO Take by mouth 3 times daily       MAGNESIUM OXIDE PO Take 400 mg by mouth daily       Multiple Vitamins-Minerals (CENTRUM SILVER) CHEW Take 1 tablet by mouth daily        omeprazole (PRILOSEC) 20 MG CR capsule        trimethoprim (TRIMPEX) 100 MG tablet Take 1 tablet (100 mg) by mouth daily 30 tablet 3     trospium (SANCTURA XR) 60 MG CP24 24 hr capsule Take 1 capsule (60 mg) by mouth every morning 90 capsule 3       Social History "   Substance Use Topics     Smoking status: Former Smoker     Packs/day: 1.00     Years: 8.00     Types: Cigarettes     Quit date: 1978     Smokeless tobacco: Never Used     Alcohol use Yes      Comment: 2-3 drinks one to two times a month.         Jadyn DixonMITESH  2018  2:30 PM     Invasive Procedure Safety Checklist:    Procedure:     Action: Complete sections and checkboxes as appropriate.    Pre-procedure:  1. Patient ID Verified with 2 identifiers (Kira and  or MRN) : YES    2. Procedure and site verified with patient/designee (when able) : YES    3. Accurate consent documentation in medical record : YES    4. H&P (or appropriate assessment) documented in medical record : YES  H&P must be up to 30 days prior to procedure an updated within 24 hours of                 Procedure as applicable.     5. Relevant diagnostic and radiology test results appropriately labeled and displayed as applicable : YES    6. Blood products, implants, devices, and/or special equipment available for the procedure as applicable : YES    7. Procedure site(s) marked with provider initials [Exclusions: None] : NO    8. Marking not required. Reason : Yes  Procedure does not require site marking    Time Out:     Time-Out performed immediately prior to starting procedure, including verbal and active participation of all team members addressing: YES    1. Correct patient identity.  2. Confirmed that the correct side and site are marked.  3. An accurate procedure to be done.  4. Agreement on the procedure to be done.  5. Correct patient position.  6. Relevant images and results are properly labeled and appropriately displayed.  7. The need to administer antibiotics or fluids for irrigation purposes during the procedure as applicable.  8. Safety precautions based on patient history or medication use.    During Procedure: Verification of correct person, site, and procedure occurs any time the responsibility for care of the  patient is transferred to another member of the care team.      The following medication was given:     MEDICATION:  Lidocaine 2% jelly  ROUTE: topical  SITE: urethra via the meatus  DOSE: 10 mL  LOT #: [id number]  : IMS, Limited  EXPIRATION DATE: 04/20  NDC#: [address]-1   Was there drug waste? No  Multi-dose vial: Sandee Dixon CMA  September 27, 2018

## 2018-09-27 NOTE — LETTER
"9/27/2018       RE: Slim Mcbride  [address] [facility] [address]     Dear Colleague,    Thank you for referring your patient, Slim Mcbride, to the [facility] UROLOGY AND INST FOR PROSTATE AND UROLOGIC CANCERS at [facility]. Please see a copy of my visit note below.    September 27, 2018    Return visit    Patient returns today for follow up.  On antibiotics for a UTI.  She states that she will be having to see a urologist at her local clinic next year per her PCP. She denies any changes in her health since last visit.    BP 93/66  Pulse 71  Ht 1.651 m (5' 5\")  Wt 79.5 kg (175 lb 4.8 oz)  BMI 29.17 kg/m2  She is comfortable, in no distress, non-labored breathing.  Abdomen is soft, non-tender, non-distended.  Normal external female genitalia.  Negative CST.  Pelvic exam is remarkable for mild myofascial tenderness of the pelvic floor, pelvic floor dysfunction    Cystoscopy Note: After informed consent was obtained patient was prepped and draped in the standard fashion.  The flexible cystoscope was inserted into a normal appearing urethral meatus.  The urothelium was carefully examined and there was scattered erythematous patches consistent with recent UTI.  There were no tumors, masses, stones, foreign bodies, or other urothelial abnormalities noted.  Bilateral ureteral orifices were noted in the normal orthotopic position and both effluxed clear urine.  The cystoscope was retroflexed and the bladder neck was unremarkable.  The urethra was carefully examined upon removing the cystoscope and was unremarkable.  Patient tolerated the procedure without complications noted.      A/P: 63 year old F with history of TVT, UTI, persistent urge incontinence    Switch to macrobid prophylaxis based on recent cultures.  Did discuss the rare side effect of pulmonary fibrosis    On reviewing her symptoms and medications at one point back in April she was doing well but since then " unclear why she stopped the mirabegron.  At this time plan to restart the mirabegron in addition to the trospium.      Will reassess in 6-8 weeks symptoms and PVR    Consider repeating cystoscopy in a few months to ensure resolution of the cystitis    Milagro Dimas MD MPH   of Urology    CC  Patient Care Team:  Joe Cardenas MD as PCP - General (Family Practice)  Kelsey Kenyon NP as MD (Nurse Practitioner)  Milagro Dimas MD as MD (Urology)  Laura Yuan RN as Registered Nurse (Urology)  Joe Cardenas MD as Referring Physician (Family Practice)  JOE CARDENAS

## 2018-09-27 NOTE — MR AVS SNAPSHOT
"              After Visit Summary   9/27/2018    Slim Mcbride    MRN: [id number]           Patient Information     Date Of Birth          1955        Visit Information        Provider Department      9/27/2018 2:00 PM Milagro Dimas MD [facility] Urology and [facility]        Today's Diagnoses     Personal history of urinary tract infection    -  1    History of suburethral sling procedure        Urge incontinence          Care Instructions    Please do the CT scan    Please complete the cephalexin please start the nitrofurantoin daily    Please take the mirabegron and trospium daily    Please return to see us in 6-8 weeks, sooner if needed    It was a pleasure meeting with you today.  Thank you for allowing me and my team the privilege of caring for you today.  YOU are the reason we are here, and I truly hope we provided you with the excellent service you deserve.  Please let us know if there is anything else we can do for you so that we can be sure you are leaving completely satisfied with your care experience.    AFTER YOUR CYSTOSCOPY        You have just completed a cystoscopy, or \"cysto\", which allowed your physician to learn more about your bladder (or to remove a stent placed after surgery). We suggest that you continue to avoid caffeine, fruit juice, and alcohol for the next 24 hours, however, you are encouraged to return to your normal activities.         A few things that are considered normal after your cystoscopy:     * Small amount of bleeding (or spotting) that clears within the next 24 hours     * Slight burning sensation with urination     * Sensation to of needing to avoid more frequently     * The feeling of \"air\" in your urine     * Mild discomfort that is relieved with Tylenol        Please contact our office promptly if you:     * Develop a fever above 101 degrees     * Are unable to urinate     * Develop bright red blood that does not stop     * Severe " pain or swelling         Please contact our office with any concerns or questions @[phone]          Follow-ups after your visit        Your next 10 appointments already scheduled     Sep 28, 2018  3:20 PM CDT   CT ABDOMEN PELVIS W/O & W CONTRAST with UCCT2   [facility] [place] [facility] ([facility])    [address] [id number] [phone]           How do I prepare for my exam? (Food and drink instructions) To prepare: Do not eat or drink for 2 hours before your exam. If you need to take medicine, you may take it with small sips of water. (We may ask you to take liquid medicine as well.)  How do I prepare for my exam? (Other instructions) Please arrive 30 minutes early for your CT.  Once in the department you might be asked to drink water 15-20 minutes prior to your exam.  If indicated you may be asked to drink an oral contrast in advance of your CT.  If this is the case, the imaging team will let you know or be in contact with you prior to your appointment  Patients over 70 or patients with diabetes or kidney problems: If you haven t had a blood test (creatinine test) within the last 30 days, the Cardiologist/Radiologist may require you to get this test prior to your exam.  If you have diabetes:  Continue to take your metformin medication on the day of your exam  What should I wear: Please wear loose clothing, such as a sweat suit or jogging clothes. Avoid snaps, zippers and other metal. We may ask you to undress and put on a hospital gown.  How long does the exam take: Most scans take less than 20 minutes.  What should I bring: Please bring any scans or X-rays taken at other hospitals, if similar tests were done. Also bring a list of your medicines, including vitamins, minerals and over-the-counter drugs. It is safest to leave personal items at home.  Do I need a : No  is needed.  What do I need to tell my doctor? Be sure to tell your  doctor: * If you have any allergies. * If there s any chance you are pregnant. * If you are breastfeeding.  What should I do after the exam: No restrictions, You may resume normal activities.  What is this test: A CT (computed tomography) scan is a series of pictures that allows us to look inside your body. The scanner creates images of the body in cross sections, much like slices of bread. This helps us see any problems more clearly. You may receive contrast (X-ray dye) before or during your scan. You will be asked to drink the contrast.  Who should I call with questions: If you have any questions, please call the Imaging Department where you will have your exam. Directions, parking instructions, and other information is available on our website, [email or web address].            Nov 08, 2018  8:00 AM CST   (Arrive by 7:45 AM)   Return Visit with Milagro Dimas MD   [facility] Urology and [facility] ([facility])    [address] [place] [address] [phone]            Dec 06, 2018 12:15 PM CST   (Arrive by 12:00 PM)   Return Visit with Milagro Dimas MD   [facility] Urology and [facility] ([facility])    [address] [place] [address] [phone]              Future tests that were ordered for you today     Open Future Orders        Priority Expected Expires Ordered    CT Urogram Routine  9/27/2019 9/27/2018            Who to contact     Please call your clinic at [phone] to:    Ask questions about your health    Make or cancel appointments    Discuss your medicines    Learn about your test results    Speak to your doctor            Additional Information About Your Visit        [employer] Information     [employer] gives you secure access to your electronic health record. If you see a primary care provider, you can also send messages to your  "care team and make appointments. If you have questions, please call your primary care clinic.  If you do not have a primary care provider, please call [phone] and they will assist you.      [employer] is an electronic gateway that provides easy, online access to your medical records. With [employer], you can request a clinic appointment, read your test results, renew a prescription or communicate with your care team.     To access your existing account, please contact your [facility] or call [phone] for assistance.        Care EveryWhere ID     This is your Care EveryWhere ID. This could be used by other organizations to access your [facility] medical records  [id number]        Your Vitals Were     Pulse Height BMI (Body Mass Index)             71 1.651 m (5' 5\") 29.17 kg/m2          Blood Pressure from Last 3 Encounters:   09/27/18 93/66   09/13/18 104/66   06/30/18 112/64    Weight from Last 3 Encounters:   09/27/18 79.5 kg (175 lb 4.8 oz)   09/13/18 77.6 kg (171 lb)   06/30/18 76.7 kg (169 lb)              We Performed the Following     CYSTOURETHROSCOPY          Today's Medication Changes          These changes are accurate as of 9/27/18  3:50 PM.  If you have any questions, ask your nurse or doctor.               Start taking these medicines.        Dose/Directions    mirabegron 25 MG 24 hr tablet   Commonly known as:  MYRBETRIQ   Used for:  Urge incontinence   Started by:  Milagro Dimas MD        Dose:  25 mg   Take 1 tablet (25 mg) by mouth daily   Quantity:  30 tablet   Refills:  3       nitroFURantoin (macrocrystal-monohydrate) 100 MG capsule   Commonly known as:  MACROBID   Used for:  Personal history of urinary tract infection   Started by:  Milagro Dimas MD        Dose:  100 mg   Take 1 capsule (100 mg) by mouth daily   Quantity:  30 capsule   Refills:  3         Stop taking these medicines if you haven't already. Please contact your care team if you " have questions.     trimethoprim 100 MG tablet   Commonly known as:  TRIMPEX   Stopped by:  Milagro Dimas See MD Demetrius                Where to get your medicines      These medications were sent to [facility] - [place], [place] - [address]  [address] [facility], [facility] [address]     Phone:  [phone]     mirabegron 25 MG 24 hr tablet    nitroFURantoin (macrocrystal-monohydrate) 100 MG capsule                Primary Care Provider Office Phone # Fax #    Joe Cardenas [phone]       [facility] [address] [place] [address]        Equal Access to Services     JIM PATEL : Hadii magui pritchard hadasho Soomaali, waaxda luqadaha, qaybta kaalmada adeegyada, rosanne montague. So [facility] [phone].    ATENCIÓN: Si habla español, tiene a azevedo disposición servicios gratuitos de asistencia lingüística. Llame al [phone].    We comply with applicable federal civil rights laws and [place] laws. We do not discriminate on the basis of race, color, national origin, age, disability, sex, sexual orientation, or gender identity.            Thank you!     Thank you for choosing [facility] UROLOGY AND [facility] FOR PROSTATE AND UROLOGIC CANCERS  for your care. Our goal is always to provide you with excellent care. Hearing back from our patients is one way we can continue to improve our services. Please take a few minutes to complete the written survey that you may receive in the mail after your visit with us. Thank you!             Your Updated Medication List - Protect others around you: Learn how to safely use, store and throw away your medicines at www.disposemymeds.org.          This list is accurate as of 9/27/18  3:50 PM.  Always use your most recent med list.                   Brand Name Dispense Instructions for use Diagnosis    aspirin 81 MG tablet      Take 81 mg by mouth every evening        calcium citrate-vitamin D 315-200 MG-UNIT Tabs per tablet     CITRACAL     Take 1 tablet by mouth daily        CENTRUM SILVER Chew      Take 1 tablet by mouth daily        cephALEXin 500 MG capsule    KEFLEX    10 capsule    Take 1 capsule (500 mg) by mouth 2 times daily for 5 days    Urinary tract infection       cyanocobalamin 1000 MCG Tabs      Take 1,000 mcg by mouth daily        diphenhydrAMINE 50 MG capsule    BENADRYL     Take 50 mg by mouth nightly as needed        escitalopram 10 MG tablet    LEXAPRO          fexofenadine 180 MG tablet    ALLEGRA     Take 180 mg by mouth daily        GABAPENTIN PO      Take by mouth 3 times daily        MAGNESIUM OXIDE PO      Take 400 mg by mouth daily        mirabegron 25 MG 24 hr tablet    MYRBETRIQ    30 tablet    Take 1 tablet (25 mg) by mouth daily    Urge incontinence       nitroFURantoin (macrocrystal-monohydrate) 100 MG capsule    MACROBID    30 capsule    Take 1 capsule (100 mg) by mouth daily    Personal history of urinary tract infection       omeprazole 20 MG CR capsule    priLOSEC          trospium 60 MG Cp24 24 hr capsule    SANCTURA XR    90 capsule    Take 1 capsule (60 mg) by mouth every morning    Mixed incontinence urge and stress (male)(female)

## 2018-09-27 NOTE — PROGRESS NOTES
"September 27, 2018    Return visit    Patient returns today for follow up.  On antibiotics for a UTI.  She states that she will be having to see a urologist at her local clinic next year per her PCP. She denies any changes in her health since last visit.    BP 93/66  Pulse 71  Ht 1.651 m (5' 5\")  Wt 79.5 kg (175 lb 4.8 oz)  BMI 29.17 kg/m2  She is comfortable, in no distress, non-labored breathing.  Abdomen is soft, non-tender, non-distended.  Normal external female genitalia.  Negative CST.  Pelvic exam is remarkable for mild myofascial tenderness of the pelvic floor, pelvic floor dysfunction    Cystoscopy Note: After informed consent was obtained patient was prepped and draped in the standard fashion.  The flexible cystoscope was inserted into a normal appearing urethral meatus.  The urothelium was carefully examined and there was scattered erythematous patches consistent with recent UTI.  There were no tumors, masses, stones, foreign bodies, or other urothelial abnormalities noted.  Bilateral ureteral orifices were noted in the normal orthotopic position and both effluxed clear urine.  The cystoscope was retroflexed and the bladder neck was unremarkable.  The urethra was carefully examined upon removing the cystoscope and was unremarkable.  Patient tolerated the procedure without complications noted.      A/P: 63 year old F with history of TVT, UTI, persistent urge incontinence    Switch to macrobid prophylaxis based on recent cultures.  Did discuss the rare side effect of pulmonary fibrosis    On reviewing her symptoms and medications at one point back in April she was doing well but since then unclear why she stopped the mirabegron.  At this time plan to restart the mirabegron in addition to the trospium.      Will reassess in 6-8 weeks symptoms and PVR    Consider repeating cystoscopy in a few months to ensure resolution of the cystitis    Milagro Dimas MD MPH   of Urology    CC  Patient " Care Team:  Joe Cardenas MD as PCP - General (Family Practice)  Kelsey Kenyon NP as MD (Nurse Practitioner)  Milagro Dimas MD as MD (Urology)  Laura Yuan RN as Registered Nurse (Urology)  Joe Cardenas MD as Referring Physician (Family Practice)  JOE CARDENAS

## 2018-09-28 ENCOUNTER — RADIANT APPOINTMENT (OUTPATIENT)
Dept: CT IMAGING | Facility: CLINIC | Age: 63
End: 2018-09-28
Attending: UROLOGY
Payer: COMMERCIAL

## 2018-09-28 DIAGNOSIS — Z87.440 PERSONAL HISTORY OF URINARY TRACT INFECTION: ICD-10-CM

## 2018-09-28 DIAGNOSIS — Z98.890 HISTORY OF SUBURETHRAL SLING PROCEDURE: ICD-10-CM

## 2018-09-28 LAB
CREAT BLD-MCNC: 1.1 MG/DL (ref 0.52–1.04)
GFR SERPL CREATININE-BSD FRML MDRD: 50 ML/MIN/1.7M2

## 2018-09-28 RX ORDER — IOPAMIDOL 755 MG/ML
105 INJECTION, SOLUTION INTRAVASCULAR ONCE
Status: COMPLETED | OUTPATIENT
Start: 2018-09-28 | End: 2018-09-28

## 2018-09-28 RX ADMIN — IOPAMIDOL 105 ML: 755 INJECTION, SOLUTION INTRAVASCULAR at 15:06

## 2018-09-28 NOTE — DISCHARGE INSTRUCTIONS

## 2018-10-29 ENCOUNTER — PRE VISIT (OUTPATIENT)
Dept: UROLOGY | Facility: CLINIC | Age: 63
End: 2018-10-29

## 2018-10-29 NOTE — TELEPHONE ENCOUNTER
Reason for visit: symptom check     Relevant information: persistent urge incontinence    Records/imaging/labs: all records available    Pt called: no need for a call    Rooming: pvr

## 2018-11-08 ENCOUNTER — OFFICE VISIT (OUTPATIENT)
Dept: UROLOGY | Facility: CLINIC | Age: 63
End: 2018-11-08
Payer: COMMERCIAL

## 2018-11-08 VITALS
BODY MASS INDEX: 29.52 KG/M2 | DIASTOLIC BLOOD PRESSURE: 72 MMHG | HEIGHT: 65 IN | SYSTOLIC BLOOD PRESSURE: 117 MMHG | HEART RATE: 84 BPM | WEIGHT: 177.2 LBS

## 2018-11-08 DIAGNOSIS — Z98.890 HISTORY OF SUBURETHRAL SLING PROCEDURE: ICD-10-CM

## 2018-11-08 DIAGNOSIS — Z87.440 PERSONAL HISTORY OF URINARY TRACT INFECTION: ICD-10-CM

## 2018-11-08 DIAGNOSIS — N39.46 MIXED INCONTINENCE URGE AND STRESS (MALE)(FEMALE): Primary | ICD-10-CM

## 2018-11-08 RX ORDER — MIRABEGRON 25 MG/1
25 TABLET, FILM COATED, EXTENDED RELEASE ORAL DAILY
Qty: 90 TABLET | Refills: 3 | Status: SHIPPED | OUTPATIENT
Start: 2018-11-08 | End: 2021-08-31

## 2018-11-08 ASSESSMENT — PAIN SCALES - GENERAL: PAINLEVEL: NO PAIN (0)

## 2018-11-08 NOTE — LETTER
"11/8/2018       RE: Slim Mcbride  [address] [facility] [address]     Dear Colleague,    Thank you for referring your patient, Slim Mcbride, to the [facility] UROLOGY AND INST FOR PROSTATE AND UROLOGIC CANCERS at [facility]. Please see a copy of my visit note below.    November 8, 2018    Return visit    Patient returns today for follow up.  States that the mirabegron is expensive but really helping.   Is also on the trospium.  No UTIs while on the macrobid prophylaxis.  She denies any changes in her health since last visit.    /72  Pulse 84  Ht 1.651 m (5' 5\")  Wt 80.4 kg (177 lb 3.2 oz)  BMI 29.49 kg/m2  She is comfortable, in no distress, non-labored breathing.       mL by bladder scan    A/P: 63 year old F with mixed urinary incontinence in setting of prior midurethral sling with urinary urgency incontinence controlled with medications    Continue mirabegron and trospium    3 months total of the nitrofurantoin and then stop    RTC 3 months, sooner if needed    15 minutes were spent with the patient today, > 50% in counseling and coordination of care    Milagro Dimas MD MPH   of Urology    CC  Patient Care Team:  Joe Cardenas MD as PCP - General (Family Practice)  Kelsey Kenyon NP as MD (Nurse Practitioner)  Milagro Dimas MD as MD (Urology)  Laura Yuan RN as Registered Nurse (Urology)  Joe Cardenas MD as Referring Physician (Family Practice)  SELF, REFERRED      "

## 2018-11-08 NOTE — PROGRESS NOTES
"November 8, 2018    Return visit    Patient returns today for follow up.  States that the mirabegron is expensive but really helping.   Is also on the trospium.  No UTIs while on the macrobid prophylaxis.  She denies any changes in her health since last visit.    /72  Pulse 84  Ht 1.651 m (5' 5\")  Wt 80.4 kg (177 lb 3.2 oz)  BMI 29.49 kg/m2  She is comfortable, in no distress, non-labored breathing.       mL by bladder scan    A/P: 63 year old F with mixed urinary incontinence in setting of prior midurethral sling with urinary urgency incontinence controlled with medications    Continue mirabegron and trospium    3 months total of the nitrofurantoin and then stop    RTC 3 months, sooner if needed    15 minutes were spent with the patient today, > 50% in counseling and coordination of care    Milagro Dimas MD MPH   of Urology    CC  Patient Care Team:  Joe Cardenas MD as PCP - General (Family Practice)  Kelsey Kenyon NP as MD (Nurse Practitioner)  Milagro Dimas MD as MD (Urology)  Laura Yuan, RN as Registered Nurse (Urology)  Joe Cardenas MD as Referring Physician (Family Practice)  SELF, REFERRED              "

## 2018-11-08 NOTE — PATIENT INSTRUCTIONS
Stop the nitrofurantoin at about the end of December, which is about 3 months    Continue the mirabegron and trospium    Return to see us 3 months, sooner if needed     It was a pleasure meeting with you today.  Thank you for allowing me and my team the privilege of caring for you today.  YOU are the reason we are here, and I truly hope we provided you with the excellent service you deserve.  Please let us know if there is anything else we can do for you so that we can be sure you are leaving completely satisfied with your care experience.

## 2018-11-08 NOTE — MR AVS SNAPSHOT
After Visit Summary   11/8/2018    Slim Mcbride    MRN: [id number]           Patient Information     Date Of Birth          1955        Visit Information        Provider Department      11/8/2018 8:00 AM Milagro Dimas MD [facility] and [facility]        Today's Diagnoses     Mixed incontinence urge and stress (male)(female)    -  1    History of suburethral sling procedure        Personal history of urinary tract infection          Care Instructions    Stop the nitrofurantoin at about the end of December, which is about 3 months    Continue the mirabegron and trospium    Return to see us 3 months, sooner if needed     It was a pleasure meeting with you today.  Thank you for allowing me and my team the privilege of caring for you today.  YOU are the reason we are here, and I truly hope we provided you with the excellent service you deserve.  Please let us know if there is anything else we can do for you so that we can be sure you are leaving completely satisfied with your care experience.            Follow-ups after your visit        Your next 10 appointments already scheduled     Dec 06, 2018 12:15 PM CST   (Arrive by 12:00 PM)   Return Visit with Milagro Dimas MD   [facility] and [facility] ([facility] [place])    [address] [place] [id number] [phone]              Who to contact     Please call your clinic at [phone] to:    Ask questions about your health    Make or cancel appointments    Discuss your medicines    Learn about your test results    Speak to your doctor            Additional Information About Your Visit        MyChart Information     [facility] gives you secure access to your electronic health record. If you see a primary care provider, you can also send messages to your care team and make appointments. If you have questions, please call your  "primary care clinic.  If you do not have a primary care provider, please call [phone] and they will assist you.      [email or web address] is an electronic gateway that provides easy, online access to your medical records. With [email or web address], you can request a clinic appointment, read your test results, renew a prescription or communicate with your care team.     To access your existing account, please contact your [facility] or call [phone] for assistance.        Care EveryWhere ID     This is your Care EveryWhere ID. This could be used by other organizations to access your [facility] medical records  [id number]        Your Vitals Were     Pulse Height BMI (Body Mass Index)             84 1.651 m (5' 5\") 29.49 kg/m2          Blood Pressure from Last 3 Encounters:   11/08/18 117/72   09/27/18 93/66   09/13/18 104/66    Weight from Last 3 Encounters:   11/08/18 80.4 kg (177 lb 3.2 oz)   09/27/18 79.5 kg (175 lb 4.8 oz)   09/13/18 77.6 kg (171 lb)              Today, you had the following     No orders found for display         Today's Medication Changes          These changes are accurate as of 11/8/18 11:59 PM.  If you have any questions, ask your nurse or doctor.               These medicines have changed or have updated prescriptions.        Dose/Directions    * mirabegron 25 MG 24 hr tablet   Commonly known as:  MYRBETRIQ   This may have changed:  Another medication with the same name was added. Make sure you understand how and when to take each.   Used for:  Urge incontinence   Changed by:  Milagro Dimas MD        Dose:  25 mg   Take 1 tablet (25 mg) by mouth daily   Quantity:  30 tablet   Refills:  3       * mirabegron 25 MG 24 hr tablet   Commonly known as:  MYRBETRIQ   This may have changed:  You were already taking a medication with the same name, and this prescription was added. Make sure you understand how and when to take each.   Used for:  Mixed incontinence urge and stress " (male)(female)   Changed by:  Milagro Dimas MD        Dose:  25 mg   Take 1 tablet (25 mg) by mouth daily   Quantity:  90 tablet   Refills:  3       * Notice:  This list has 2 medication(s) that are the same as other medications prescribed for you. Read the directions carefully, and ask your doctor or other care provider to review them with you.         Where to get your medicines      Some of these will need a paper prescription and others can be bought over the counter.  Ask your nurse if you have questions.     Bring a paper prescription for each of these medications     mirabegron 25 MG 24 hr tablet                Primary Care Provider Office Phone # Fax #    Joe Cardenas [phone]       [address] [address]        Equal Access to Services     FRANCES PATEL : Henry russo Soaisha, waaxda luqadaha, qaybta kaalmada adecrisyadarinel, rosanne brock . So [facility] [phone].    ATENCIÓN: Si habla español, tiene a azevedo disposición servicios gratuitos de asistencia lingüística. Llame al [phone].    We comply with applicable federal civil rights laws and [place] laws. We do not discriminate on the basis of race, color, national origin, age, disability, sex, sexual orientation, or gender identity.            Thank you!     Thank you for choosing [facility]OLOGY AND [facility]  for your care. Our goal is always to provide you with excellent care. Hearing back from our patients is one way we can continue to improve our services. Please take a few minutes to complete the written survey that you may receive in the mail after your visit with us. Thank you!             Your Updated Medication List - Protect others around you: Learn how to safely use, store and throw away your medicines at www.disposemymeds.org.          This list is accurate as of 11/8/18 11:59 PM.  Always use your most recent med  list.                   Brand Name Dispense Instructions for use Diagnosis    aspirin 81 MG tablet      Take 81 mg by mouth every evening        calcium citrate-vitamin D 315-200 MG-UNIT Tabs per tablet    CITRACAL     Take 1 tablet by mouth daily        CENTRUM SILVER Chew      Take 1 tablet by mouth daily        cyanocobalamin 1000 MCG Tabs      Take 1,000 mcg by mouth daily        diphenhydrAMINE 50 MG capsule    BENADRYL     Take 50 mg by mouth nightly as needed        escitalopram 10 MG tablet    LEXAPRO          fexofenadine 180 MG tablet    ALLEGRA     Take 180 mg by mouth daily        GABAPENTIN PO      Take by mouth 3 times daily        MAGNESIUM OXIDE PO      Take 400 mg by mouth daily        * mirabegron 25 MG 24 hr tablet    MYRBETRIQ    30 tablet    Take 1 tablet (25 mg) by mouth daily    Urge incontinence       * mirabegron 25 MG 24 hr tablet    MYRBETRIQ    90 tablet    Take 1 tablet (25 mg) by mouth daily    Mixed incontinence urge and stress (male)(female)       nitroFURantoin (macrocrystal-monohydrate) 100 MG capsule    MACROBID    30 capsule    Take 1 capsule (100 mg) by mouth daily    Personal history of urinary tract infection       omeprazole 20 MG CR capsule    priLOSEC          trospium 60 MG Cp24 24 hr capsule    SANCTURA XR    90 capsule    Take 1 capsule (60 mg) by mouth every morning    Mixed incontinence urge and stress (male)(female)       * Notice:  This list has 2 medication(s) that are the same as other medications prescribed for you. Read the directions carefully, and ask your doctor or other care provider to review them with you.

## 2018-11-08 NOTE — NURSING NOTE
"Chief Complaint   Patient presents with     RECHECK     symptom check continued urgency incontinence       Blood pressure 117/72, pulse 84, height 1.651 m (5' 5\"), weight 80.4 kg (177 lb 3.2 oz), not currently breastfeeding. Body mass index is 29.49 kg/(m^2).    Patient Active Problem List   Diagnosis     CARDIOVASCULAR SCREENING; LDL GOAL LESS THAN 160     Mixed incontinence urge and stress (male)(female)     Somatic dysfunction of pelvis region     Allergic rhinitis     B12 deficiency     Recurrent ventral incisional hernia       Allergies   Allergen Reactions     Nkda [No Known Drug Allergies]      No Clinical Screening - See Comments Rash and Other (See Comments)     Pt received medication for over active bladder which caused itching- doesn;t remember the name of it.  Pt received medication for over active bladder which caused itching- doesn;t remember the name of it.       Current Outpatient Prescriptions   Medication Sig Dispense Refill     aspirin 81 MG tablet Take 81 mg by mouth every evening        calcium citrate-vitamin D (CITRACAL) 315-200 MG-UNIT TABS per tablet Take 1 tablet by mouth daily        cyanocobalamin 1000 MCG TABS Take 1,000 mcg by mouth daily        diphenhydrAMINE (BENADRYL) 50 MG capsule Take 50 mg by mouth nightly as needed        escitalopram (LEXAPRO) 10 MG tablet        fexofenadine (ALLEGRA) 180 MG tablet Take 180 mg by mouth daily        GABAPENTIN PO Take by mouth 3 times daily       MAGNESIUM OXIDE PO Take 400 mg by mouth daily       mirabegron (MYRBETRIQ) 25 MG 24 hr tablet Take 1 tablet (25 mg) by mouth daily 30 tablet 3     Multiple Vitamins-Minerals (CENTRUM SILVER) CHEW Take 1 tablet by mouth daily        nitroFURantoin, macrocrystal-monohydrate, (MACROBID) 100 MG capsule Take 1 capsule (100 mg) by mouth daily 30 capsule 3     omeprazole (PRILOSEC) 20 MG CR capsule        trospium (SANCTURA XR) 60 MG CP24 24 hr capsule Take 1 capsule (60 mg) by mouth every morning 90 capsule " 3       Social History   Substance Use Topics     Smoking status: Former Smoker     Packs/day: 1.00     Years: 8.00     Types: Cigarettes     Quit date: 1/2/1978     Smokeless tobacco: Never Used     Alcohol use Yes      Comment: 2-3 drinks one to two times a month.         MITESH Mclaughlin  11/8/2018  7:44 AM

## 2018-11-13 PROBLEM — Z98.890 HISTORY OF SUBURETHRAL SLING PROCEDURE: Status: ACTIVE | Noted: 2018-11-13

## 2018-11-13 PROBLEM — Z87.440 PERSONAL HISTORY OF URINARY TRACT INFECTION: Status: ACTIVE | Noted: 2018-11-13

## 2018-11-26 ENCOUNTER — PRE VISIT (OUTPATIENT)
Dept: UROLOGY | Facility: CLINIC | Age: 63
End: 2018-11-26

## 2018-12-17 DIAGNOSIS — N39.0 RECURRENT UTI: ICD-10-CM

## 2018-12-18 RX ORDER — TRIMETHOPRIM 100 MG/1
TABLET ORAL
Qty: 30 TABLET | Refills: 3 | OUTPATIENT
Start: 2018-12-18

## 2018-12-18 NOTE — TELEPHONE ENCOUNTER
The source prescription was discontinued on 9/27/2018 by Milagro Dimas MD for the following reason: Alternate therapy in place.

## 2019-01-21 DIAGNOSIS — Z87.440 PERSONAL HISTORY OF URINARY TRACT INFECTION: ICD-10-CM

## 2019-01-22 RX ORDER — NITROFURANTOIN 25; 75 MG/1; MG/1
100 CAPSULE ORAL DAILY
Qty: 30 CAPSULE | Refills: 0 | Status: SHIPPED
Start: 2019-01-22 | End: 2021-08-31

## 2019-01-22 NOTE — TELEPHONE ENCOUNTER
Message to [facility] pharmacy in [place] stating that the prescription was denied.  Per Dr. Dimas's last clinic note she wanted the patient to take Macrobid for 3 months and after that stop.      Myra Esparza

## 2019-01-22 NOTE — TELEPHONE ENCOUNTER
" NITROFURANTOIN MONO 100MG CAP  Last Written Prescription Date:  9/27/2018  Last Fill Quantity: 30,   # refills: 3  Last Office Visit : 11/8/2018  Future Office visit:  2/14/2019    Routing refill request to provider for review/approval because:  Drug not on refill protocol. 11/8/2018 plan per note  \"3 months total of the nitrofurantoin and then stop\"             "

## 2019-10-02 ENCOUNTER — HEALTH MAINTENANCE LETTER (OUTPATIENT)
Age: 64
End: 2019-10-02

## 2019-12-15 ENCOUNTER — HEALTH MAINTENANCE LETTER (OUTPATIENT)
Age: 64
End: 2019-12-15

## 2021-01-15 ENCOUNTER — HEALTH MAINTENANCE LETTER (OUTPATIENT)
Age: 66
End: 2021-01-15

## 2021-03-11 NOTE — ANESTHESIA PREPROCEDURE EVALUATION
Anesthesia Evaluation     . Pt has had prior anesthetic. Type: General and MAC    No history of anesthetic complications          ROS/MED HX    ENT/Pulmonary:     (+)tobacco use, Past use , . .   (-) recent URI   Neurologic:     (+)other neuro History of post-concussive syndrome.  Has associated headaches.      Cardiovascular:     (+) ----. Taking blood thinners Pt has received instructions: Instructions Given to patient: Patient instructed to hold ASA 81 mg in preparation for surgery.   . . . :. .      (-) MENJIVAR and orthopnea/PND   METS/Exercise Tolerance:  4 - Raking leaves, gardening   Hematologic:  - neg hematologic  ROS       Musculoskeletal:  - neg musculoskeletal ROS       GI/Hepatic:     (+) Other GI/Hepatic History of esophageal stricture requiring dilation.  History of gastric bypass surgery.        Renal/Genitourinary:     (+) Other Renal/ Genitourinary, Recurrent UTIs with stress urinary incontinence.        Endo:  - neg endo ROS       Psychiatric:     (+) psychiatric history depression      Infectious Disease:  - neg infectious disease ROS       Malignancy:      - no malignancy   Other:    - neg other ROS                 Physical Exam  Normal systems: dental    Airway   Mallampati: III  TM distance: >3 FB  Neck ROM: full    Dental   (+) caps    Cardiovascular   Rhythm and rate: regular and normal  (-) no peripheral edema and no murmur    Pulmonary    breath sounds clear to auscultation    Other findings: 2/28/18: WBC:  7.6; Hgb: 13.4; Hct: 42.9; Plt: 289  2/28/18: Na: 136; K: 4.8; Cl: 103; Glu: 80; BUN: 19; Cr: 1.09; Ca: 8.8           PAC Discussion and Assessment    ASA Classification: 2  Case is suitable for: ASC  Anesthetic techniques and relevant risks discussed: GA and GA with regional block for post-op pain control  Invasive monitoring and risk discussed: No  Types:   Possibility and Risk of blood transfusion discussed: No  NPO instructions given:   Additional anesthetic preparation and risks  discussed:   Needs early admission to pre-op area:   Other:     PAC Resident/NP Anesthesia Assessment:  Slim Mcbride is a 62 year old female scheduled to undergo Cystoscopy with Retropubic Midurethral Sling on 3/12/18 with Dr. Milagro Dimas.    She has the following specific operative considerations:   1.  Increased risk of postoperative nausea/vomiting: Recommend use of antiemetic agents in the perioperative period.    2.  History of esophageal strictures requiring dilation: Recommend that extra caution be exercised should a gastric tube need to be placed during the procedure.  3.  CBC, BMP, UA today.    Revised Cardiac Risk Index: 0.9% risk of major adverse cardiac event.  ASHLYN risk: Low  PONV risk score= 3.  (If > 2, anti-emetic intervention is recommended.)  Anesthesia considerations: Refer to PAC assessment in the anesthesia records.        Reviewed and Signed by PAC Mid-Level Provider/Resident  Mid-Level Provider/Resident: Katarina Ospina CNP  Date: 2/28/18  Time:     Attending Anesthesiologist Anesthesia Assessment:        Anesthesiologist:   Date:   Time:   Pass/Fail:   Disposition:     PAC Pharmacist Assessment:        Pharmacist:   Date:   Time:                           .   Include Pregnancy/Lactation Warning?: No Erythromycin Pregnancy And Lactation Text: This medication is Pregnancy Category B and is considered safe during pregnancy. It is also excreted in breast milk. Bactrim Pregnancy And Lactation Text: This medication is Pregnancy Category D and is known to cause fetal risk.  It is also excreted in breast milk. Spironolactone Counseling: Patient advised regarding risks of diarrhea, abdominal pain, hyperkalemia, birth defects (for female patients), liver toxicity and renal toxicity. The patient may need blood work to monitor liver and kidney function and potassium levels while on therapy. The patient verbalized understanding of the proper use and possible adverse effects of spironolactone.  All of the patient's questions and concerns were addressed. Doxycycline Counseling:  Patient counseled regarding possible photosensitivity and increased risk for sunburn.  Patient instructed to avoid sunlight, if possible.  When exposed to sunlight, patients should wear protective clothing, sunglasses, and sunscreen.  The patient was instructed to call the office immediately if the following severe adverse effects occur:  hearing changes, easy bruising/bleeding, severe headache, or vision changes.  The patient verbalized understanding of the proper use and possible adverse effects of doxycycline.  All of the patient's questions and concerns were addressed. High Dose Vitamin A Counseling: Side effects reviewed, pt to contact office should one occur. Topical Retinoid counseling:  Patient advised to apply a pea-sized amount only at bedtime and wait 30 minutes after washing their face before applying.  If too drying, patient may add a non-comedogenic moisturizer. The patient verbalized understanding of the proper use and possible adverse effects of retinoids.  All of the patient's questions and concerns were addressed. Birth Control Pills Pregnancy And Lactation Text: This medication should be avoided if pregnant and for the first 30 days post-partum. Topical Clindamycin Pregnancy And Lactation Text: This medication is Pregnancy Category B and is considered safe during pregnancy. It is unknown if it is excreted in breast milk. Topical Retinoid Pregnancy And Lactation Text: This medication is Pregnancy Category C. It is unknown if this medication is excreted in breast milk. Isotretinoin Pregnancy And Lactation Text: This medication is Pregnancy Category X and is considered extremely dangerous during pregnancy. It is unknown if it is excreted in breast milk. Dapsone Pregnancy And Lactation Text: This medication is Pregnancy Category C and is not considered safe during pregnancy or breast feeding. Tetracycline Counseling: Patient counseled regarding possible photosensitivity and increased risk for sunburn.  Patient instructed to avoid sunlight, if possible.  When exposed to sunlight, patients should wear protective clothing, sunglasses, and sunscreen.  The patient was instructed to call the office immediately if the following severe adverse effects occur:  hearing changes, easy bruising/bleeding, severe headache, or vision changes.  The patient verbalized understanding of the proper use and possible adverse effects of tetracycline.  All of the patient's questions and concerns were addressed. Patient understands to avoid pregnancy while on therapy due to potential birth defects. Minocycline Pregnancy And Lactation Text: This medication is Pregnancy Category D and not consider safe during pregnancy. It is also excreted in breast milk. Erythromycin Counseling:  I discussed with the patient the risks of erythromycin including but not limited to GI upset, allergic reaction, drug rash, diarrhea, increase in liver enzymes, and yeast infections. Topical Clindamycin Counseling: Patient counseled that this medication may cause skin irritation or allergic reactions.  In the event of skin irritation, the patient was advised to reduce the amount of the drug applied or use it less frequently.   The patient verbalized understanding of the proper use and possible adverse effects of clindamycin.  All of the patient's questions and concerns were addressed. Tazorac Counseling:  Patient advised that medication is irritating and drying.  Patient may need to apply sparingly and wash off after an hour before eventually leaving it on overnight.  The patient verbalized understanding of the proper use and possible adverse effects of tazorac.  All of the patient's questions and concerns were addressed. Bactrim Counseling:  I discussed with the patient the risks of sulfa antibiotics including but not limited to GI upset, allergic reaction, drug rash, diarrhea, dizziness, photosensitivity, and yeast infections.  Rarely, more serious reactions can occur including but not limited to aplastic anemia, agranulocytosis, methemoglobinemia, blood dyscrasias, liver or kidney failure, lung infiltrates or desquamative/blistering drug rashes. Tazorac Pregnancy And Lactation Text: This medication is not safe during pregnancy. It is unknown if this medication is excreted in breast milk. Benzoyl Peroxide Pregnancy And Lactation Text: This medication is Pregnancy Category C. It is unknown if benzoyl peroxide is excreted in breast milk. High Dose Vitamin A Pregnancy And Lactation Text: High dose vitamin A therapy is contraindicated during pregnancy and breast feeding. Detail Level: Detailed Minocycline Counseling: Patient advised regarding possible photosensitivity and discoloration of the teeth, skin, lips, tongue and gums.  Patient instructed to avoid sunlight, if possible.  When exposed to sunlight, patients should wear protective clothing, sunglasses, and sunscreen.  The patient was instructed to call the office immediately if the following severe adverse effects occur:  hearing changes, easy bruising/bleeding, severe headache, or vision changes.  The patient verbalized understanding of the proper use and possible adverse effects of minocycline.  All of the patient's questions and concerns were addressed. Topical Sulfur Applications Pregnancy And Lactation Text: This medication is Pregnancy Category C and has an unknown safety profile during pregnancy. It is unknown if this topical medication is excreted in breast milk. Benzoyl Peroxide Counseling: Patient counseled that medicine may cause skin irritation and bleach clothing.  In the event of skin irritation, the patient was advised to reduce the amount of the drug applied or use it less frequently.   The patient verbalized understanding of the proper use and possible adverse effects of benzoyl peroxide.  All of the patient's questions and concerns were addressed. Dapsone Counseling: I discussed with the patient the risks of dapsone including but not limited to hemolytic anemia, agranulocytosis, rashes, methemoglobinemia, kidney failure, peripheral neuropathy, headaches, GI upset, and liver toxicity.  Patients who start dapsone require monitoring including baseline LFTs and weekly CBCs for the first month, then every month thereafter.  The patient verbalized understanding of the proper use and possible adverse effects of dapsone.  All of the patient's questions and concerns were addressed. Doxycycline Pregnancy And Lactation Text: This medication is Pregnancy Category D and not consider safe during pregnancy. It is also excreted in breast milk but is considered safe for shorter treatment courses. Azithromycin Counseling:  I discussed with the patient the risks of azithromycin including but not limited to GI upset, allergic reaction, drug rash, diarrhea, and yeast infections. Birth Control Pills Counseling: Birth Control Pill Counseling: I discussed with the patient the potential side effects of OCPs including but not limited to increased risk of stroke, heart attack, thrombophlebitis, deep venous thrombosis, hepatic adenomas, breast changes, GI upset, headaches, and depression.  The patient verbalized understanding of the proper use and possible adverse effects of OCPs. All of the patient's questions and concerns were addressed. Isotretinoin Counseling: Patient should get monthly blood tests, not donate blood, not drive at night if vision affected, not share medication, and not undergo elective surgery for 6 months after tx completed. Side effects reviewed, pt to contact office should one occur. Topical Sulfur Applications Counseling: Topical Sulfur Counseling: Patient counseled that this medication may cause skin irritation or allergic reactions.  In the event of skin irritation, the patient was advised to reduce the amount of the drug applied or use it less frequently.   The patient verbalized understanding of the proper use and possible adverse effects of topical sulfur application.  All of the patient's questions and concerns were addressed. Azithromycin Pregnancy And Lactation Text: This medication is considered safe during pregnancy and is also secreted in breast milk. Spironolactone Pregnancy And Lactation Text: This medication can cause feminization of the male fetus and should be avoided during pregnancy. The active metabolite is also found in breast milk. Sarecycline Counseling: Patient advised regarding possible photosensitivity and discoloration of the teeth, skin, lips, tongue and gums.  Patient instructed to avoid sunlight, if possible.  When exposed to sunlight, patients should wear protective clothing, sunglasses, and sunscreen.  The patient was instructed to call the office immediately if the following severe adverse effects occur:  hearing changes, easy bruising/bleeding, severe headache, or vision changes.  The patient verbalized understanding of the proper use and possible adverse effects of sarecycline.  All of the patient's questions and concerns were addressed.

## 2021-04-26 ENCOUNTER — VIRTUAL VISIT (OUTPATIENT)
Dept: PSYCHOLOGY | Facility: CLINIC | Age: 66
End: 2021-04-26
Payer: COMMERCIAL

## 2021-04-26 DIAGNOSIS — F43.23 ADJUSTMENT DISORDER WITH MIXED ANXIETY AND DEPRESSED MOOD: Primary | ICD-10-CM

## 2021-04-26 PROCEDURE — 90791 PSYCH DIAGNOSTIC EVALUATION: CPT | Mod: 95 | Performed by: SOCIAL WORKER

## 2021-04-26 ASSESSMENT — COLUMBIA-SUICIDE SEVERITY RATING SCALE - C-SSRS
2. HAVE YOU ACTUALLY HAD ANY THOUGHTS OF KILLING YOURSELF?: NO
6. HAVE YOU EVER DONE ANYTHING, STARTED TO DO ANYTHING, OR PREPARED TO DO ANYTHING TO END YOUR LIFE?: NO
4. HAVE YOU HAD THESE THOUGHTS AND HAD SOME INTENTION OF ACTING ON THEM?: NO
TOTAL  NUMBER OF ABORTED OR SELF INTERRUPTED ATTEMPTS PAST LIFETIME: NO
TOTAL  NUMBER OF INTERRUPTED ATTEMPTS PAST 3 MONTHS: NO
ATTEMPT PAST THREE MONTHS: NO
TOTAL  NUMBER OF INTERRUPTED ATTEMPTS LIFETIME: NO
2. HAVE YOU ACTUALLY HAD ANY THOUGHTS OF KILLING YOURSELF LIFETIME?: NO
6. HAVE YOU EVER DONE ANYTHING, STARTED TO DO ANYTHING, OR PREPARED TO DO ANYTHING TO END YOUR LIFE?: NO
5. HAVE YOU STARTED TO WORK OUT OR WORKED OUT THE DETAILS OF HOW TO KILL YOURSELF? DO YOU INTEND TO CARRY OUT THIS PLAN?: NO
1. IN THE PAST MONTH, HAVE YOU WISHED YOU WERE DEAD OR WISHED YOU COULD GO TO SLEEP AND NOT WAKE UP?: NO
TOTAL  NUMBER OF ABORTED OR SELF INTERRUPTED ATTEMPTS PAST 3 MONTHS: NO
4. HAVE YOU HAD THESE THOUGHTS AND HAD SOME INTENTION OF ACTING ON THEM?: NO
5. HAVE YOU STARTED TO WORK OUT OR WORKED OUT THE DETAILS OF HOW TO KILL YOURSELF? DO YOU INTEND TO CARRY OUT THIS PLAN?: NO
3. HAVE YOU BEEN THINKING ABOUT HOW YOU MIGHT KILL YOURSELF?: NO
ATTEMPT LIFETIME: NO
1. IN THE PAST MONTH, HAVE YOU WISHED YOU WERE DEAD OR WISHED YOU COULD GO TO SLEEP AND NOT WAKE UP?: NO

## 2021-04-26 ASSESSMENT — ANXIETY QUESTIONNAIRES
5. BEING SO RESTLESS THAT IT IS HARD TO SIT STILL: NOT AT ALL
6. BECOMING EASILY ANNOYED OR IRRITABLE: MORE THAN HALF THE DAYS
GAD7 TOTAL SCORE: 15
1. FEELING NERVOUS, ANXIOUS, OR ON EDGE: MORE THAN HALF THE DAYS
2. NOT BEING ABLE TO STOP OR CONTROL WORRYING: NEARLY EVERY DAY
3. WORRYING TOO MUCH ABOUT DIFFERENT THINGS: NEARLY EVERY DAY
4. TROUBLE RELAXING: MORE THAN HALF THE DAYS
7. FEELING AFRAID AS IF SOMETHING AWFUL MIGHT HAPPEN: NEARLY EVERY DAY

## 2021-04-26 ASSESSMENT — PATIENT HEALTH QUESTIONNAIRE - PHQ9: SUM OF ALL RESPONSES TO PHQ QUESTIONS 1-9: 16
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
## 2021-04-27 ASSESSMENT — ANXIETY QUESTIONNAIRES: GAD7 TOTAL SCORE: 15

## 2021-05-05 ENCOUNTER — VIRTUAL VISIT (OUTPATIENT)
Dept: PSYCHOLOGY | Facility: CLINIC | Age: 66
End: 2021-05-05
Payer: COMMERCIAL

## 2021-05-05 DIAGNOSIS — F43.23 ADJUSTMENT DISORDER WITH MIXED ANXIETY AND DEPRESSED MOOD: Primary | ICD-10-CM

## 2021-05-05 PROCEDURE — 90834 PSYTX W PT 45 MINUTES: CPT | Mod: 95 | Performed by: SOCIAL WORKER

## 2021-05-05 ASSESSMENT — ANXIETY QUESTIONNAIRES
3. WORRYING TOO MUCH ABOUT DIFFERENT THINGS: NEARLY EVERY DAY
7. FEELING AFRAID AS IF SOMETHING AWFUL MIGHT HAPPEN: SEVERAL DAYS
2. NOT BEING ABLE TO STOP OR CONTROL WORRYING: NEARLY EVERY DAY
1. FEELING NERVOUS, ANXIOUS, OR ON EDGE: SEVERAL DAYS
4. TROUBLE RELAXING: SEVERAL DAYS
5. BEING SO RESTLESS THAT IT IS HARD TO SIT STILL: NOT AT ALL
6. BECOMING EASILY ANNOYED OR IRRITABLE: SEVERAL DAYS
GAD7 TOTAL SCORE: 10

## 2021-05-05 ASSESSMENT — PATIENT HEALTH QUESTIONNAIRE - PHQ9: SUM OF ALL RESPONSES TO PHQ QUESTIONS 1-9: 9

## 2021-05-05 NOTE — PROGRESS NOTES
Progress Note    Patient Name: Slim Mcbride  Date: 2021         Service Type: Individual      Session Start Time: 1010 Started late due to patient having difficulty  Session End Time: 1153     Session Length: 38-52    Session #: 1    Attendees: Client    Service Modality:  Video Visit:      Provider verified identity through the following two step process.  Patient provided:  Patient     Telemedicine Visit: The patient's condition can be safely assessed and treated via synchronous audio and visual telemedicine encounter.      Reason for Telemedicine Visit: Services only offered telehealth    Originating Site (Patient Location): Patient's home    Distant Site (Provider Location): Provider Remote Setting    Consent:  The patient/guardian has verbally consented to: the potential risks and benefits of telemedicine (video visit) versus in person care; bill my insurance or make self-payment for services provided; and responsibility for payment of non-covered services.     Patient would like the video invitation sent by:  Send to e-mail at: [email or web address]    Mode of Communication:  Video Conference via [employer]well    As the provider I attest to compliance with applicable laws and regulations related to telemedicine.     Treatment Plan Last Reviewed: 2021 due 2021  PHQ-9 / TONE-7 :9/10    DATA  Interactive Complexity: No  Crisis: No       Progress Since Last Session (Related to Symptoms / Goals / Homework):   Symptoms: Improving pHQ-9 TONE-7    Homework: Completed in session      Episode of Care Goals: Minimal progress - CONTEMPLATION (Considering change and yet undecided); Intervened by assessing the negative and positive thinking (ambivalence) about behavior change     Current / Ongoing Stressors and Concerns:    family conflict (foster daughter possibility of commitment and son meth use and homelessness), loss and relationships.     Treatment Objective(s) Addressed in  This Session:     Patient will demonstrate and report a level of anxiety/depression that can be managed at a lower level of care.  Absence of persistent anxious/depressed mood and report of reduced frequency and intensity of worry and absence of persistent anxious mood to acceptable levels, no panic attacks, report subjective comfort with rumination for a period of 90 days, within 6 months as clinically observed and by patient self-report.  Patient will and family will demonstrate/report improved family dynamics that can be safely managed in a lower level of care. Absence of persistent conflict in family relationships and reduction in family conflict to level of comfort and satisfaction of family members as measured by client report for a period of at least 30 days within 6 months as clinically observed and by patient self-report.       Intervention:   Motivational Interviewing    MI Intervention: Co-Developed Goal: anxiety/depression and family conflict/communication, Expressed Empathy/Understanding, Supported Autonomy, Collaboration, Evocation, Permission to raise concern or advise and Open-ended questions     Change Talk Expressed by the Patient: Desire to change Ability to change Reasons to change Need to change Committment to change    Provider Response to Change Talk: E - Evoked more info from patient about behavior change, A - Affirmed patient's thoughts, decisions, or attempts at behavior change, R - Reflected patient's change talk and S - Summarized patient's change talk statements          ASSESSMENT: Current Emotional / Mental Status (status of significant symptoms):   Risk status (Self / Other harm or suicidal ideation)   Patient denies current fears or concerns for personal safety.   Patient denies current or recent suicidal ideation or behaviors.   Patient denies current or recent homicidal ideation or behaviors.   Patient denies current or recent self injurious behavior or ideation.   Patient denies  other safety concerns.   Patient reports there has been no change in risk factors since their last session.     Patient reports there has been no change in protective factors since their last session.     Recommended that patient call 911 or go to the local ED should there be a change in any of these risk factors.     Appearance:   Appropriate    Eye Contact:   Fair    Psychomotor Behavior: Restless    Attitude:   Cooperative    Orientation:   All   Speech    Rate / Production: Emotional Talkative    Volume:  Normal    Mood:    Anxious  Depressed    Affect:    Worrisome    Thought Content:  Clear    Thought Form:  Coherent    Insight:    Fair      Medication Review:   No changes to current psychiatric medication(s)     Medication Compliance:   Yes     Changes in Health Issues:   None reported     Chemical Use Review:   Substance Use: Chemical use reviewed, no active concerns identified      Tobacco Use: No current tobacco use.      Diagnosis:  1. Adjustment disorder with mixed anxiety and depressed mood        Collateral Reports Completed:   Not Applicable    PLAN: (Patient Tasks / Therapist Tasks / Other)  Patient to speak to sw about daughters discharge planning  Patient to set boundaries with son        Keren Tracey, [facility] 5/5/2021                                                         ______________________________________________________________________    Treatment Plan    Patient's Name: Slim Mcbride  YOB: 1955    Date: 5/5/2021    DSM5 Diagnoses: Adjustment Disorders  309.28 (F43.23) With mixed anxiety and depressed mood  Psychosocial / Contextual Factors: family conflict (foster daughter possibility of commitment and son meth use and homelessness), loss and relationships.  WHODAS: 23    Referral / Collaboration:  Referral to another professional/service is not indicated at this time..    Anticipated number of session or this episode of care: 16      MeasurableTreatment Goal(s) related  to diagnosis / functional impairment(s)  Goal 1: Patient will report absence of persistent anxiety/depression mood and report of reduced frequency and intensity of worry and absence of persistent anxious/depression mood to acceptable levels, no panic attacks, report subjective comfort with rumination for a period of 90 days. Within 6 months as clinically observed and by patient self-report    I will know I've met my goal when I don't worry so much.      Objective #A (Patient Action)    Patient will demonstrate and report a level of anxiety/depression that can be managed at a lower level of care.  Absence of persistent anxious/depressed mood and report of reduced frequency and intensity of worry and absence of persistent anxious mood to acceptable levels, no panic attacks, report subjective comfort with rumination for a period of 90 days, within 6 months as clinically observed and by patient self-report.  Status: New - Date: 5/5/2021     Intervention(s)  Therapist will provide individual therapy to identify triggers to anxiety/depression, gain feedback on helpful coping tools and thought-reframing techniques, and identify preferred way of being. Tx to include discuss current stressors and interpersonal conflicts and how to cope with these, coaching, diagnostic testing, referral for medication as indicated, use prescribed medication, cognitive restructuring, interpersonal, family therapy, supportive therapy.        Goal 2: Patient will report absence of persistent conflict in family relationships and reduction in family conflict to level of comfort and satisfaction of family members as measured by client report for a period of at least 30 days within 6 months as clinically observed and by patient self-report    I will know I've met my goal when I can get upset without raising my voice not get upset.      Objective #A (Patient Action)    Status: New - Date: 5/5/2021     Patient will and family will demonstrate/report  improved family dynamics that can be safely managed in a lower level of care. Absence of persistent conflict in family relationships and reduction in family conflict to level of comfort and satisfaction of family members as measured by client report for a period of at least 30 days within 6 months as clinically observed and by patient self-report.    Intervention(s)  Therapist will provide individual therapy to process stressors within family dynamics, identify areas within her control, and identify preferred way of being within family relationships.  Tx to discuss current stressors and interpersonal conflicts and how to cope with these, coaching, diagnostic testing, referral for medication as indicated, use prescribed medication, cognitive restructuring, interpersonal family therapy, supportive therapy services.        Patient has reviewed and agreed to the above plan.      Keren Tracey, [facility]  May 5, 2021

## 2021-05-06 ASSESSMENT — ANXIETY QUESTIONNAIRES: GAD7 TOTAL SCORE: 10

## 2021-07-19 ENCOUNTER — VIRTUAL VISIT (OUTPATIENT)
Dept: PSYCHOLOGY | Facility: CLINIC | Age: 66
End: 2021-07-19
Payer: COMMERCIAL

## 2021-07-19 DIAGNOSIS — F43.23 ADJUSTMENT DISORDER WITH MIXED ANXIETY AND DEPRESSED MOOD: Primary | ICD-10-CM

## 2021-07-19 PROCEDURE — 90834 PSYTX W PT 45 MINUTES: CPT | Mod: 95 | Performed by: SOCIAL WORKER

## 2021-07-19 ASSESSMENT — PATIENT HEALTH QUESTIONNAIRE - PHQ9: SUM OF ALL RESPONSES TO PHQ QUESTIONS 1-9: 6

## 2021-07-19 ASSESSMENT — ANXIETY QUESTIONNAIRES
1. FEELING NERVOUS, ANXIOUS, OR ON EDGE: NEARLY EVERY DAY
GAD7 TOTAL SCORE: 13
6. BECOMING EASILY ANNOYED OR IRRITABLE: MORE THAN HALF THE DAYS
4. TROUBLE RELAXING: NOT AT ALL
3. WORRYING TOO MUCH ABOUT DIFFERENT THINGS: NEARLY EVERY DAY
7. FEELING AFRAID AS IF SOMETHING AWFUL MIGHT HAPPEN: MORE THAN HALF THE DAYS
2. NOT BEING ABLE TO STOP OR CONTROL WORRYING: NEARLY EVERY DAY
5. BEING SO RESTLESS THAT IT IS HARD TO SIT STILL: NOT AT ALL

## 2021-07-19 NOTE — PROGRESS NOTES
Progress Note    Patient Name: Slim cMbride  Date: 2021         Service Type: Individual      Session Start Time: 1503 Session End Time: 154     Session Length: 38-52    Session #: 2    Attendees: Client    Service Modality:  Video Visit:      Provider verified identity through the following two step process.  Patient provided:  Patient  and Patient is known previously to provider    Telemedicine Visit: The patient's condition can be safely assessed and treated via synchronous audio and visual telemedicine encounter.      Reason for Telemedicine Visit: Services only offered telehealth    Originating Site (Patient Location): Patient's home    Distant Site (Provider Location): Provider Remote Setting    Consent:  The patient/guardian has verbally consented to: the potential risks and benefits of telemedicine (video visit) versus in person care; bill my insurance or make self-payment for services provided; and responsibility for payment of non-covered services.     Patient would like the video invitation sent by:  Send to e-mail at: [email or web address]    Mode of Communication:  Video Conference via Amwell    As the provider I attest to compliance with applicable laws and regulations related to telemedicine.     Treatment Plan Last Reviewed: 2021 due 2021  PHQ-9 / TONE-7 :     DATA  Interactive Complexity: No  Crisis: No       Progress Since Last Session (Related to Symptoms / Goals / Homework):   Symptoms: Worsening tone-7    Homework: Partially completed      Episode of Care Goals: Minimal progress - CONTEMPLATION (Considering change and yet undecided); Intervened by assessing the negative and positive thinking (ambivalence) about behavior change     Current / Ongoing Stressors and Concerns:    family conflict (foster daughter possibility of commitment and son meth use and homelessness), loss and relationships.     Treatment Objective(s) Addressed in This  Session:     Patient will demonstrate and report a level of anxiety/depression that can be managed at a lower level of care.  Absence of persistent anxious/depressed mood and report of reduced frequency and intensity of worry and absence of persistent anxious mood to acceptable levels, no panic attacks, report subjective comfort with rumination for a period of 90 days, within 6 months as clinically observed and by patient self-report.  Patient will and family will demonstrate/report improved family dynamics that can be safely managed in a lower level of care. Absence of persistent conflict in family relationships and reduction in family conflict to level of comfort and satisfaction of family members as measured by client report for a period of at least 30 days within 6 months as clinically observed and by patient self-report.       Intervention:   Therapist met with patient to review goals and interventions. Therapist utilized reflected listening as patient gave brief reflection of week. Patient reported an increase in anxiety after a discussion with her son and frustrations with other family members. Therapist supported patient as she processed and validated patient. Therapist suggested patient to look at what she can control and offered patient alternative perspective with resources.   Patient presented with an anxious affect. Patient was engaged in session and open to feedback. Patient reported no safety concerns.         ASSESSMENT: Current Emotional / Mental Status (status of significant symptoms):   Risk status (Self / Other harm or suicidal ideation)   Patient denies current fears or concerns for personal safety.   Patient denies current or recent suicidal ideation or behaviors.   Patient denies current or recent homicidal ideation or behaviors.   Patient denies current or recent self injurious behavior or ideation.   Patient denies other safety concerns.   Patient reports there has been no change in risk  factors since their last session.     Patient reports there has been no change in protective factors since their last session.     Recommended that patient call 911 or go to the local ED should there be a change in any of these risk factors.     Appearance:   Appropriate    Eye Contact:   Fair    Psychomotor Behavior: Restless    Attitude:   Cooperative    Orientation:   All   Speech    Rate / Production: Emotional Talkative    Volume:  Normal    Mood:    Anxious  Depressed    Affect:    Worrisome    Thought Content:  Clear    Thought Form:  Coherent    Insight:    Fair      Medication Review:   No changes to current psychiatric medication(s)     Medication Compliance:   Yes     Changes in Health Issues:   None reported     Chemical Use Review:   Substance Use: Chemical use reviewed, no active concerns identified      Tobacco Use: No current tobacco use.      Diagnosis:  1. Adjustment disorder with mixed anxiety and depressed mood        Collateral Reports Completed:   Not Applicable    PLAN: (Patient Tasks / Therapist Tasks / Other)     Therapist suggested patient to look at what she can control and offered patient alternative perspective with resources.       Keren Tracey, [facility] 7/19/2021                                                         ______________________________________________________________________    Treatment Plan    Patient's Name: Slim Mcbride  YOB: 1955    Date: 5/5/2021    DSM5 Diagnoses: Adjustment Disorders  309.28 (F43.23) With mixed anxiety and depressed mood  Psychosocial / Contextual Factors: family conflict (foster daughter possibility of commitment and son meth use and homelessness), loss and relationships.  WHODAS: 23    Referral / Collaboration:  Referral to another professional/service is not indicated at this time..    Anticipated number of session or this episode of care: 16      MeasurableTreatment Goal(s) related to diagnosis / functional impairment(s)  Goal  1: Patient will report absence of persistent anxiety/depression mood and report of reduced frequency and intensity of worry and absence of persistent anxious/depression mood to acceptable levels, no panic attacks, report subjective comfort with rumination for a period of 90 days. Within 6 months as clinically observed and by patient self-report    I will know I've met my goal when I don't worry so much.      Objective #A (Patient Action)    Patient will demonstrate and report a level of anxiety/depression that can be managed at a lower level of care.  Absence of persistent anxious/depressed mood and report of reduced frequency and intensity of worry and absence of persistent anxious mood to acceptable levels, no panic attacks, report subjective comfort with rumination for a period of 90 days, within 6 months as clinically observed and by patient self-report.  Status: New - Date: 5/5/2021     Intervention(s)  Therapist will provide individual therapy to identify triggers to anxiety/depression, gain feedback on helpful coping tools and thought-reframing techniques, and identify preferred way of being. Tx to include discuss current stressors and interpersonal conflicts and how to cope with these, coaching, diagnostic testing, referral for medication as indicated, use prescribed medication, cognitive restructuring, interpersonal, family therapy, supportive therapy.        Goal 2: Patient will report absence of persistent conflict in family relationships and reduction in family conflict to level of comfort and satisfaction of family members as measured by client report for a period of at least 30 days within 6 months as clinically observed and by patient self-report    I will know I've met my goal when I can get upset without raising my voice not get upset.      Objective #A (Patient Action)    Status: New - Date: 5/5/2021     Patient will and family will demonstrate/report improved family dynamics that can be safely  managed in a lower level of care. Absence of persistent conflict in family relationships and reduction in family conflict to level of comfort and satisfaction of family members as measured by client report for a period of at least 30 days within 6 months as clinically observed and by patient self-report.    Intervention(s)  Therapist will provide individual therapy to process stressors within family dynamics, identify areas within her control, and identify preferred way of being within family relationships.  Tx to discuss current stressors and interpersonal conflicts and how to cope with these, coaching, diagnostic testing, referral for medication as indicated, use prescribed medication, cognitive restructuring, interpersonal family therapy, supportive therapy services.        Patient has reviewed and agreed to the above plan.      Keren Tracey, [facility]  May 5, 2021

## 2021-07-20 ASSESSMENT — ANXIETY QUESTIONNAIRES: GAD7 TOTAL SCORE: 13

## 2021-08-24 ENCOUNTER — VIRTUAL VISIT (OUTPATIENT)
Dept: PSYCHOLOGY | Facility: CLINIC | Age: 66
End: 2021-08-24
Payer: COMMERCIAL

## 2021-08-24 DIAGNOSIS — F43.23 ADJUSTMENT DISORDER WITH MIXED ANXIETY AND DEPRESSED MOOD: Primary | ICD-10-CM

## 2021-08-24 PROCEDURE — 90832 PSYTX W PT 30 MINUTES: CPT | Mod: 95 | Performed by: SOCIAL WORKER

## 2021-08-24 NOTE — PROGRESS NOTES
Progress Note    Patient Name: Slim Mcbride  Date: 2021         Service Type: Individual      Session Start Time: 914 switched to phone  Session End Time: 939     Session Length: 38-52    Session #: 3    Attendees: Client    Service Modality:  Video Visit:      Provider verified identity through the following two step process.  Patient provided:  Patient  and Patient is known previously to provider    Telemedicine Visit: The patient's condition can be safely assessed and treated via synchronous audio and visual telemedicine encounter.      Reason for Telemedicine Visit: Services only offered telehealth    Originating Site (Patient Location): Patient's home    Distant Site (Provider Location): Provider Remote Setting    Consent:  The patient/guardian has verbally consented to: the potential risks and benefits of telemedicine (video visit) versus in person care; bill my insurance or make self-payment for services provided; and responsibility for payment of non-covered services.     Patient would like the video invitation sent by:  Send to e-mail at: [email or web address]    Mode of Communication:  Video Conference via [employer]    As the provider I attest to compliance with applicable laws and regulations related to telemedicine.     Treatment Plan Last Reviewed: 2021 due 2021  PHQ-9 / TONE-7 :  last session    DATA  Interactive Complexity: No  Crisis: No       Progress Since Last Session (Related to Symptoms / Goals / Homework):   Symptoms: Worsening tone-7    Homework: Partially completed      Episode of Care Goals: Minimal progress - CONTEMPLATION (Considering change and yet undecided); Intervened by assessing the negative and positive thinking (ambivalence) about behavior change     Current / Ongoing Stressors and Concerns:    family conflict (foster daughter possibility of commitment and son meth use and homelessness), loss and  relationships.     Treatment Objective(s) Addressed in This Session:     Patient will demonstrate and report a level of anxiety/depression that can be managed at a lower level of care.  Absence of persistent anxious/depressed mood and report of reduced frequency and intensity of worry and absence of persistent anxious mood to acceptable levels, no panic attacks, report subjective comfort with rumination for a period of 90 days, within 6 months as clinically observed and by patient self-report.  Patient will and family will demonstrate/report improved family dynamics that can be safely managed in a lower level of care. Absence of persistent conflict in family relationships and reduction in family conflict to level of comfort and satisfaction of family members as measured by client report for a period of at least 30 days within 6 months as clinically observed and by patient self-report.       Intervention:   Therapist met with patient to review goals and interventions. Therapist utilized reflected listening as patient gave brief reflection of week. Patient reported her daughter went back into inpatient and her son being arrested. Therapist supported patient as she processed validated patient's emotions and offered patient hope. Therapist educated patient on levels of care and encouraged patient to speak to her daughter's  and let them know what she has been working on and discharge planning. Ask for a step down of PHP and options for RTC.   Patient presented with an anxious affect. Patient was engaged in session and open to feedback. Patient reported no safety concerns.         ASSESSMENT: Current Emotional / Mental Status (status of significant symptoms):   Risk status (Self / Other harm or suicidal ideation)   Patient denies current fears or concerns for personal safety.   Patient denies current or recent suicidal ideation or behaviors.   Patient denies current or recent homicidal ideation or  behaviors.   Patient denies current or recent self injurious behavior or ideation.   Patient denies other safety concerns.   Patient reports there has been no change in risk factors since their last session.     Patient reports there has been no change in protective factors since their last session.     Recommended that patient call 911 or go to the local ED should there be a change in any of these risk factors.     Appearance:   Appropriate    Eye Contact:   Fair    Psychomotor Behavior: Restless    Attitude:   Cooperative    Orientation:   All   Speech    Rate / Production: Emotional Talkative    Volume:  Normal    Mood:    Anxious  Depressed    Affect:    Worrisome    Thought Content:  Clear    Thought Form:  Coherent    Insight:    Fair      Medication Review:   No changes to current psychiatric medication(s)     Medication Compliance:   Yes     Changes in Health Issues:   None reported     Chemical Use Review:   Substance Use: Chemical use reviewed, no active concerns identified      Tobacco Use: No current tobacco use.      Diagnosis:  1. Adjustment disorder with mixed anxiety and depressed mood        Collateral Reports Completed:   Not Applicable    PLAN: (Patient Tasks / Therapist Tasks / Other)    Therapist educated patient on levels of care and encouraged patient to speak to her daughter's  and let them know what she has been working on and discharge planning. Ask for a step down of PHP and options for RTC.   Update shahriar Tracey, LICSW 8/24/2021                                                         ______________________________________________________________________    Treatment Plan    Patient's Name: Slim Mcbride  YOB: 1955    Date: 8/24/2021    DSM5 Diagnoses: Adjustment Disorders  309.28 (F43.23) With mixed anxiety and depressed mood  Psychosocial / Contextual Factors: family conflict (foster daughter possibility of commitment and son meth use and  homelessness), loss and relationships.  WHODAS: 23    Referral / Collaboration:  Referral to another professional/service is not indicated at this time..    Anticipated number of session or this episode of care: 16      MeasurableTreatment Goal(s) related to diagnosis / functional impairment(s)  Goal 1: Patient will report absence of persistent anxiety/depression mood and report of reduced frequency and intensity of worry and absence of persistent anxious/depression mood to acceptable levels, no panic attacks, report subjective comfort with rumination for a period of 90 days. Within 6 months as clinically observed and by patient self-report    I will know I've met my goal when I don't worry so much.      Objective #A (Patient Action)    Patient will demonstrate and report a level of anxiety/depression that can be managed at a lower level of care.  Absence of persistent anxious/depressed mood and report of reduced frequency and intensity of worry and absence of persistent anxious mood to acceptable levels, no panic attacks, report subjective comfort with rumination for a period of 90 days, within 6 months as clinically observed and by patient self-report.  Status: Continued - Date(s): 8/24/2021     Intervention(s)  Therapist will provide individual therapy to identify triggers to anxiety/depression, gain feedback on helpful coping tools and thought-reframing techniques, and identify preferred way of being. Tx to include discuss current stressors and interpersonal conflicts and how to cope with these, coaching, diagnostic testing, referral for medication as indicated, use prescribed medication, cognitive restructuring, interpersonal, family therapy, supportive therapy.        Goal 2: Patient will report absence of persistent conflict in family relationships and reduction in family conflict to level of comfort and satisfaction of family members as measured by client report for a period of at least 30 days within 6  months as clinically observed and by patient self-report    I will know I've met my goal when I can get upset without raising my voice not get upset.      Objective #A (Patient Action)    Status: Continued - Date(s): 8/24/2021     Patient will and family will demonstrate/report improved family dynamics that can be safely managed in a lower level of care. Absence of persistent conflict in family relationships and reduction in family conflict to level of comfort and satisfaction of family members as measured by client report for a period of at least 30 days within 6 months as clinically observed and by patient self-report.    Intervention(s)  Therapist will provide individual therapy to process stressors within family dynamics, identify areas within her control, and identify preferred way of being within family relationships.  Tx to discuss current stressors and interpersonal conflicts and how to cope with these, coaching, diagnostic testing, referral for medication as indicated, use prescribed medication, cognitive restructuring, interpersonal family therapy, supportive therapy services.        Patient has reviewed and agreed to the above plan.      Keren Tracey, [facility]  8/24/2021

## 2021-08-31 ENCOUNTER — OFFICE VISIT (OUTPATIENT)
Dept: URGENT CARE | Facility: URGENT CARE | Age: 66
End: 2021-08-31
Payer: COMMERCIAL

## 2021-08-31 VITALS
BODY MASS INDEX: 23.03 KG/M2 | OXYGEN SATURATION: 96 % | TEMPERATURE: 98.8 F | DIASTOLIC BLOOD PRESSURE: 77 MMHG | WEIGHT: 138.4 LBS | SYSTOLIC BLOOD PRESSURE: 129 MMHG | HEART RATE: 73 BPM

## 2021-08-31 DIAGNOSIS — K14.8 TONGUE LESION: Primary | ICD-10-CM

## 2021-08-31 PROCEDURE — 99203 OFFICE O/P NEW LOW 30 MIN: CPT | Performed by: INTERNAL MEDICINE

## 2021-08-31 NOTE — PROGRESS NOTES
SUBJECTIVE:  Slim Mcbride is an 66 year old female who presents for concern for thrush.  Has white patch on middle of tongue. Noticed it about four days ago. Initially thought it might be smear from one of her meds.  Then yesterday noticed an orange or peach peel consistency on the tongue.  Not hurt when eat. Hasn't noticed any other spots. No recent abx.  Hasn't had similar sxs in the past. Not hurt in throat when swallow.  Not having acid reflux sxs currently. No skin rashes.  No new meds or med changes.  Non-smoker.  Drinks alcohol about a six pack over weekends.    PMH:   has a past medical history of History of alcoholism (H), History of esophageal stricture, History of gastric bypass, History of hepatitis, History of pyelonephritis, History of tobacco use, sepsis, Major depression, Post concussive syndrome, and Recurrent UTI (urinary tract infection).  Patient Active Problem List   Diagnosis     CARDIOVASCULAR SCREENING; LDL GOAL LESS THAN 160     Mixed incontinence urge and stress (male)(female)     Somatic dysfunction of pelvis region     Allergic rhinitis     B12 deficiency     Recurrent ventral incisional hernia     History of suburethral sling procedure     Personal history of urinary tract infection     Social History     Socioeconomic History     Marital status:      Spouse name: None     Number of children: None     Years of education: None     Highest education level: None   Occupational History     None   Tobacco Use     Smoking status: Former Smoker     Packs/day: 1.00     Years: 8.00     Pack years: 8.00     Types: Cigarettes     Quit date: 1978     Years since quittin.6     Smokeless tobacco: Never Used   Substance and Sexual Activity     Alcohol use: Yes     Comment: 2-3 drinks one to two times a month.       Drug use: No     Sexual activity: Yes     Partners: Male     Birth control/protection: Post-menopausal, Female Surgical   Other Topics Concern     Parent/sibling w/ CABG, MI  or angioplasty before 65F 55M? Not Asked   Social History Narrative     None     Social Determinants of Health     Financial Resource Strain:      Difficulty of Paying Living Expenses:    Food Insecurity:      Worried About Running Out of Food in the Last Year:      Ran Out of Food in the Last Year:    Transportation Needs:      Lack of Transportation (Medical):      Lack of Transportation (Non-Medical):    Physical Activity:      Days of Exercise per Week:      Minutes of Exercise per Session:    Stress:      Feeling of Stress :    Social Connections:      Frequency of Communication with Friends and Family:      Frequency of Social Gatherings with Friends and Family:      Attends Bahai Services:      Active Member of Clubs or Organizations:      Attends Club or Organization Meetings:      Marital Status:    Intimate Partner Violence:      Fear of Current or Ex-Partner:      Emotionally Abused:      Physically Abused:      Sexually Abused:      Family History   Problem Relation Age of Onset     Diabetes Father      Cardiovascular Father         Pacemaker     Breast Cancer Maternal Grandmother      Myocardial Infarction Paternal Grandmother      Heart Disease Paternal Grandfather      Myocardial Infarction Paternal Grandfather      Cerebrovascular Disease Brother      Thyroid Disease Daughter      Cerebrovascular Disease Sister 30        Multiple     Lung Cancer Sister      Other - See Comments Sister         Fibromyalgia     Glaucoma Mother      LUNG DISEASE Mother         Lung nodules     Osteoarthritis Mother      Brain Tumor Sister      No Known Problems Brother      Other - See Comments Brother          at age 1 year.     No Known Problems Sister        ALLERGIES:  Nkda [no known drug allergies] and No clinical screening - see comments    Current Outpatient Medications   Medication     aspirin 81 MG tablet     calcium citrate-vitamin D (CITRACAL) 315-200 MG-UNIT TABS per tablet     cyanocobalamin 1000  MCG TABS     escitalopram (LEXAPRO) 10 MG tablet     fexofenadine (ALLEGRA) 180 MG tablet     mirabegron (MYRBETRIQ) 25 MG 24 hr tablet     Multiple Vitamins-Minerals (CENTRUM SILVER) CHEW     omeprazole (PRILOSEC) 20 MG CR capsule     diphenhydrAMINE (BENADRYL) 50 MG capsule     MAGNESIUM OXIDE PO     trospium (SANCTURA XR) 60 MG CP24 24 hr capsule     No current facility-administered medications for this visit.         ROS:  ROS is done and is negative for general/constitutional, eye, ENT, Respiratory, cardiovascular, GI, , Skin, musculoskeletal except as noted elsewhere.  All other review of systems negative except as noted elsewhere.      OBJECTIVE:  /77   Pulse 73   Temp 98.8  F (37.1  C) (Tympanic)   Wt 62.8 kg (138 lb 6.4 oz)   SpO2 96%   BMI 23.03 kg/m    GENERAL APPEARANCE: Alert, in no acute distress  EYES: normal  EARS: negative  NOSE:normal  OROPHARYNX: mid portion of tongue with slightly raised light tan lesion which is non-tender, qchsqn0x4 cm.  No other oral lesions or plaques noted.  NECK:No adenopathy,masses or thyromegaly  RESP: normal and clear to auscultation  CV:regular rate and rhythm and no murmurs, clicks, or gallops  ABDOMEN: Abdomen soft, non-tender. BS normal. No masses, organomegaly  SKIN: no ulcers, lesions or rash  MUSCULOSKELETAL:Musculoskeletal normal      RESULTS  No results found for any visits on 08/31/21..  No results found for this or any previous visit (from the past 48 hour(s)).    ASSESSMENT/PLAN:    ASSESSMENT / PLAN:  (K14.8) Tongue lesion  (primary encounter diagnosis)  Comment: appearance and sxs not c/w thrush.  Consider leukoplakia, mass, or other growth.  Needs to see ENT for further evaluation.    Plan: Otolaryngology Referral        I reviewed expected course, and signs of concern.  Follow up with ENT within 2 day(s) or sooner if worsens in any way.  Reviewed red flag symptoms and is to go to the ER if experiences any of these.      PPE worn: mask and  shield    See [facility] for orders, medications, letters, patient instructions    Katherine Rahman M.D.

## 2021-09-03 ENCOUNTER — OFFICE VISIT (OUTPATIENT)
Dept: OTOLARYNGOLOGY | Facility: CLINIC | Age: 66
End: 2021-09-03
Payer: COMMERCIAL

## 2021-09-03 VITALS — SYSTOLIC BLOOD PRESSURE: 112 MMHG | DIASTOLIC BLOOD PRESSURE: 67 MMHG | OXYGEN SATURATION: 97 % | HEART RATE: 69 BPM

## 2021-09-03 DIAGNOSIS — K14.2 MEDIAN RHOMBOID GLOSSITIS: Primary | ICD-10-CM

## 2021-09-03 PROCEDURE — 99203 OFFICE O/P NEW LOW 30 MIN: CPT | Performed by: OTOLARYNGOLOGY

## 2021-09-03 RX ORDER — LOPERAMIDE HCL 2 MG
2 CAPSULE ORAL 4 TIMES DAILY PRN
COMMUNITY

## 2021-09-03 RX ORDER — TRAZODONE HYDROCHLORIDE 100 MG/1
100 TABLET ORAL AT BEDTIME
COMMUNITY

## 2021-09-03 RX ORDER — ARIPIPRAZOLE 5 MG/1
5 TABLET ORAL DAILY
COMMUNITY

## 2021-09-03 RX ORDER — HYDROXYZINE HYDROCHLORIDE 25 MG/1
25 TABLET, FILM COATED ORAL 3 TIMES DAILY PRN
COMMUNITY

## 2021-09-03 RX ORDER — GINSENG 100 MG
CAPSULE ORAL
COMMUNITY
End: 2023-02-28

## 2021-09-03 ASSESSMENT — ENCOUNTER SYMPTOMS
COUGH: 0
HEARTBURN: 0
HEMOPTYSIS: 0
SORE THROAT: 0
STRIDOR: 0
BRUISES/BLEEDS EASILY: 0
TINGLING: 0
BLURRED VISION: 0
SINUS PAIN: 0
SPUTUM PRODUCTION: 0
PHOTOPHOBIA: 0
NAUSEA: 0
CONSTITUTIONAL NEGATIVE: 1
VOMITING: 0
DOUBLE VISION: 0
TREMORS: 0
DIZZINESS: 0
HEADACHES: 0

## 2021-09-03 NOTE — PROGRESS NOTES
HPI    This is a 66 year old patient who has been having a tongue lesion; noticed a week ago. Denies any dysphagia, odynophagia, hoarseness, allergies or heartburn. She is not a smoker. She is not on any long term antibiotics, chemotherapeutics, radiation other than antidepressives.    ENT Problem List  Allergic rhinitis    CARDIOVASCULAR SCREENING; LDL GOAL LESS THAN 160  Mixed incontinence urge and stress (male)(female)  Somatic dysfunction of pelvis region  B12 deficiency  Recurrent ventral incisional hernia  History of suburethral sling procedure  Personal history of urinary tract infection    Medications       ARIPiprazole (ABILIFY) 5 MG tablet  aspirin 81 MG tablet  calcium citrate-vitamin D (CITRACAL) 315-200 MG-UNIT TABS per tablet  cyanocobalamin 1000 MCG TABS  escitalopram (LEXAPRO) 10 MG tablet  fexofenadine (ALLEGRA) 180 MG tablet  hydrOXYzine (ATARAX) 25 MG tablet  loperamide (IMODIUM) 2 MG capsule  mirabegron (MYRBETRIQ) 25 MG 24 hr tablet  Multiple Vitamins-Minerals (CENTRUM SILVER) CHEW  omeprazole (PRILOSEC) 20 MG CR capsule  traZODone (DESYREL) 100 MG tablet  zinc 50 MG TABS  diphenhydrAMINE (BENADRYL) 50 MG capsule  MAGNESIUM OXIDE PO  trospium (SANCTURA XR) 60 MG CP24 24 hr capsule         Review of Systems   Constitutional: Negative.    HENT: Negative for congestion, ear discharge, ear pain, hearing loss, nosebleeds, sinus pain, sore throat and tinnitus.    Eyes: Negative for blurred vision, double vision and photophobia.   Respiratory: Negative for cough, hemoptysis, sputum production and stridor.    Gastrointestinal: Negative for heartburn, nausea and vomiting.   Skin: Negative.    Neurological: Negative for dizziness, tingling, tremors and headaches.   Endo/Heme/Allergies: Negative for environmental allergies. Does not bruise/bleed easily.         Physical Exam  Vitals reviewed.   Constitutional:       Appearance: Normal appearance.   HENT:      Head: Normocephalic and atraumatic.      Right  Ear: Tympanic membrane, ear canal and external ear normal. No middle ear effusion. There is no impacted cerumen.      Left Ear: Tympanic membrane, ear canal and external ear normal.  No middle ear effusion. There is no impacted cerumen.      Nose: Nose normal. No septal deviation, laceration, mucosal edema, congestion or rhinorrhea.      Right Turbinates: Not enlarged or swollen.      Left Turbinates: Not enlarged or swollen.      Mouth/Throat:      Mouth: Mucous membranes are moist.      Pharynx: Oropharynx is clear. Uvula midline.   Eyes:      Extraocular Movements: Extraocular movements intact.      Pupils: Pupils are equal, round, and reactive to light.   Neurological:      Mental Status: She is alert.       Median rhomboid glossitis with fungal growth.      A/P  This pleasant patient is having median rhomboid glossitis with likely fungal involvement. I will encourage her to use a soft brush x2 and swish and spit baking soda a tea spoon in a cup of water x2 and see her in the f/u. If the problem continues, I will put her on Fluconazole antifungal to eradicate it.

## 2021-09-03 NOTE — NURSING NOTE
lSim Mcbride's goals for this visit include:   Chief Complaint   Patient presents with     Consult     Lesion on tongue for 5 days, started as white patchy area, now beige color, texture changes at night. Seen in  8-31-21       She requests these members of her care team be copied on today's visit information: NO    PCP: Joe Cardenas    Referring Provider:  No referring provider defined for this encounter.    /67   Pulse 69   SpO2 97%     Do you need any medication refills at today's visit? NO

## 2021-09-03 NOTE — LETTER
9/3/2021         RE: Slim Mcbride  [address] [facility] [address]        Dear Colleague,    Thank you for referring your patient, Slim Mcbride, to the [facility]. Please see a copy of my visit note below.    HPI    This is a 66 year old patient who has been having a tongue lesion; noticed a week ago. Denies any dysphagia, odynophagia, hoarseness, allergies or heartburn. She is not a smoker. She is not on any long term antibiotics, chemotherapeutics, radiation other than antidepressives.    ENT Problem List  Allergic rhinitis    CARDIOVASCULAR SCREENING; LDL GOAL LESS THAN 160  Mixed incontinence urge and stress (male)(female)  Somatic dysfunction of pelvis region  B12 deficiency  Recurrent ventral incisional hernia  History of suburethral sling procedure  Personal history of urinary tract infection    Medications       ARIPiprazole (ABILIFY) 5 MG tablet  aspirin 81 MG tablet  calcium citrate-vitamin D (CITRACAL) 315-200 MG-UNIT TABS per tablet  cyanocobalamin 1000 MCG TABS  escitalopram (LEXAPRO) 10 MG tablet  fexofenadine (ALLEGRA) 180 MG tablet  hydrOXYzine (ATARAX) 25 MG tablet  loperamide (IMODIUM) 2 MG capsule  mirabegron (MYRBETRIQ) 25 MG 24 hr tablet  Multiple Vitamins-Minerals (CENTRUM SILVER) CHEW  omeprazole (PRILOSEC) 20 MG CR capsule  traZODone (DESYREL) 100 MG tablet  zinc 50 MG TABS  diphenhydrAMINE (BENADRYL) 50 MG capsule  MAGNESIUM OXIDE PO  trospium (SANCTURA XR) 60 MG CP24 24 hr capsule         Review of Systems   Constitutional: Negative.    HENT: Negative for congestion, ear discharge, ear pain, hearing loss, nosebleeds, sinus pain, sore throat and tinnitus.    Eyes: Negative for blurred vision, double vision and photophobia.   Respiratory: Negative for cough, hemoptysis, sputum production and stridor.    Gastrointestinal: Negative for heartburn, nausea and vomiting.   Skin: Negative.    Neurological: Negative for dizziness, tingling, tremors and  headaches.   Endo/Heme/Allergies: Negative for environmental allergies. Does not bruise/bleed easily.         Physical Exam  Vitals reviewed.   Constitutional:       Appearance: Normal appearance.   HENT:      Head: Normocephalic and atraumatic.      Right Ear: Tympanic membrane, ear canal and external ear normal. No middle ear effusion. There is no impacted cerumen.      Left Ear: Tympanic membrane, ear canal and external ear normal.  No middle ear effusion. There is no impacted cerumen.      Nose: Nose normal. No septal deviation, laceration, mucosal edema, congestion or rhinorrhea.      Right Turbinates: Not enlarged or swollen.      Left Turbinates: Not enlarged or swollen.      Mouth/Throat:      Mouth: Mucous membranes are moist.      Pharynx: Oropharynx is clear. Uvula midline.   Eyes:      Extraocular Movements: Extraocular movements intact.      Pupils: Pupils are equal, round, and reactive to light.   Neurological:      Mental Status: She is alert.       Median rhomboid glossitis with fungal growth.      A/P  This pleasant patient is having median rhomboid glossitis with likely fungal involvement. I will encourage her to use a soft brush x2 and swish and spit baking soda a tea spoon in a cup of water x2 and see her in the f/u. If the problem continues, I will put her on Fluconazole antifungal to eradicate it.       Again, thank you for allowing me to participate in the care of your patient.        Sincerely,        Nandini Storm MD

## 2021-09-04 ENCOUNTER — HEALTH MAINTENANCE LETTER (OUTPATIENT)
Age: 66
End: 2021-09-04

## 2021-11-01 ENCOUNTER — VIRTUAL VISIT (OUTPATIENT)
Dept: PSYCHOLOGY | Facility: CLINIC | Age: 66
End: 2021-11-01
Payer: COMMERCIAL

## 2021-11-01 DIAGNOSIS — F43.23 ADJUSTMENT DISORDER WITH MIXED ANXIETY AND DEPRESSED MOOD: Primary | ICD-10-CM

## 2021-11-01 PROCEDURE — 90834 PSYTX W PT 45 MINUTES: CPT | Mod: 95 | Performed by: SOCIAL WORKER

## 2021-11-01 ASSESSMENT — ANXIETY QUESTIONNAIRES
GAD7 TOTAL SCORE: 14
1. FEELING NERVOUS, ANXIOUS, OR ON EDGE: MORE THAN HALF THE DAYS
5. BEING SO RESTLESS THAT IT IS HARD TO SIT STILL: MORE THAN HALF THE DAYS
7. FEELING AFRAID AS IF SOMETHING AWFUL MIGHT HAPPEN: MORE THAN HALF THE DAYS
4. TROUBLE RELAXING: SEVERAL DAYS
3. WORRYING TOO MUCH ABOUT DIFFERENT THINGS: MORE THAN HALF THE DAYS
2. NOT BEING ABLE TO STOP OR CONTROL WORRYING: NEARLY EVERY DAY
6. BECOMING EASILY ANNOYED OR IRRITABLE: MORE THAN HALF THE DAYS

## 2021-11-01 ASSESSMENT — PATIENT HEALTH QUESTIONNAIRE - PHQ9: SUM OF ALL RESPONSES TO PHQ QUESTIONS 1-9: 7

## 2021-11-01 NOTE — PROGRESS NOTES
Progress Note    Patient Name: Slim Mcbride  Date: 2021         Service Type: Individual      Session Start Time: 907 Session End Time: 949     Session Length: 38-52    Session #: 4    Attendees: Client    Service Modality:  Video Visit:      Provider verified identity through the following two step process.  Patient provided:  Patient  and Patient is known previously to provider    Telemedicine Visit: The patient's condition can be safely assessed and treated via synchronous audio and visual telemedicine encounter.      Reason for Telemedicine Visit: Services only offered telehealth    Originating Site (Patient Location): Patient's home    Distant Site (Provider Location): Provider Remote Setting    Consent:  The patient/guardian has verbally consented to: the potential risks and benefits of telemedicine (video visit) versus in person care; bill my insurance or make self-payment for services provided; and responsibility for payment of non-covered services.     Patient would like the video invitation sent by:  Send to e-mail at: [email or web address]    Mode of Communication:  Video Conference via [employer]    As the provider I attest to compliance with applicable laws and regulations related to telemedicine.     Treatment Plan Last Reviewed: 2021 due 2021  PHQ-9 / TONE-7 :     DATA  Interactive Complexity: No  Crisis: No       Progress Since Last Session (Related to Symptoms / Goals / Homework):   Symptoms: Worsening phq-9 tone-7    Homework: Partially completed      Episode of Care Goals: Minimal progress - CONTEMPLATION (Considering change and yet undecided); Intervened by assessing the negative and positive thinking (ambivalence) about behavior change     Current / Ongoing Stressors and Concerns:    family conflict (foster daughter possibility of commitment and son meth use and homelessness), loss and relationships.     Treatment Objective(s) Addressed  in This Session:     Patient will demonstrate and report a level of anxiety/depression that can be managed at a lower level of care.  Absence of persistent anxious/depressed mood and report of reduced frequency and intensity of worry and absence of persistent anxious mood to acceptable levels, no panic attacks, report subjective comfort with rumination for a period of 90 days, within 6 months as clinically observed and by patient self-report.  Patient will and family will demonstrate/report improved family dynamics that can be safely managed in a lower level of care. Absence of persistent conflict in family relationships and reduction in family conflict to level of comfort and satisfaction of family members as measured by client report for a period of at least 30 days within 6 months as clinically observed and by patient self-report.       Intervention:   Therapist met with patient to review goals and interventions. Therapist utilized reflected listening as patient gave brief reflection of week. Patient reported feeling anxious with her daughter moving into group home, her son getting help with his addiction and patient retiring at the end of the year. Therapist supported patient as she processed and validated patient. Therapist educated patient on how transitions can trigger anxiety and for patient to be aware and utilize coping skills and to look at all outcomes.   Patient presented with an anxious affect. Patient was engaged in session and open to feedback. Patient reported no safety concerns.         ASSESSMENT: Current Emotional / Mental Status (status of significant symptoms):   Risk status (Self / Other harm or suicidal ideation)   Patient denies current fears or concerns for personal safety.   Patient denies current or recent suicidal ideation or behaviors.   Patient denies current or recent homicidal ideation or behaviors.   Patient denies current or recent self injurious behavior or ideation.   Patient  denies other safety concerns.   Patient reports there has been no change in risk factors since their last session.     Patient reports there has been no change in protective factors since their last session.     Recommended that patient call 911 or go to the local ED should there be a change in any of these risk factors.     Appearance:   Appropriate    Eye Contact:   Fair    Psychomotor Behavior: Restless    Attitude:   Cooperative    Orientation:   All   Speech    Rate / Production: Emotional Talkative    Volume:  Normal    Mood:    Anxious  Depressed    Affect:    Worrisome    Thought Content:  Clear    Thought Form:  Coherent    Insight:    Fair      Medication Review:   No changes to current psychiatric medication(s)     Medication Compliance:   Yes     Changes in Health Issues:   None reported     Chemical Use Review:   Substance Use: Chemical use reviewed, no active concerns identified      Tobacco Use: No current tobacco use.      Diagnosis:  1. Adjustment disorder with mixed anxiety and depressed mood        Collateral Reports Completed:   Not Applicable    PLAN: (Patient Tasks / Therapist Tasks / Other)   Therapist educated patient on how transitions can trigger anxiety and for patient to be aware and utilize coping skills and to look at all outcomes.       Keren Tracey, [facility] 11/1/2021                                                         ______________________________________________________________________    Treatment Plan    Patient's Name: Slim Mcbride  YOB: 1955    Date: 8/24/2021    DSM5 Diagnoses: Adjustment Disorders  309.28 (F43.23) With mixed anxiety and depressed mood  Psychosocial / Contextual Factors: family conflict (foster daughter possibility of commitment and son meth use and homelessness), loss and relationships.  WHODAS: 24    Referral / Collaboration:  Referral to another professional/service is not indicated at this time..    Anticipated number of session or  this episode of care: 16      MeasurableTreatment Goal(s) related to diagnosis / functional impairment(s)  Goal 1: Patient will report absence of persistent anxiety/depression mood and report of reduced frequency and intensity of worry and absence of persistent anxious/depression mood to acceptable levels, no panic attacks, report subjective comfort with rumination for a period of 90 days. Within 6 months as clinically observed and by patient self-report    I will know I've met my goal when I don't worry so much.      Objective #A (Patient Action)    Patient will demonstrate and report a level of anxiety/depression that can be managed at a lower level of care.  Absence of persistent anxious/depressed mood and report of reduced frequency and intensity of worry and absence of persistent anxious mood to acceptable levels, no panic attacks, report subjective comfort with rumination for a period of 90 days, within 6 months as clinically observed and by patient self-report.  Status: Continued - Date(s): 8/24/2021     Intervention(s)  Therapist will provide individual therapy to identify triggers to anxiety/depression, gain feedback on helpful coping tools and thought-reframing techniques, and identify preferred way of being. Tx to include discuss current stressors and interpersonal conflicts and how to cope with these, coaching, diagnostic testing, referral for medication as indicated, use prescribed medication, cognitive restructuring, interpersonal, family therapy, supportive therapy.        Goal 2: Patient will report absence of persistent conflict in family relationships and reduction in family conflict to level of comfort and satisfaction of family members as measured by client report for a period of at least 30 days within 6 months as clinically observed and by patient self-report    I will know I've met my goal when I can get upset without raising my voice not get upset.      Objective #A (Patient  Action)    Status: Continued - Date(s): 8/24/2021     Patient will and family will demonstrate/report improved family dynamics that can be safely managed in a lower level of care. Absence of persistent conflict in family relationships and reduction in family conflict to level of comfort and satisfaction of family members as measured by client report for a period of at least 30 days within 6 months as clinically observed and by patient self-report.    Intervention(s)  Therapist will provide individual therapy to process stressors within family dynamics, identify areas within her control, and identify preferred way of being within family relationships.  Tx to discuss current stressors and interpersonal conflicts and how to cope with these, coaching, diagnostic testing, referral for medication as indicated, use prescribed medication, cognitive restructuring, interpersonal family therapy, supportive therapy services.        Patient has reviewed and agreed to the above plan.      Keren Tracey, [facility]  8/24/2021

## 2021-11-02 ASSESSMENT — ANXIETY QUESTIONNAIRES: GAD7 TOTAL SCORE: 14

## 2021-11-04 ENCOUNTER — OFFICE VISIT (OUTPATIENT)
Dept: FAMILY MEDICINE | Facility: CLINIC | Age: 66
End: 2021-11-04
Payer: COMMERCIAL

## 2021-11-04 DIAGNOSIS — Z91.199 NO-SHOW FOR APPOINTMENT: Primary | ICD-10-CM

## 2021-11-15 ENCOUNTER — VIRTUAL VISIT (OUTPATIENT)
Dept: PSYCHOLOGY | Facility: CLINIC | Age: 66
End: 2021-11-15
Payer: COMMERCIAL

## 2021-11-15 DIAGNOSIS — F43.23 ADJUSTMENT DISORDER WITH MIXED ANXIETY AND DEPRESSED MOOD: Primary | ICD-10-CM

## 2021-11-15 PROCEDURE — 90834 PSYTX W PT 45 MINUTES: CPT | Mod: 95 | Performed by: SOCIAL WORKER

## 2021-11-15 NOTE — PROGRESS NOTES
Progress Note    Patient Name: Slim Mcbride  Date: 11/15/2021         Service Type: Individual      Session Start Time: 1005 Session End Time: 1055     Session Length: 38-52    Session #: 5    Attendees: Client    Service Modality:  Video Visit:      Provider verified identity through the following two step process.  Patient provided:  Patient  and Patient is known previously to provider    Telemedicine Visit: The patient's condition can be safely assessed and treated via synchronous audio and visual telemedicine encounter.      Reason for Telemedicine Visit: Services only offered telehealth    Originating Site (Patient Location): Patient's home    Distant Site (Provider Location): Provider Remote Setting    Consent:  The patient/guardian has verbally consented to: the potential risks and benefits of telemedicine (video visit) versus in person care; bill my insurance or make self-payment for services provided; and responsibility for payment of non-covered services.     Patient would like the video invitation sent by:  Send to e-mail at: [email or web address]    Mode of Communication:  Video Conference via [employer]well    As the provider I attest to compliance with applicable laws and regulations related to telemedicine.     Treatment Plan Last Reviewed: 2021 due 2021  PHQ-9 / TONE-7 :  last session  DATA  Interactive Complexity: No  Crisis: No       Progress Since Last Session (Related to Symptoms / Goals / Homework):   Symptoms: Worsening per patient    Homework: Partially completed      Episode of Care Goals: Minimal progress - CONTEMPLATION (Considering change and yet undecided); Intervened by assessing the negative and positive thinking (ambivalence) about behavior change     Current / Ongoing Stressors and Concerns:    family conflict (foster daughter possibility of commitment and son meth use and homelessness), loss and relationships.     Treatment Objective(s)  Addressed in This Session:     Patient will demonstrate and report a level of anxiety/depression that can be managed at a lower level of care.  Absence of persistent anxious/depressed mood and report of reduced frequency and intensity of worry and absence of persistent anxious mood to acceptable levels, no panic attacks, report subjective comfort with rumination for a period of 90 days, within 6 months as clinically observed and by patient self-report.  Patient will and family will demonstrate/report improved family dynamics that can be safely managed in a lower level of care. Absence of persistent conflict in family relationships and reduction in family conflict to level of comfort and satisfaction of family members as measured by client report for a period of at least 30 days within 6 months as clinically observed and by patient self-report.       Intervention:   Therapist met with patient to review goals and interventions. Therapist utilized reflected listening as patient gave brief reflection of week. Patient processed her son relapsing and her concerns along with her marriage, long-term and her daughter moving out.Therapist supported patient as she processed and validated patient. Therapist reviewed interventions for patient and encouraged patient speak to her spouse about what is helpful vs not. Therapist encouraged patient to look at what she can control and focus on the little things.   Patient presented with an anxious affect. Patient was engaged in session and open to feedback. Patient reported no safety concerns.         ASSESSMENT: Current Emotional / Mental Status (status of significant symptoms):   Risk status (Self / Other harm or suicidal ideation)   Patient denies current fears or concerns for personal safety.   Patient denies current or recent suicidal ideation or behaviors.   Patient denies current or recent homicidal ideation or behaviors.   Patient denies current or recent self injurious  behavior or ideation.   Patient denies other safety concerns.   Patient reports there has been no change in risk factors since their last session.     Patient reports there has been no change in protective factors since their last session.     Recommended that patient call 911 or go to the local ED should there be a change in any of these risk factors.     Appearance:   Appropriate    Eye Contact:   Fair    Psychomotor Behavior: Restless    Attitude:   Cooperative    Orientation:   All   Speech    Rate / Production: Emotional Talkative    Volume:  Normal    Mood:    Anxious  Depressed    Affect:    Worrisome    Thought Content:  Clear    Thought Form:  Coherent    Insight:    Fair      Medication Review:   No changes to current psychiatric medication(s)     Medication Compliance:   Yes     Changes in Health Issues:   None reported     Chemical Use Review:   Substance Use: Chemical use reviewed, no active concerns identified      Tobacco Use: No current tobacco use.      Diagnosis:  1. Adjustment disorder with mixed anxiety and depressed mood        Collateral Reports Completed:   Not Applicable    PLAN: (Patient Tasks / Therapist Tasks / Other)   patient to look at what she can control and focus on the little things.   Keren Tracey, [facility] 11/15/2021                                                         ______________________________________________________________________    Treatment Plan    Patient's Name: Slim Mcbride  YOB: 1955    Date: 8/24/2021    DSM5 Diagnoses: Adjustment Disorders  309.28 (F43.23) With mixed anxiety and depressed mood  Psychosocial / Contextual Factors: family conflict (foster daughter possibility of commitment and son meth use and homelessness), loss and relationships.  WHODAS: 24    Referral / Collaboration:  Referral to another professional/service is not indicated at this time..    Anticipated number of session or this episode of care: 16      MeasurableTreatment  Goal(s) related to diagnosis / functional impairment(s)  Goal 1: Patient will report absence of persistent anxiety/depression mood and report of reduced frequency and intensity of worry and absence of persistent anxious/depression mood to acceptable levels, no panic attacks, report subjective comfort with rumination for a period of 90 days. Within 6 months as clinically observed and by patient self-report    I will know I've met my goal when I don't worry so much.      Objective #A (Patient Action)    Patient will demonstrate and report a level of anxiety/depression that can be managed at a lower level of care.  Absence of persistent anxious/depressed mood and report of reduced frequency and intensity of worry and absence of persistent anxious mood to acceptable levels, no panic attacks, report subjective comfort with rumination for a period of 90 days, within 6 months as clinically observed and by patient self-report.  Status: Continued - Date(s): 8/24/2021     Intervention(s)  Therapist will provide individual therapy to identify triggers to anxiety/depression, gain feedback on helpful coping tools and thought-reframing techniques, and identify preferred way of being. Tx to include discuss current stressors and interpersonal conflicts and how to cope with these, coaching, diagnostic testing, referral for medication as indicated, use prescribed medication, cognitive restructuring, interpersonal, family therapy, supportive therapy.        Goal 2: Patient will report absence of persistent conflict in family relationships and reduction in family conflict to level of comfort and satisfaction of family members as measured by client report for a period of at least 30 days within 6 months as clinically observed and by patient self-report    I will know I've met my goal when I can get upset without raising my voice not get upset.      Objective #A (Patient Action)    Status: Continued - Date(s): 8/24/2021     Patient will  and family will demonstrate/report improved family dynamics that can be safely managed in a lower level of care. Absence of persistent conflict in family relationships and reduction in family conflict to level of comfort and satisfaction of family members as measured by client report for a period of at least 30 days within 6 months as clinically observed and by patient self-report.    Intervention(s)  Therapist will provide individual therapy to process stressors within family dynamics, identify areas within her control, and identify preferred way of being within family relationships.  Tx to discuss current stressors and interpersonal conflicts and how to cope with these, coaching, diagnostic testing, referral for medication as indicated, use prescribed medication, cognitive restructuring, interpersonal family therapy, supportive therapy services.        Patient has reviewed and agreed to the above plan.      Keren Tracey, LICSW  8/24/2021

## 2021-11-29 ENCOUNTER — VIRTUAL VISIT (OUTPATIENT)
Dept: PSYCHOLOGY | Facility: CLINIC | Age: 66
End: 2021-11-29
Payer: COMMERCIAL

## 2021-11-29 DIAGNOSIS — F43.23 ADJUSTMENT DISORDER WITH MIXED ANXIETY AND DEPRESSED MOOD: Primary | ICD-10-CM

## 2021-11-29 PROCEDURE — 90834 PSYTX W PT 45 MINUTES: CPT | Mod: 95 | Performed by: SOCIAL WORKER

## 2021-11-29 ASSESSMENT — ANXIETY QUESTIONNAIRES
4. TROUBLE RELAXING: SEVERAL DAYS
1. FEELING NERVOUS, ANXIOUS, OR ON EDGE: MORE THAN HALF THE DAYS
7. FEELING AFRAID AS IF SOMETHING AWFUL MIGHT HAPPEN: SEVERAL DAYS
6. BECOMING EASILY ANNOYED OR IRRITABLE: MORE THAN HALF THE DAYS
2. NOT BEING ABLE TO STOP OR CONTROL WORRYING: MORE THAN HALF THE DAYS
3. WORRYING TOO MUCH ABOUT DIFFERENT THINGS: MORE THAN HALF THE DAYS
5. BEING SO RESTLESS THAT IT IS HARD TO SIT STILL: SEVERAL DAYS
GAD7 TOTAL SCORE: 11

## 2021-11-29 NOTE — PROGRESS NOTES
Progress Note    Patient Name: Slim Mcbride  Date: 2021         Service Type: Individual      Session Start Time: 1005 Session End Time: 1046     Session Length: 38-52    Session #: 6    Attendees: Client    Service Modality:  Video Visit:      Provider verified identity through the following two step process.  Patient provided:  Patient  and Patient is known previously to provider    Telemedicine Visit: The patient's condition can be safely assessed and treated via synchronous audio and visual telemedicine encounter.      Reason for Telemedicine Visit: Services only offered telehealth    Originating Site (Patient Location): Patient's home    Distant Site (Provider Location): Provider Remote Setting    Consent:  The patient/guardian has verbally consented to: the potential risks and benefits of telemedicine (video visit) versus in person care; bill my insurance or make self-payment for services provided; and responsibility for payment of non-covered services.     Patient would like the video invitation sent by:  Send to e-mail at: [email or web address]    Mode of Communication:  Video Conference via [employer]    As the provider I attest to compliance with applicable laws and regulations related to telemedicine.     Treatment Plan Last Reviewed: 2021 due   PHQ-9 / TONE-7 :   DATA  Interactive Complexity: No  Crisis: No       Progress Since Last Session (Related to Symptoms / Goals / Homework):   Symptoms: Improving phq-9 tone-7    Homework: Partially completed      Episode of Care Goals: Minimal progress - CONTEMPLATION (Considering change and yet undecided); Intervened by assessing the negative and positive thinking (ambivalence) about behavior change     Current / Ongoing Stressors and Concerns:    family conflict (foster daughter possibility of commitment and son meth use and homelessness), loss and relationships.     Treatment Objective(s) Addressed in  This Session:     Patient will demonstrate and report a level of anxiety/depression that can be managed at a lower level of care.  Absence of persistent anxious/depressed mood and report of reduced frequency and intensity of worry and absence of persistent anxious mood to acceptable levels, no panic attacks, report subjective comfort with rumination for a period of 90 days, within 6 months as clinically observed and by patient self-report.  Patient will and family will demonstrate/report improved family dynamics that can be safely managed in a lower level of care. Absence of persistent conflict in family relationships and reduction in family conflict to level of comfort and satisfaction of family members as measured by client report for a period of at least 30 days within 6 months as clinically observed and by patient self-report.       Intervention:   Therapist met with patient to review goals and interventions. Therapist utilized reflected listening as patient gave brief reflection of week. Patient processed a busy holiday with lots of emotions with seeing her granddaughter for the first time in a year, her daughter moving out this week and speaking to her son who continues to fight addiction. Therapist supported patient as he processed and validated patient. Therapist encouraged patient to be proactive and speaking to her spouse and encouraged effective communication.   Patient presented with an anxious affect. Patient was engaged in session and open to feedback. Patient reported no safety concerns.         ASSESSMENT: Current Emotional / Mental Status (status of significant symptoms):   Risk status (Self / Other harm or suicidal ideation)   Patient denies current fears or concerns for personal safety.   Patient denies current or recent suicidal ideation or behaviors.   Patient denies current or recent homicidal ideation or behaviors.   Patient denies current or recent self injurious behavior or  ideation.   Patient denies other safety concerns.   Patient reports there has been no change in risk factors since their last session.     Patient reports there has been no change in protective factors since their last session.     Recommended that patient call 911 or go to the local ED should there be a change in any of these risk factors.     Appearance:   Appropriate    Eye Contact:   Fair    Psychomotor Behavior: Restless    Attitude:   Cooperative    Orientation:   All   Speech    Rate / Production: Emotional Talkative    Volume:  Normal    Mood:    Anxious  Depressed    Affect:    Worrisome    Thought Content:  Clear    Thought Form:  Coherent    Insight:    Fair      Medication Review:   No changes to current psychiatric medication(s)     Medication Compliance:   Yes     Changes in Health Issues:   None reported     Chemical Use Review:   Substance Use: Chemical use reviewed, no active concerns identified      Tobacco Use: No current tobacco use.      Diagnosis:  1. Adjustment disorder with mixed anxiety and depressed mood        Collateral Reports Completed:   Not Applicable    PLAN: (Patient Tasks / Therapist Tasks / Other)   patient to be proactive and speaking to her spouse and encouraged effective communication.     Keren Tracey, [facility] 11/29/2021                                                         ______________________________________________________________________    Treatment Plan    Patient's Name: Slim Mcbride  YOB: 1955    Date: 11/29/2021    DSM5 Diagnoses: Adjustment Disorders  309.28 (F43.23) With mixed anxiety and depressed mood  Psychosocial / Contextual Factors: family conflict (foster daughter possibility of commitment and son meth use and homelessness), loss and relationships.  WHODAS: 24    Referral / Collaboration:  Referral to another professional/service is not indicated at this time..    Anticipated number of session or this episode of care:  16      MeasurableTreatment Goal(s) related to diagnosis / functional impairment(s)  Goal 1: Patient will report absence of persistent anxiety/depression mood and report of reduced frequency and intensity of worry and absence of persistent anxious/depression mood to acceptable levels, no panic attacks, report subjective comfort with rumination for a period of 90 days. Within 6 months as clinically observed and by patient self-report    I will know I've met my goal when I don't worry so much.      Objective #A (Patient Action)    Patient will demonstrate and report a level of anxiety/depression that can be managed at a lower level of care.  Absence of persistent anxious/depressed mood and report of reduced frequency and intensity of worry and absence of persistent anxious mood to acceptable levels, no panic attacks, report subjective comfort with rumination for a period of 90 days, within 6 months as clinically observed and by patient self-report.  Status: Continued - Date(s): 11/29/2021    Intervention(s)  Therapist will provide individual therapy to identify triggers to anxiety/depression, gain feedback on helpful coping tools and thought-reframing techniques, and identify preferred way of being. Tx to include discuss current stressors and interpersonal conflicts and how to cope with these, coaching, diagnostic testing, referral for medication as indicated, use prescribed medication, cognitive restructuring, interpersonal, family therapy, supportive therapy.        Goal 2: Patient will report absence of persistent conflict in family relationships and reduction in family conflict to level of comfort and satisfaction of family members as measured by client report for a period of at least 30 days within 6 months as clinically observed and by patient self-report    I will know I've met my goal when I can get upset without raising my voice not get upset.      Objective #A (Patient Action)    Status: Continued - Date(s):  11/29/2021    Patient will and family will demonstrate/report improved family dynamics that can be safely managed in a lower level of care. Absence of persistent conflict in family relationships and reduction in family conflict to level of comfort and satisfaction of family members as measured by client report for a period of at least 30 days within 6 months as clinically observed and by patient self-report.    Intervention(s)  Therapist will provide individual therapy to process stressors within family dynamics, identify areas within her control, and identify preferred way of being within family relationships.  Tx to discuss current stressors and interpersonal conflicts and how to cope with these, coaching, diagnostic testing, referral for medication as indicated, use prescribed medication, cognitive restructuring, interpersonal family therapy, supportive therapy services.        Patient has reviewed and agreed to the above plan.      Keren Tracey, [facility]  11/29/2021

## 2021-11-30 ASSESSMENT — PATIENT HEALTH QUESTIONNAIRE - PHQ9: SUM OF ALL RESPONSES TO PHQ QUESTIONS 1-9: 5

## 2021-11-30 ASSESSMENT — ANXIETY QUESTIONNAIRES: GAD7 TOTAL SCORE: 11

## 2021-12-25 ENCOUNTER — HEALTH MAINTENANCE LETTER (OUTPATIENT)
Age: 66
End: 2021-12-25

## 2022-01-27 ENCOUNTER — VIRTUAL VISIT (OUTPATIENT)
Dept: PSYCHOLOGY | Facility: CLINIC | Age: 67
End: 2022-01-27
Payer: COMMERCIAL

## 2022-01-27 DIAGNOSIS — F43.23 ADJUSTMENT DISORDER WITH MIXED ANXIETY AND DEPRESSED MOOD: Primary | ICD-10-CM

## 2022-01-27 PROCEDURE — 90832 PSYTX W PT 30 MINUTES: CPT | Mod: 95 | Performed by: SOCIAL WORKER

## 2022-01-27 ASSESSMENT — ANXIETY QUESTIONNAIRES
3. WORRYING TOO MUCH ABOUT DIFFERENT THINGS: MORE THAN HALF THE DAYS
2. NOT BEING ABLE TO STOP OR CONTROL WORRYING: NEARLY EVERY DAY
6. BECOMING EASILY ANNOYED OR IRRITABLE: SEVERAL DAYS
GAD7 TOTAL SCORE: 11
GAD7 TOTAL SCORE: 11
1. FEELING NERVOUS, ANXIOUS, OR ON EDGE: MORE THAN HALF THE DAYS
7. FEELING AFRAID AS IF SOMETHING AWFUL MIGHT HAPPEN: MORE THAN HALF THE DAYS
GAD7 TOTAL SCORE: 11
7. FEELING AFRAID AS IF SOMETHING AWFUL MIGHT HAPPEN: MORE THAN HALF THE DAYS
5. BEING SO RESTLESS THAT IT IS HARD TO SIT STILL: NOT AT ALL
4. TROUBLE RELAXING: SEVERAL DAYS

## 2022-01-27 ASSESSMENT — PATIENT HEALTH QUESTIONNAIRE - PHQ9
SUM OF ALL RESPONSES TO PHQ QUESTIONS 1-9: 8
SUM OF ALL RESPONSES TO PHQ QUESTIONS 1-9: 8
10. IF YOU CHECKED OFF ANY PROBLEMS, HOW DIFFICULT HAVE THESE PROBLEMS MADE IT FOR YOU TO DO YOUR WORK, TAKE CARE OF THINGS AT HOME, OR GET ALONG WITH OTHER PEOPLE: SOMEWHAT DIFFICULT

## 2022-01-27 NOTE — PROGRESS NOTES
Progress Note    Patient Name: Slim Mcbride  Date: 2022         Service Type: Individual      Session Start Time: 1500 Session End Time: 153     Session Length: 16-37    Session #: 7    Attendees: Client    Service Modality:  Video Visit:      Provider verified identity through the following two step process.  Patient provided:  Patient  and Patient is known previously to provider    Telemedicine Visit: The patient's condition can be safely assessed and treated via synchronous audio and visual telemedicine encounter.      Reason for Telemedicine Visit: Services only offered telehealth    Originating Site (Patient Location): Patient's home    Distant Site (Provider Location): Provider Remote Setting    Consent:  The patient/guardian has verbally consented to: the potential risks and benefits of telemedicine (video visit) versus in person care; bill my insurance or make self-payment for services provided; and responsibility for payment of non-covered services.     Patient would like the video invitation sent by:  Send to e-mail at: [email or web address]    Mode of Communication:  Video Conference via [employer]well    As the provider I attest to compliance with applicable laws and regulations related to telemedicine.     Treatment Plan Last Reviewed: 2021 due   PHQ-9 / TONE-7 :   DATA  Interactive Complexity: No  Crisis: No       Progress Since Last Session (Related to Symptoms / Goals / Homework):   Symptoms: No change PHQ-9 TONE-7    Homework: Partially completed      Episode of Care Goals: Minimal progress - CONTEMPLATION (Considering change and yet undecided); Intervened by assessing the negative and positive thinking (ambivalence) about behavior change     Current / Ongoing Stressors and Concerns:    family conflict (foster daughter possibility of commitment and son meth use and homelessness), loss and relationships.     Treatment Objective(s)  Addressed in This Session:     Patient will demonstrate and report a level of anxiety/depression that can be managed at a lower level of care.  Absence of persistent anxious/depressed mood and report of reduced frequency and intensity of worry and absence of persistent anxious mood to acceptable levels, no panic attacks, report subjective comfort with rumination for a period of 90 days, within 6 months as clinically observed and by patient self-report.  Patient will and family will demonstrate/report improved family dynamics that can be safely managed in a lower level of care. Absence of persistent conflict in family relationships and reduction in family conflict to level of comfort and satisfaction of family members as measured by client report for a period of at least 30 days within 6 months as clinically observed and by patient self-report.       Intervention:   Therapist met with patient to review goals and interventions. Therapist utilized reflected listening as patient gave brief reflection of week. Patient reported difficult week with missing vacation due to COVID and her son missing. Therapist supported patient as she processed, validated patient and challenged patient projecting her emotions on her spouse. Therapist shared concerns with patient emotions towards her son with his drug use and educated patient on where we release our emotions in safe places but for patient to direct them back to her son.   Patient presented with an anxious affect. Patient was engaged in session and open to feedback. Patient reported no safety concerns.         ASSESSMENT: Current Emotional / Mental Status (status of significant symptoms):   Risk status (Self / Other harm or suicidal ideation)   Patient denies current fears or concerns for personal safety.   Patient denies current or recent suicidal ideation or behaviors.   Patient denies current or recent homicidal ideation or behaviors.   Patient denies current or recent self  injurious behavior or ideation.   Patient denies other safety concerns.   Patient reports there has been no change in risk factors since their last session.     Patient reports there has been no change in protective factors since their last session.     Recommended that patient call 911 or go to the local ED should there be a change in any of these risk factors.     Appearance:   Appropriate    Eye Contact:   Fair    Psychomotor Behavior: Restless    Attitude:   Cooperative    Orientation:   All   Speech    Rate / Production: Emotional Talkative    Volume:  Normal    Mood:    Anxious  Depressed    Affect:    Worrisome    Thought Content:  Clear    Thought Form:  Coherent    Insight:    Fair      Medication Review:   No changes to current psychiatric medication(s)     Medication Compliance:   Yes     Changes in Health Issues:   None reported     Chemical Use Review:   Substance Use: Chemical use reviewed, no active concerns identified      Tobacco Use: No current tobacco use.      Diagnosis:  1. Adjustment disorder with mixed anxiety and depressed mood        Collateral Reports Completed:   Not Applicable    PLAN: (Patient Tasks / Therapist Tasks / Other)  Watch for projecting   Set up schedule for long-term    Keren SAMANTA Tracey, [facility] 1/27/2022                                                         ______________________________________________________________________    Treatment Plan    Patient's Name: Slim Mcbride  YOB: 1955    Date: 11/29/2021    DSM5 Diagnoses: Adjustment Disorders  309.28 (F43.23) With mixed anxiety and depressed mood  Psychosocial / Contextual Factors: family conflict (foster daughter possibility of commitment and son meth use and homelessness), loss and relationships.  WHODAS: 24    Referral / Collaboration:  Referral to another professional/service is not indicated at this time..    Anticipated number of session or this episode of care: 16      MeasurableTreatment Goal(s)  related to diagnosis / functional impairment(s)  Goal 1: Patient will report absence of persistent anxiety/depression mood and report of reduced frequency and intensity of worry and absence of persistent anxious/depression mood to acceptable levels, no panic attacks, report subjective comfort with rumination for a period of 90 days. Within 6 months as clinically observed and by patient self-report    I will know I've met my goal when I don't worry so much.      Objective #A (Patient Action)    Patient will demonstrate and report a level of anxiety/depression that can be managed at a lower level of care.  Absence of persistent anxious/depressed mood and report of reduced frequency and intensity of worry and absence of persistent anxious mood to acceptable levels, no panic attacks, report subjective comfort with rumination for a period of 90 days, within 6 months as clinically observed and by patient self-report.  Status: Continued - Date(s): 11/29/2021    Intervention(s)  Therapist will provide individual therapy to identify triggers to anxiety/depression, gain feedback on helpful coping tools and thought-reframing techniques, and identify preferred way of being. Tx to include discuss current stressors and interpersonal conflicts and how to cope with these, coaching, diagnostic testing, referral for medication as indicated, use prescribed medication, cognitive restructuring, interpersonal, family therapy, supportive therapy.        Goal 2: Patient will report absence of persistent conflict in family relationships and reduction in family conflict to level of comfort and satisfaction of family members as measured by client report for a period of at least 30 days within 6 months as clinically observed and by patient self-report    I will know I've met my goal when I can get upset without raising my voice not get upset.      Objective #A (Patient Action)    Status: Continued - Date(s): 11/29/2021    Patient will and  family will demonstrate/report improved family dynamics that can be safely managed in a lower level of care. Absence of persistent conflict in family relationships and reduction in family conflict to level of comfort and satisfaction of family members as measured by client report for a period of at least 30 days within 6 months as clinically observed and by patient self-report.    Intervention(s)  Therapist will provide individual therapy to process stressors within family dynamics, identify areas within her control, and identify preferred way of being within family relationships.  Tx to discuss current stressors and interpersonal conflicts and how to cope with these, coaching, diagnostic testing, referral for medication as indicated, use prescribed medication, cognitive restructuring, interpersonal family therapy, supportive therapy services.        Patient has reviewed and agreed to the above plan.      Keren Tracey, LICSW  11/29/2021

## 2022-01-28 ASSESSMENT — ANXIETY QUESTIONNAIRES: GAD7 TOTAL SCORE: 11

## 2022-01-28 ASSESSMENT — PATIENT HEALTH QUESTIONNAIRE - PHQ9: SUM OF ALL RESPONSES TO PHQ QUESTIONS 1-9: 8

## 2022-02-19 ENCOUNTER — HEALTH MAINTENANCE LETTER (OUTPATIENT)
Age: 67
End: 2022-02-19

## 2022-03-07 ENCOUNTER — VIRTUAL VISIT (OUTPATIENT)
Dept: PSYCHOLOGY | Facility: CLINIC | Age: 67
End: 2022-03-07
Payer: COMMERCIAL

## 2022-03-07 DIAGNOSIS — F43.23 ADJUSTMENT DISORDER WITH MIXED ANXIETY AND DEPRESSED MOOD: Primary | ICD-10-CM

## 2022-03-07 PROCEDURE — 90834 PSYTX W PT 45 MINUTES: CPT | Mod: 95 | Performed by: SOCIAL WORKER

## 2022-03-07 ASSESSMENT — ANXIETY QUESTIONNAIRES
7. FEELING AFRAID AS IF SOMETHING AWFUL MIGHT HAPPEN: MORE THAN HALF THE DAYS
3. WORRYING TOO MUCH ABOUT DIFFERENT THINGS: MORE THAN HALF THE DAYS
4. TROUBLE RELAXING: NOT AT ALL
6. BECOMING EASILY ANNOYED OR IRRITABLE: MORE THAN HALF THE DAYS
GAD7 TOTAL SCORE: 10
2. NOT BEING ABLE TO STOP OR CONTROL WORRYING: NEARLY EVERY DAY
1. FEELING NERVOUS, ANXIOUS, OR ON EDGE: SEVERAL DAYS
5. BEING SO RESTLESS THAT IT IS HARD TO SIT STILL: NOT AT ALL

## 2022-03-07 ASSESSMENT — PATIENT HEALTH QUESTIONNAIRE - PHQ9: SUM OF ALL RESPONSES TO PHQ QUESTIONS 1-9: 10

## 2022-03-07 NOTE — PROGRESS NOTES
[facility]                                     Progress Note    Patient Name: Slim Mcbride  Date: 3.7.2022         Service Type: Individual      Session Start Time: 1304  Session End Time:      Session Length: 38-52    Session #: 8    Attendees: Client    Service Modality:  Video Visit:      Provider verified identity through the following two step process.  Patient provided:  Patient  and Patient is known previously to provider    Telemedicine Visit: The patient's condition can be safely assessed and treated via synchronous audio and visual telemedicine encounter.      Reason for Telemedicine Visit: Services only offered telehealth    Originating Site (Patient Location): Patient's home    Distant Site (Provider Location): Provider Remote Setting- Home Office    Consent:  The patient/guardian has verbally consented to: the potential risks and benefits of telemedicine (video visit) versus in person care; bill my insurance or make self-payment for services provided; and responsibility for payment of non-covered services.     Patient would like the video invitation sent by:  Send to e-mail at: [email or web address]    Mode of Communication:  Video Conference via [employer]    As the provider I attest to compliance with applicable laws and regulations related to telemedicine.    DATA  Interactive Complexity: No  Crisis: No        Progress Since Last Session (Related to Symptoms / Goals / Homework):   Symptoms: Worsening phq-9 lucas-7    Homework: Partially completed      Episode of Care Goals: Minimal progress - CONTEMPLATION (Considering change and yet undecided); Intervened by assessing the negative and positive thinking (ambivalence) about behavior change     Current / Ongoing Stressors and Concerns:    family conflict (foster daughter possibility of commitment and son meth use and homelessness), loss and relationships.     Treatment Objective(s) Addressed in This Session:     Patient will  demonstrate and report a level of anxiety/depression that can be managed at a lower level of care.  Absence of persistent anxious/depressed mood and report of reduced frequency and intensity of worry and absence of persistent anxious mood to acceptable levels, no panic attacks, report subjective comfort with rumination for a period of 90 days, within 6 months as clinically observed and by patient self-report.  Patient will and family will demonstrate/report improved family dynamics that can be safely managed in a lower level of care. Absence of persistent conflict in family relationships and reduction in family conflict to level of comfort and satisfaction of family members as measured by client report for a period of at least 30 days within 6 months as clinically observed and by patient self-report.     Intervention:   Motivational Interviewing    MI Intervention: Co-Developed Goal: look into interventions along with boundareis, Expressed Empathy/Understanding, Supported Autonomy, Collaboration, Evocation, Permission to raise concern or advise, Open-ended questions, Reflections: simple and complex and Rolled with resistance: Emphasized patient autonomy, Simple reflection, Complex reflection and Amplified reflection (weaker or stronger meaning)     Change Talk Expressed by the Patient: Desire to change Ability to change Reasons to change Need to change    Provider Response to Change Talk: E - Evoked more info from patient about behavior change, A - Affirmed patient's thoughts, decisions, or attempts at behavior change, R - Reflected patient's change talk and S - Summarized patient's change talk statements      Assessments completed prior to visit:  The following assessments were completed by patient for this visit:  PHQ9:   PHQ-9 SCORE 4/26/2021 5/5/2021 7/19/2021 11/1/2021 11/29/2021 1/27/2022 3/7/2022   PHQ-9 Total Score [facility] - - - - - 8 (Mild depression) -   PHQ-9 Total Score 16 9 6 7 5 8 10     GAD7:   TONE-7  SCORE 4/26/2021 5/5/2021 7/19/2021 11/1/2021 11/29/2021 1/27/2022 3/7/2022   Total Score - - - - - 11 (moderate anxiety) -   Total Score 15 10 13 14 11 11 10     CAGE-AID:   CAGE-AID Total Score 4/26/2021   Total Score 2     [place] Suicide Severity Rating Scale (Lifetime/Recent)  [place] Suicide Severity Rating (Lifetime/Recent) 4/26/2021   Wish to be Dead (Lifetime) No   Non-Specific Active Suicidal Thoughts (Lifetime) No   RETIRED: 1. Wish to be Dead (Recent) No   RETIRED: 2. Non-Specific Active Suicidal Thoughts (Recent) No   3. Active Suicidal Ideation with any Methods (Not Plan) Without Intent to Act (Lifetime) No   RETIRED: 3. Active Suicidal Ideation with any Methods (Not Plan) Without Intent to Act (Recent) No   RETIRE: 4. Active Suicidal Ideation with Some Intent to Act, Without Specific Plan (Lifetime) No   4. Active Suicidal Ideation with Some Intent to Act, Without Specific Plan (Recent) No   RETIRE: 5. Active Suicidal Ideation with Specific Plan and Intent (Lifetime) No   RETIRED: 5. Active Suicidal Ideation with Specific Plan and Intent (Recent) No   Actual Attempt (Lifetime) No   Actual Attempt (Past 3 Months) No   Has subject engaged in non-suicidal self-injurious behavior? (Lifetime) No   Has subject engaged in non-suicidal self-injurious behavior? (Past 3 Months) No   Interrupted Attempts (Lifetime) No   Interrupted Attempts (Past 3 Months) No   Aborted or Self-Interrupted Attempt (Lifetime) No   Aborted or Self-Interrupted Attempt (Past 3 Months) No   Preparatory Acts or Behavior (Lifetime) No   Preparatory Acts or Behavior (Past 3 Months) No         ASSESSMENT: Current Emotional / Mental Status (status of significant symptoms):   Risk status (Self / Other harm or suicidal ideation)   Patient denies current fears or concerns for personal safety.   Patient denies current or recent suicidal ideation or behaviors.   Patient denies current or recent homicidal ideation or behaviors.   Patient denies  current or recent self injurious behavior or ideation.   Patient denies other safety concerns.   Patient reports there has been no change in risk factors since their last session.     Patient reports there has been no change in protective factors since their last session.     Recommended that patient call 911 or go to the local ED should there be a change in any of these risk factors.     Appearance:   Appropriate    Eye Contact:   Fair    Psychomotor Behavior: Restless    Attitude:   Cooperative    Orientation:   All   Speech    Rate / Production: Emotional Talkative    Volume:  Normal    Mood:    Anxious  Depressed  Sad    Affect:    Worrisome    Thought Content:  Clear    Thought Form:  Coherent    Insight:    Fair      Medication Review:   No changes to current psychiatric medication(s)     Medication Compliance:   Yes     Changes in Health Issues:   None reported     Chemical Use Review:   Substance Use: Chemical use reviewed, no active concerns identified      Tobacco Use: No current tobacco use.      Diagnosis:  1. Adjustment disorder with mixed anxiety and depressed mood        Collateral Reports Completed:   Not Applicable    PLAN: (Patient Tasks / Therapist Tasks / Other)  Look into intervention for son  Call family and friends   Boundaries and self care        Keren Trcaey, [facility]  3/7/2022                                                         ______________________________________________________________________    Individual Treatment Plan    Patient's Name: Slim Mcbride  YOB: 1955    Date of Creation: 11/29/2021  Date Treatment Plan Last Reviewed/Revised: 3/7/2022 due 6/7/2022    DSM5 Diagnoses: Adjustment Disorders  309.28 (F43.23) With mixed anxiety and depressed mood  Psychosocial / Contextual Factors:  family conflict (foster daughter possibility of commitment and son meth use and homelessness), loss and relationships.  PROMIS (reviewed every 90 days):     Referral /  Collaboration:  Referral to another professional/service is not indicated at this time..    Anticipated number of session for this episode of care: 12  Anticipation frequency of session: Monthly  Anticipated Duration of each session: 38-52 minutes  Treatment plan will be reviewed in 90 days or when goals have been changed.       MeasurableTreatment Goal(s) related to diagnosis / functional impairment(s)  Goal 1: Patient will report absence of persistent anxiety/depression mood and report of reduced frequency and intensity of worry and absence of persistent anxious/depression mood to acceptable levels, no panic attacks, report subjective comfort with rumination for a period of 90 days. Within 6 months as clinically observed and by patient self-report    I will know I've met my goal when I don't worry so much.      Objective #A (Patient Action)    Patient will demonstrate and report a level of anxiety/depression that can be managed at a lower level of care.  Absence of persistent anxious/depressed mood and report of reduced frequency and intensity of worry and absence of persistent anxious mood to acceptable levels, no panic attacks, report subjective comfort with rumination for a period of 90 days, within 6 months as clinically observed and by patient self-report.  Status: Continued - Date(s): 3/7/2022     Intervention(s)  Therapist will provide individual therapy to identify triggers to anxiety/depression, gain feedback on helpful coping tools and thought-reframing techniques, and identify preferred way of being. Tx to include discuss current stressors and interpersonal conflicts and how to cope with these, coaching, diagnostic testing, referral for medication as indicated, use prescribed medication, cognitive restructuring, interpersonal, family therapy, supportive therapy.        Goal 2: Patient will report absence of persistent conflict in family relationships and reduction in family conflict to level of comfort  and satisfaction of family members as measured by client report for a period of at least 30 days within 6 months as clinically observed and by patient self-report    I will know I've met my goal when I can get upset without raising my voice not get upset.      Objective #A (Patient Action)    Status: Continued - Date(s): 3/7/2022     Patient will and family will demonstrate/report improved family dynamics that can be safely managed in a lower level of care. Absence of persistent conflict in family relationships and reduction in family conflict to level of comfort and satisfaction of family members as measured by client report for a period of at least 30 days within 6 months as clinically observed and by patient self-report.    Intervention(s)  Therapist will provide individual therapy to process stressors within family dynamics, identify areas within her control, and identify preferred way of being within family relationships.  Tx to discuss current stressors and interpersonal conflicts and how to cope with these, coaching, diagnostic testing, referral for medication as indicated, use prescribed medication, cognitive restructuring, interpersonal family therapy, supportive therapy services.        Patient has reviewed and agreed to the above plan.      Keren Tracey, [facility]  March 7, 2022

## 2022-03-08 ASSESSMENT — ANXIETY QUESTIONNAIRES: GAD7 TOTAL SCORE: 10

## 2022-10-22 ENCOUNTER — HEALTH MAINTENANCE LETTER (OUTPATIENT)
Age: 67
End: 2022-10-22

## 2023-02-28 ENCOUNTER — OFFICE VISIT (OUTPATIENT)
Dept: OPTOMETRY | Facility: CLINIC | Age: 68
End: 2023-02-28
Payer: COMMERCIAL

## 2023-02-28 DIAGNOSIS — H52.4 PRESBYOPIA: ICD-10-CM

## 2023-02-28 DIAGNOSIS — Z01.01 VISION EXAM WITH ABNORMAL FINDINGS: Primary | ICD-10-CM

## 2023-02-28 DIAGNOSIS — H25.013 CORTICAL SENILE CATARACT OF BOTH EYES: ICD-10-CM

## 2023-02-28 DIAGNOSIS — H04.123 DRY EYES: ICD-10-CM

## 2023-02-28 DIAGNOSIS — H52.03 HYPEROPIA, BILATERAL: ICD-10-CM

## 2023-02-28 PROCEDURE — 92015 DETERMINE REFRACTIVE STATE: CPT | Performed by: OPTOMETRIST

## 2023-02-28 PROCEDURE — 92310 CONTACT LENS FITTING OU: CPT | Mod: GA | Performed by: OPTOMETRIST

## 2023-02-28 PROCEDURE — 92004 COMPRE OPH EXAM NEW PT 1/>: CPT | Performed by: OPTOMETRIST

## 2023-02-28 RX ORDER — PHENTERMINE HYDROCHLORIDE 30 MG/1
30 CAPSULE ORAL
COMMUNITY
Start: 2022-12-28

## 2023-02-28 ASSESSMENT — REFRACTION_CURRENTRX
OS_ADDL_SPECS: MONOVISION
OD_BRAND: B&L INFUSE ONE DAY BC 8.6 D14.2
OD_SPHERE: +2.75
OS_SPHERE: +0.50
OS_BRAND: NO LENS
OD_ADDL_SPECS: NEAR
OD_SPHERE: +2.75

## 2023-02-28 ASSESSMENT — VISUAL ACUITY
METHOD: SNELLEN - LINEAR
OS_PH_CC: 20/40
OD_CC: 20/40
METHOD: SNELLEN - LINEAR
OD_PH_CC+: -1
OD_PH_CC: 20/30
OS_CC: 20/40
OD_CC+: -2
OS_CC: 20/25-1
OS_SC: 20/200
OD_CC: 20/40-1
OD_CC: 20/25
CORRECTION_TYPE: GLASSES, CONTACTS
CORRECTION_TYPE: GLASSES

## 2023-02-28 ASSESSMENT — REFRACTION_MANIFEST
OD_CYLINDER: +1.00
OD_SPHERE: +0.75
OD_AXIS: 156
OS_ADD: +2.25
OD_SPHERE: +0.75
OS_SPHERE: +0.25
OD_CYLINDER: SPHERE
OS_CYLINDER: SPHERE
OS_SPHERE: +0.25
OD_ADD: +2.25
OS_AXIS: 050
OS_CYLINDER: +0.25

## 2023-02-28 ASSESSMENT — TONOMETRY
IOP_METHOD: APPLANATION
OS_IOP_MMHG: 19
OD_IOP_MMHG: 19

## 2023-02-28 ASSESSMENT — CONF VISUAL FIELD
OD_SUPERIOR_TEMPORAL_RESTRICTION: 0
OS_NORMAL: 1
OD_INFERIOR_TEMPORAL_RESTRICTION: 0
OD_SUPERIOR_NASAL_RESTRICTION: 0
OS_SUPERIOR_TEMPORAL_RESTRICTION: 0
OD_NORMAL: 1
OS_INFERIOR_TEMPORAL_RESTRICTION: 0
OD_INFERIOR_NASAL_RESTRICTION: 0
OS_SUPERIOR_NASAL_RESTRICTION: 0
OS_INFERIOR_NASAL_RESTRICTION: 0

## 2023-02-28 ASSESSMENT — REFRACTION_WEARINGRX
OD_AXIS: 020
OS_AXIS: 072
OS_CYLINDER: +0.50
SPECS_TYPE: PAL
OD_SPHERE: +1.00
OD_CYLINDER: +0.50
OS_ADD: +2.25
OD_ADD: +2.25
OS_SPHERE: +1.00

## 2023-02-28 ASSESSMENT — CUP TO DISC RATIO
OD_RATIO: 0.3
OS_RATIO: 0.3

## 2023-02-28 ASSESSMENT — SLIT LAMP EXAM - LIDS
COMMENTS: NORMAL
COMMENTS: NORMAL

## 2023-02-28 ASSESSMENT — KERATOMETRY
OS_K2POWER_DIOPTERS: 42.00
OS_AXISANGLE2_DEGREES: 149
OD_K2POWER_DIOPTERS: 41.75
OS_K1POWER_DIOPTERS: 41.25
OD_AXISANGLE2_DEGREES: 34
OD_K1POWER_DIOPTERS: 41.50

## 2023-02-28 NOTE — PROGRESS NOTES
Chief Complaint   Patient presents with     COMPREHENSIVE EYE EXAM     Contact Lens Re-fitting     Wears a contact in right eye only. Does have one for the left eye, she never wears it.         Last Eye Exam: 1-2 years ago [facility]   Dilated Previously: Yes    What are you currently using to see?  Glasses, uses as needed, mostly for reading. Also wearing a contact in her right eye. Uses +2.75 readers that she uses over the contact       Distance Vision Acuity: Satisfied with vision, no glasses for distance tasks     Near Vision Acuity: Not good with contact, better with contact and readers or with her Rx glasses     Eye Comfort: Right eye tends to be dry.   Do you use eye drops? 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       OBJECTIVE: See Ophthalmology exam    ASSESSMENT:    ICD-10-CM    1. Vision exam with abnormal findings  Z01.01       2. Hyperopia, bilateral  H52.03       3. Presbyopia  H52.4       4. 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 Dry eyes  H04.123           PLAN:   Advised not safe to sleep in 1 day lenses  Patient Instructions   Fill glasses prescription  Contact lenses prescription released to patient today.  Test new trials one week, then alright to order supply.  Return for contact lenses check appointment as needed if any vision or comfort concerns  Mild cataracts discussed   Monitor black floater - if it changes return to clinic for dilation   Return in 1 year for eye exam    Mireya Felix,  OD  [phone]

## 2023-02-28 NOTE — PATIENT INSTRUCTIONS
Fill glasses prescription  Contact lenses prescription released to patient today.  Test new trials one week, then alright to order supply.  Return for contact lenses check appointment as needed if any vision or comfort concerns  Mild cataracts discussed   Monitor black floater - if it changes return to clinic for dilation   Return in 1 year for eye exam    Mireya Felix, OD  [phone]

## 2023-02-28 NOTE — LETTER
2/28/2023         RE: Slim Mcbride  [address] [place] [address]        Dear Colleague,    Thank you for referring your patient, Slim Mcbride, to the [facility]. Please see a copy of my visit note below.    Chief Complaint   Patient presents with     COMPREHENSIVE EYE EXAM     Contact Lens Re-fitting     Wears a contact in right eye only. Does have one for the left eye, she never wears it.         Last Eye Exam: 1-2 years ago [facility]   Dilated Previously: Yes    What are you currently using to see?  Glasses, uses as needed, mostly for reading. Also wearing a contact in her right eye. Uses +2.75 readers that she uses over the contact       Distance Vision Acuity: Satisfied with vision, no glasses for distance tasks     Near Vision Acuity: Not good with contact, better with contact and readers or with her Rx glasses     Eye Comfort: Right eye tends to be dry.   Do you use eye drops? : Yes: Uses a drop for dryness, doesn't remember the brand   Occupation or Hobbies: Retired     Shahida Apple Optometric Assistant       Sees black [place] floater  in left eye- moves away when she tries to look at it , started 2 months ago , no change in size since then  Denies any flashes     After Concussion in 2016, saw ghosting, took several months to go away, noticed mild version lately , not sure which conditions make it more obvious    Medical, surgical and family histories reviewed and updated 2/28/2023.       OBJECTIVE: See Ophthalmology exam    ASSESSMENT:    ICD-10-CM    1. Vision exam with abnormal findings  Z01.01       2. Hyperopia, bilateral  H52.03       3. Presbyopia  H52.4       4. 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 Dry eyes  H04.123           PLAN:   Advised not safe to sleep in 1 day lenses  Patient Instructions   Fill glasses prescription  Contact lenses prescription released to patient today.  Test new trials one week, then alright to order  supply.  Return for contact lenses check appointment as needed if any vision or comfort concerns  Mild cataracts discussed   Monitor black floater - if it changes return to clinic for dilation   Return in 1 year for eye exam    Mireya Felix, OD  [phone]                                  Again, thank you for allowing me to participate in the care of your patient.        Sincerely,        Mireya Felix, OD

## 2023-04-01 ENCOUNTER — HEALTH MAINTENANCE LETTER (OUTPATIENT)
Age: 68
End: 2023-04-01

## 2024-06-02 ENCOUNTER — HEALTH MAINTENANCE LETTER (OUTPATIENT)
Age: 69
End: 2024-06-02

## 2025-03-22 ENCOUNTER — HEALTH MAINTENANCE LETTER (OUTPATIENT)
Age: 70
End: 2025-03-22

## 2025-06-14 ENCOUNTER — HEALTH MAINTENANCE LETTER (OUTPATIENT)
Age: 70
End: 2025-06-14

## (undated) DEVICE — SOL NACL 0.9% INJ 1000ML BAG 2B1324X

## (undated) DEVICE — SU MONOCRYL 4-0 PS-2 18" UND Y496G

## (undated) DEVICE — CATH FOLEY 16FR 5ML SILICONE LUBRI-SIL 175816

## (undated) DEVICE — RETR RING LINA SEASTAR SQUARE 7X7" 4000-S-Q

## (undated) DEVICE — SU DERMABOND ADVANCED .7ML DNX12

## (undated) DEVICE — SOL WATER IRRIG 500ML BOTTLE 2F7113

## (undated) DEVICE — PACK VAG HYST

## (undated) DEVICE — SOL NACL 0.9% IRRIG 3000ML BAG 2B7477

## (undated) DEVICE — SUCTION MANIFOLD NEPTUNE 2 SYS 1 PORT 702-025-000

## (undated) DEVICE — TUBING CYSTO/BLADDER IRRIG SET 80" 06544-01

## (undated) DEVICE — SU MONOCRYL 3-0 SH 27" UND Y416H

## (undated) DEVICE — LINEN TOWEL PACK X5 5464

## (undated) DEVICE — GLOVE PROTEXIS W/NEU-THERA 6.5  2D73TE65

## (undated) DEVICE — SUCTION MANIFOLD NEPTUNE 2 SYS 4 PORT 0702-020-000

## (undated) DEVICE — SOL NACL 0.9% IRRIG 500ML BOTTLE 2F7123

## (undated) RX ORDER — GABAPENTIN 300 MG/1
CAPSULE ORAL
Status: DISPENSED
Start: 2018-03-12

## (undated) RX ORDER — KETOROLAC TROMETHAMINE 30 MG/ML
INJECTION, SOLUTION INTRAMUSCULAR; INTRAVENOUS
Status: DISPENSED
Start: 2018-03-12

## (undated) RX ORDER — CEFAZOLIN SODIUM 1 G/3ML
INJECTION, POWDER, FOR SOLUTION INTRAMUSCULAR; INTRAVENOUS
Status: DISPENSED
Start: 2018-03-12

## (undated) RX ORDER — ONDANSETRON 2 MG/ML
INJECTION INTRAMUSCULAR; INTRAVENOUS
Status: DISPENSED
Start: 2018-03-12

## (undated) RX ORDER — EPINEPHRINE 1 MG/ML
INJECTION, SOLUTION, CONCENTRATE INTRAVENOUS
Status: DISPENSED
Start: 2018-03-12

## (undated) RX ORDER — DEXAMETHASONE SODIUM PHOSPHATE 4 MG/ML
INJECTION, SOLUTION INTRA-ARTICULAR; INTRALESIONAL; INTRAMUSCULAR; INTRAVENOUS; SOFT TISSUE
Status: DISPENSED
Start: 2018-03-12

## (undated) RX ORDER — CIPROFLOXACIN 500 MG/1
TABLET, FILM COATED ORAL
Status: DISPENSED
Start: 2017-04-12

## (undated) RX ORDER — FENTANYL CITRATE 50 UG/ML
INJECTION, SOLUTION INTRAMUSCULAR; INTRAVENOUS
Status: DISPENSED
Start: 2018-03-12

## (undated) RX ORDER — PROPOFOL 10 MG/ML
INJECTION, EMULSION INTRAVENOUS
Status: DISPENSED
Start: 2018-03-12

## (undated) RX ORDER — ACETAMINOPHEN 325 MG/1
TABLET ORAL
Status: DISPENSED
Start: 2018-03-12

## (undated) RX ORDER — BUPIVACAINE HYDROCHLORIDE 2.5 MG/ML
INJECTION, SOLUTION EPIDURAL; INFILTRATION; INTRACAUDAL
Status: DISPENSED
Start: 2018-03-12